# Patient Record
Sex: MALE | Race: WHITE | NOT HISPANIC OR LATINO | URBAN - METROPOLITAN AREA
[De-identification: names, ages, dates, MRNs, and addresses within clinical notes are randomized per-mention and may not be internally consistent; named-entity substitution may affect disease eponyms.]

---

## 2017-04-20 ENCOUNTER — OUTPATIENT (OUTPATIENT)
Dept: OUTPATIENT SERVICES | Facility: HOSPITAL | Age: 57
LOS: 1 days | End: 2017-04-20

## 2017-04-24 ENCOUNTER — OUTPATIENT (OUTPATIENT)
Dept: OUTPATIENT SERVICES | Facility: HOSPITAL | Age: 57
LOS: 1 days | End: 2017-04-24

## 2017-05-01 ENCOUNTER — OUTPATIENT (OUTPATIENT)
Dept: OUTPATIENT SERVICES | Facility: HOSPITAL | Age: 57
LOS: 1 days | End: 2017-05-01

## 2017-05-01 ENCOUNTER — INPATIENT (INPATIENT)
Facility: HOSPITAL | Age: 57
LOS: 1 days | Discharge: HOME | End: 2017-05-03
Payer: COMMERCIAL

## 2017-05-01 PROCEDURE — 72170 X-RAY EXAM OF PELVIS: CPT | Mod: 26

## 2017-05-02 ENCOUNTER — OUTPATIENT (OUTPATIENT)
Dept: OUTPATIENT SERVICES | Facility: HOSPITAL | Age: 57
LOS: 1 days | End: 2017-05-02

## 2017-05-03 ENCOUNTER — OUTPATIENT (OUTPATIENT)
Dept: OUTPATIENT SERVICES | Facility: HOSPITAL | Age: 57
LOS: 1 days | End: 2017-05-03

## 2018-05-14 ENCOUNTER — TRANSCRIPTION ENCOUNTER (OUTPATIENT)
Age: 58
End: 2018-05-14

## 2018-07-25 ENCOUNTER — TRANSCRIPTION ENCOUNTER (OUTPATIENT)
Age: 58
End: 2018-07-25

## 2022-06-05 ENCOUNTER — TRANSCRIPTION ENCOUNTER (OUTPATIENT)
Age: 62
End: 2022-06-05

## 2022-06-05 ENCOUNTER — INPATIENT (INPATIENT)
Facility: HOSPITAL | Age: 62
LOS: 9 days | Discharge: HOME | DRG: 29 | End: 2022-06-15
Attending: STUDENT IN AN ORGANIZED HEALTH CARE EDUCATION/TRAINING PROGRAM | Admitting: INTERNAL MEDICINE
Payer: MEDICARE

## 2022-06-05 VITALS
SYSTOLIC BLOOD PRESSURE: 127 MMHG | WEIGHT: 199.3 LBS | OXYGEN SATURATION: 94 % | HEIGHT: 71 IN | TEMPERATURE: 99 F | HEART RATE: 72 BPM | RESPIRATION RATE: 16 BRPM | DIASTOLIC BLOOD PRESSURE: 78 MMHG

## 2022-06-05 DIAGNOSIS — I10 ESSENTIAL (PRIMARY) HYPERTENSION: ICD-10-CM

## 2022-06-05 DIAGNOSIS — Z86.79 PERSONAL HISTORY OF OTHER DISEASES OF THE CIRCULATORY SYSTEM: ICD-10-CM

## 2022-06-05 DIAGNOSIS — G06.1 INTRASPINAL ABSCESS AND GRANULOMA: ICD-10-CM

## 2022-06-05 DIAGNOSIS — Z01.810 ENCOUNTER FOR PREPROCEDURAL CARDIOVASCULAR EXAMINATION: ICD-10-CM

## 2022-06-05 LAB
ALBUMIN SERPL ELPH-MCNC: 3.3 G/DL — SIGNIFICANT CHANGE UP (ref 3.3–5.2)
ALP SERPL-CCNC: 109 U/L — SIGNIFICANT CHANGE UP (ref 40–120)
ALT FLD-CCNC: 24 U/L — SIGNIFICANT CHANGE UP
ANION GAP SERPL CALC-SCNC: 12 MMOL/L — SIGNIFICANT CHANGE UP (ref 5–17)
AST SERPL-CCNC: 16 U/L — SIGNIFICANT CHANGE UP
BASOPHILS # BLD AUTO: 0 K/UL — SIGNIFICANT CHANGE UP (ref 0–0.2)
BASOPHILS NFR BLD AUTO: 0 % — SIGNIFICANT CHANGE UP (ref 0–2)
BILIRUB SERPL-MCNC: 0.4 MG/DL — SIGNIFICANT CHANGE UP (ref 0.4–2)
BUN SERPL-MCNC: 25.3 MG/DL — HIGH (ref 8–20)
CALCIUM SERPL-MCNC: 8.8 MG/DL — SIGNIFICANT CHANGE UP (ref 8.6–10.2)
CHLORIDE SERPL-SCNC: 93 MMOL/L — LOW (ref 98–107)
CO2 SERPL-SCNC: 23 MMOL/L — SIGNIFICANT CHANGE UP (ref 22–29)
CREAT SERPL-MCNC: 1.16 MG/DL — SIGNIFICANT CHANGE UP (ref 0.5–1.3)
CRP SERPL-MCNC: 23 MG/L — HIGH
EGFR: 71 ML/MIN/1.73M2 — SIGNIFICANT CHANGE UP
EOSINOPHIL # BLD AUTO: 0 K/UL — SIGNIFICANT CHANGE UP (ref 0–0.5)
EOSINOPHIL NFR BLD AUTO: 0 % — SIGNIFICANT CHANGE UP (ref 0–6)
ERYTHROCYTE [SEDIMENTATION RATE] IN BLOOD: 24 MM/HR — HIGH (ref 0–20)
GIANT PLATELETS BLD QL SMEAR: PRESENT — SIGNIFICANT CHANGE UP
GLUCOSE SERPL-MCNC: 270 MG/DL — HIGH (ref 70–99)
HCT VFR BLD CALC: 42.6 % — SIGNIFICANT CHANGE UP (ref 39–50)
HGB BLD-MCNC: 14.3 G/DL — SIGNIFICANT CHANGE UP (ref 13–17)
LYMPHOCYTES # BLD AUTO: 0.94 K/UL — LOW (ref 1–3.3)
LYMPHOCYTES # BLD AUTO: 5.2 % — LOW (ref 13–44)
MANUAL SMEAR VERIFICATION: SIGNIFICANT CHANGE UP
MCHC RBC-ENTMCNC: 30.4 PG — SIGNIFICANT CHANGE UP (ref 27–34)
MCHC RBC-ENTMCNC: 33.6 GM/DL — SIGNIFICANT CHANGE UP (ref 32–36)
MCV RBC AUTO: 90.4 FL — SIGNIFICANT CHANGE UP (ref 80–100)
METAMYELOCYTES # FLD: 0.9 % — HIGH (ref 0–0)
MONOCYTES # BLD AUTO: 0.47 K/UL — SIGNIFICANT CHANGE UP (ref 0–0.9)
MONOCYTES NFR BLD AUTO: 2.6 % — SIGNIFICANT CHANGE UP (ref 2–14)
MYELOCYTES NFR BLD: 3.5 % — HIGH (ref 0–0)
NEUTROPHILS # BLD AUTO: 15.83 K/UL — HIGH (ref 1.8–7.4)
NEUTROPHILS NFR BLD AUTO: 87.8 % — HIGH (ref 43–77)
PLAT MORPH BLD: NORMAL — SIGNIFICANT CHANGE UP
PLATELET # BLD AUTO: 496 K/UL — HIGH (ref 150–400)
POTASSIUM SERPL-MCNC: 4.7 MMOL/L — SIGNIFICANT CHANGE UP (ref 3.5–5.3)
POTASSIUM SERPL-SCNC: 4.7 MMOL/L — SIGNIFICANT CHANGE UP (ref 3.5–5.3)
PROCALCITONIN SERPL-MCNC: 0.12 NG/ML — HIGH (ref 0.02–0.1)
PROT SERPL-MCNC: 6.9 G/DL — SIGNIFICANT CHANGE UP (ref 6.6–8.7)
RBC # BLD: 4.71 M/UL — SIGNIFICANT CHANGE UP (ref 4.2–5.8)
RBC # FLD: 13.3 % — SIGNIFICANT CHANGE UP (ref 10.3–14.5)
RBC BLD AUTO: NORMAL — SIGNIFICANT CHANGE UP
SARS-COV-2 RNA SPEC QL NAA+PROBE: SIGNIFICANT CHANGE UP
SODIUM SERPL-SCNC: 128 MMOL/L — LOW (ref 135–145)
WBC # BLD: 18.03 K/UL — HIGH (ref 3.8–10.5)
WBC # FLD AUTO: 18.03 K/UL — HIGH (ref 3.8–10.5)

## 2022-06-05 PROCEDURE — 99223 1ST HOSP IP/OBS HIGH 75: CPT

## 2022-06-05 PROCEDURE — 72128 CT CHEST SPINE W/O DYE: CPT | Mod: 26

## 2022-06-05 PROCEDURE — 99222 1ST HOSP IP/OBS MODERATE 55: CPT

## 2022-06-05 RX ORDER — VANCOMYCIN HCL 1 G
VIAL (EA) INTRAVENOUS
Refills: 0 | Status: DISCONTINUED | OUTPATIENT
Start: 2022-06-05 | End: 2022-06-05

## 2022-06-05 RX ORDER — DILTIAZEM HCL 120 MG
1 CAPSULE, EXT RELEASE 24 HR ORAL
Qty: 0 | Refills: 0 | DISCHARGE

## 2022-06-05 RX ORDER — CEFAZOLIN SODIUM 1 G
2000 VIAL (EA) INJECTION EVERY 8 HOURS
Refills: 0 | Status: DISCONTINUED | OUTPATIENT
Start: 2022-06-05 | End: 2022-06-06

## 2022-06-05 RX ORDER — METOPROLOL TARTRATE 50 MG
75 TABLET ORAL
Refills: 0 | Status: DISCONTINUED | OUTPATIENT
Start: 2022-06-05 | End: 2022-06-06

## 2022-06-05 RX ORDER — VANCOMYCIN HCL 1 G
1000 VIAL (EA) INTRAVENOUS ONCE
Refills: 0 | Status: COMPLETED | OUTPATIENT
Start: 2022-06-05 | End: 2022-06-05

## 2022-06-05 RX ORDER — LANOLIN ALCOHOL/MO/W.PET/CERES
3 CREAM (GRAM) TOPICAL AT BEDTIME
Refills: 0 | Status: DISCONTINUED | OUTPATIENT
Start: 2022-06-05 | End: 2022-06-06

## 2022-06-05 RX ORDER — SODIUM CHLORIDE 9 MG/ML
1000 INJECTION, SOLUTION INTRAVENOUS
Refills: 0 | Status: DISCONTINUED | OUTPATIENT
Start: 2022-06-05 | End: 2022-06-05

## 2022-06-05 RX ORDER — ALPRAZOLAM 0.25 MG
0.5 TABLET ORAL
Qty: 0 | Refills: 0 | DISCHARGE

## 2022-06-05 RX ORDER — OXYCODONE HYDROCHLORIDE 5 MG/1
5 TABLET ORAL EVERY 4 HOURS
Refills: 0 | Status: DISCONTINUED | OUTPATIENT
Start: 2022-06-05 | End: 2022-06-06

## 2022-06-05 RX ORDER — PANTOPRAZOLE SODIUM 20 MG/1
40 TABLET, DELAYED RELEASE ORAL
Refills: 0 | Status: DISCONTINUED | OUTPATIENT
Start: 2022-06-05 | End: 2022-06-06

## 2022-06-05 RX ORDER — CEFAZOLIN SODIUM 1 G
2000 VIAL (EA) INJECTION ONCE
Refills: 0 | Status: COMPLETED | OUTPATIENT
Start: 2022-06-05 | End: 2022-06-05

## 2022-06-05 RX ORDER — ONDANSETRON 8 MG/1
4 TABLET, FILM COATED ORAL EVERY 8 HOURS
Refills: 0 | Status: DISCONTINUED | OUTPATIENT
Start: 2022-06-05 | End: 2022-06-06

## 2022-06-05 RX ORDER — OMEPRAZOLE 10 MG/1
1 CAPSULE, DELAYED RELEASE ORAL
Qty: 0 | Refills: 0 | DISCHARGE

## 2022-06-05 RX ORDER — ATORVASTATIN CALCIUM 80 MG/1
1 TABLET, FILM COATED ORAL
Qty: 0 | Refills: 0 | DISCHARGE

## 2022-06-05 RX ORDER — METOPROLOL TARTRATE 50 MG
1 TABLET ORAL
Qty: 0 | Refills: 0 | DISCHARGE

## 2022-06-05 RX ORDER — CEFEPIME 1 G/1
2000 INJECTION, POWDER, FOR SOLUTION INTRAMUSCULAR; INTRAVENOUS EVERY 12 HOURS
Refills: 0 | Status: DISCONTINUED | OUTPATIENT
Start: 2022-06-05 | End: 2022-06-05

## 2022-06-05 RX ORDER — ATORVASTATIN CALCIUM 80 MG/1
10 TABLET, FILM COATED ORAL AT BEDTIME
Refills: 0 | Status: DISCONTINUED | OUTPATIENT
Start: 2022-06-05 | End: 2022-06-06

## 2022-06-05 RX ORDER — SENNA PLUS 8.6 MG/1
2 TABLET ORAL AT BEDTIME
Refills: 0 | Status: DISCONTINUED | OUTPATIENT
Start: 2022-06-05 | End: 2022-06-06

## 2022-06-05 RX ORDER — SODIUM CHLORIDE 9 MG/ML
1000 INJECTION INTRAMUSCULAR; INTRAVENOUS; SUBCUTANEOUS
Refills: 0 | Status: DISCONTINUED | OUTPATIENT
Start: 2022-06-05 | End: 2022-06-06

## 2022-06-05 RX ORDER — DILTIAZEM HCL 120 MG
240 CAPSULE, EXT RELEASE 24 HR ORAL DAILY
Refills: 0 | Status: DISCONTINUED | OUTPATIENT
Start: 2022-06-05 | End: 2022-06-06

## 2022-06-05 RX ORDER — POLYETHYLENE GLYCOL 3350 17 G/17G
17 POWDER, FOR SOLUTION ORAL DAILY
Refills: 0 | Status: DISCONTINUED | OUTPATIENT
Start: 2022-06-05 | End: 2022-06-06

## 2022-06-05 RX ORDER — CEFAZOLIN SODIUM 1 G
VIAL (EA) INJECTION
Refills: 0 | Status: DISCONTINUED | OUTPATIENT
Start: 2022-06-05 | End: 2022-06-06

## 2022-06-05 RX ORDER — ENOXAPARIN SODIUM 100 MG/ML
40 INJECTION SUBCUTANEOUS EVERY 24 HOURS
Refills: 0 | Status: DISCONTINUED | OUTPATIENT
Start: 2022-06-05 | End: 2022-06-05

## 2022-06-05 RX ORDER — ACETAMINOPHEN 500 MG
650 TABLET ORAL EVERY 6 HOURS
Refills: 0 | Status: DISCONTINUED | OUTPATIENT
Start: 2022-06-05 | End: 2022-06-06

## 2022-06-05 RX ORDER — OXYCODONE HYDROCHLORIDE 5 MG/1
10 TABLET ORAL EVERY 4 HOURS
Refills: 0 | Status: DISCONTINUED | OUTPATIENT
Start: 2022-06-05 | End: 2022-06-06

## 2022-06-05 RX ORDER — VANCOMYCIN HCL 1 G
1000 VIAL (EA) INTRAVENOUS EVERY 12 HOURS
Refills: 0 | Status: DISCONTINUED | OUTPATIENT
Start: 2022-06-05 | End: 2022-06-05

## 2022-06-05 RX ORDER — LOSARTAN POTASSIUM 100 MG/1
25 TABLET, FILM COATED ORAL DAILY
Refills: 0 | Status: DISCONTINUED | OUTPATIENT
Start: 2022-06-05 | End: 2022-06-06

## 2022-06-05 RX ADMIN — CEFEPIME 100 MILLIGRAM(S): 1 INJECTION, POWDER, FOR SOLUTION INTRAMUSCULAR; INTRAVENOUS at 05:51

## 2022-06-05 RX ADMIN — Medication 250 MILLIGRAM(S): at 03:38

## 2022-06-05 RX ADMIN — Medication 240 MILLIGRAM(S): at 05:52

## 2022-06-05 RX ADMIN — SODIUM CHLORIDE 100 MILLILITER(S): 9 INJECTION INTRAMUSCULAR; INTRAVENOUS; SUBCUTANEOUS at 12:48

## 2022-06-05 RX ADMIN — Medication 100 MILLIGRAM(S): at 12:46

## 2022-06-05 RX ADMIN — LOSARTAN POTASSIUM 25 MILLIGRAM(S): 100 TABLET, FILM COATED ORAL at 05:52

## 2022-06-05 RX ADMIN — SODIUM CHLORIDE 100 MILLILITER(S): 9 INJECTION INTRAMUSCULAR; INTRAVENOUS; SUBCUTANEOUS at 21:06

## 2022-06-05 RX ADMIN — Medication 75 MILLIGRAM(S): at 18:26

## 2022-06-05 RX ADMIN — PANTOPRAZOLE SODIUM 40 MILLIGRAM(S): 20 TABLET, DELAYED RELEASE ORAL at 05:51

## 2022-06-05 RX ADMIN — SODIUM CHLORIDE 125 MILLILITER(S): 9 INJECTION, SOLUTION INTRAVENOUS at 03:54

## 2022-06-05 RX ADMIN — Medication 100 MILLIGRAM(S): at 21:05

## 2022-06-05 RX ADMIN — Medication 75 MILLIGRAM(S): at 05:52

## 2022-06-05 RX ADMIN — ATORVASTATIN CALCIUM 10 MILLIGRAM(S): 80 TABLET, FILM COATED ORAL at 21:06

## 2022-06-05 NOTE — H&P ADULT - ASSESSMENT
ASSESSMENT:  61M with PMHX HTN, DVT/PE, Testicular CA s/p chemo/radiation/LN Dissection c/o SOB/RICKETTS and cough admitted to [facility] for Acute Hypoxic Respiratory Failure 2/2 COVID PNA with hospital course c/b multiple electrolyte abnormalities and MSSA Bacteremia subsequently found to have Diskitis/Osteomyelitis and Epidural Abscess for which he was transferred to [facility] for IR and Neurosurgical evaluation/intervention.    PLAN:  Diskitis/Osteomyelitis/Epidural Abscess r/o Cord Compression/Cauda Equina  -Admit to Medicine/SDU  -Neurochecks q2  -Vanco 1g IV q12  -Cefepime 2g IV q12  -Repeat BCX x2  -Check Labs  -CRP/ESR/Procal  -MRI reviewed  -IVF LR 125cc/hr  -VTE PPX SCD for now in case of intervention  -NPO  -IR Consulted Case discussed with Dr Ta from IR who reviewed images - no role for IR intervention at this time recommends neurosurgical evaluation of diskitis/OM/epidural abscess   -ID Consulted  -Neurosurgery Consulted - Dr Vicente aware    Acute Hypoxic Resp Failure 2/2 COVID PNA  -Vacccinated x2  -CTA Chest negative for PE  -Completed Remdesivir at [facility]  -Discontinue further Dexamethasone 6mg IV q24 in setting of MSSA Bacteremia, Epidural Abscess, and Diskitis/Osteomyelitis unless neuro exam becomes compromised on repeat neurocheck  -Hypoxia improving satting 94% RA    MSSA Bacteremia  -Repeat Cultures at [facility] negative. Ancef changed to Cefepime. TTE negative for veg at [facility]. Cont Cefepime for now. Add Vanco. Repeat BCX. ID consult. See plan above.     Hypokalemia, Hyponatremia  -Resolved then c/b Hyperkalemia. IVF LR 125cc/hr.   -Hold HCTZ. Losartan 25mg PO q24 added at [facility]  -Repeat BMP/Check Electrolytes    HTN, HLD  -Cardizem 240mg PO q24  -Metoprolol Tartrate 75mg PO BID  -Hold HCTZ  -Losartan 25mg PO q24  -Check BP    Testicular CA s/p chemo/radiation/LN Dissection  -PETSCAN 6-7 mos ago negative for malignancy  -CT ABD findings likely known Lymphocele / postsurgical changes from prior LN dissection  -Outpatient F/u [facility]     Constipation  -Miralax 17g PO q24 + Senna 2 tab qHS while on Oxycodone PRN

## 2022-06-05 NOTE — CONSULT NOTE ADULT - PROBLEM SELECTOR RECOMMENDATION 2
.  -= remote history 20+ years ago after taking afrin and primatine mist   - DCCV  after second occurance 20+ years ago  - not on AC  - SR tele, no alarms

## 2022-06-05 NOTE — CONSULT NOTE ADULT - PROBLEM SELECTOR RECOMMENDATION 9
.  - Pre-operative cardiovascular risk evaluation and management.   -No cardiac symptoms.   -Non-ischemic ECG.   -TTE normal EF, no RWMA  -Ischemic w/u- as per pt nst last year-normal.   -METs > 4.   - RCRI (revised cardiac risk index score) < 1%. No further cardiac work up is needed.   Further cardiac work up will not change risk of the patient. Proceed for surgery as indicated.  - No active chest pain, evidence of ischemia, decompensated CHF, significant valvular abnormality, or unstable arrhythmia and therefore no absolute cardiac contraindication to the planned surgical procedure.

## 2022-06-05 NOTE — CONSULT NOTE ADULT - SUBJECTIVE AND OBJECTIVE BOX
HPI:  61M with PMHX HTN, DVT/PE, Testicular CA s/p chemo/radiation/LN Dissection c/o SOB/RICKETTS and cough admitted to [facility] for Acute Hypoxic Respiratory Failure 2/2 COVID PNA. Treated with steroids and remdesivir. Found to have MSSA bacteremia treated with Ancef. Subsequently c/o low back pain had MRI WO which noted diskitis/osteomyelitis and epidural abscess. Patient evaluated by Neurosurgery at [place] who recommended IR intervention and transfer to [facility]. Neurosurgery consulted. Pt seen/examined. Denies current complaints. Doing well. ROS negative. Neuro exam intact. Discussed with RN.     61M transfer from [place] where he presented with SOB and cough, found to have covid - treated w Remdesivir/steroids, blood cultures + MSSA, on Ancef, c/o back pain - imaging shows T6-10 phlegmon and epidural abcess T7-8.  Pt denies radiating pain, tingling numbness and weakness, bowel bladder changes.  Has been walking without difficulty.  Admits to peripheral neuropathy that has been present since finishing chemo therapy 5 yrs ago - tingling in both hands and loss of sensation both feet.      PMHX: HTN, DVT/PE, Testicular CA s/p chemo/radiation/LN Dissection  PSHX: Radical Orchiectomy, Hip Replacement, Hernia Repair  FamHx: +Mother CA +Mother DM2, +Father CAD  Social Hx: Former Smoker quit 20 years ago  NKDA (05 Jun 2022 02:52)      SOCIAL HISTORY:  Tobacco Use: quit 20yrs ago  EtOH use:   Substance:    REVIEW OF SYSTEMS  RESPIRATORY: HPI   CARDIOVASCULAR: No chest pain, palpitations  GASTROINTESTINAL: No abdominal or epigastric pain bowel changes  GENITOURINARY: No dysuria  NEUROLOGICAL: Baseline neuropathy hands and feet    HOME MEDICATIONS:  Home Medications:  ALPRAZolam 0.25 mg oral tablet: 0.5 tab(s) orally once a day, As Needed (05 Jun 2022 02:21)  atorvastatin 10 mg oral tablet: 1 tab(s) orally once a day (05 Jun 2022 02:20)  Cartia  mg/24 hours oral capsule, extended release: 1 cap(s) orally once a day (05 Jun 2022 02:20)  hydroCHLOROthiazide 12.5 mg oral capsule: 1 cap(s) orally once a day (05 Jun 2022 02:20)  Metoprolol Tartrate 75 mg oral tablet: 1 tab(s) orally 2 times a day (05 Jun 2022 02:20)  omeprazole 40 mg oral delayed release capsule: 1 cap(s) orally once a day (05 Jun 2022 02:20)  Percocet 5/325 oral tablet: 1 tab(s) orally every 6 hours, As Needed (05 Jun 2022 02:20)      MEDICATIONS:  Antibiotics:  cefepime   IVPB 2000 milliGRAM(s) IV Intermittent every 12 hours  vancomycin  IVPB 1000 milliGRAM(s) IV Intermittent once  vancomycin  IVPB 1000 milliGRAM(s) IV Intermittent every 12 hours  vancomycin  IVPB        Neuro:  acetaminophen     Tablet .. 650 milliGRAM(s) Oral every 6 hours PRN  melatonin 3 milliGRAM(s) Oral at bedtime PRN  ondansetron Injectable 4 milliGRAM(s) IV Push every 8 hours PRN  oxyCODONE    IR 5 milliGRAM(s) Oral every 4 hours PRN  oxyCODONE    IR 10 milliGRAM(s) Oral every 4 hours PRN    Anticoagulation:    OTHER:  aluminum hydroxide/magnesium hydroxide/simethicone Suspension 30 milliLiter(s) Oral every 4 hours PRN  atorvastatin 10 milliGRAM(s) Oral at bedtime  diltiazem    milliGRAM(s) Oral daily  losartan 25 milliGRAM(s) Oral daily  metoprolol tartrate 75 milliGRAM(s) Oral two times a day  pantoprazole    Tablet 40 milliGRAM(s) Oral before breakfast  polyethylene glycol 3350 17 Gram(s) Oral daily  senna 2 Tablet(s) Oral at bedtime    IVF:  lactated ringers. 1000 milliLiter(s) IV Continuous <Continuous>    LABS:  CRP 23  ESR 24  D-Dimers 490                          14.3   18.03 )-----------( 496      ( 05 Jun 2022 02:05 )             42.6     06-05    128<L>  |  93<L>  |  25.3<H>  ----------------------------<  270<H>  4.7   |  23.0  |  1.16    Ca    8.8      05 Jun 2022 02:05    TPro  6.9  /  Alb  3.3  /  TBili  0.4  /  DBili  x   /  AST  16  /  ALT  24  /  AlkPhos  109  06-05    CULTURES:  Blood Culture 5/29 + MSSA      RADIOLOGY & ADDITIONAL STUDIES:  MRI T/spine - phlegmon T6-10, abcess T7-8  MRI L/spine - cyst anterior to L34 vertebral bodies 3x4x5 cm    Vital Signs Last 24 Hrs  T(C): 37.1 (05 Jun 2022 00:35), Max: 37.1 (05 Jun 2022 00:35)  T(F): 98.8 (05 Jun 2022 00:35), Max: 98.8 (05 Jun 2022 00:35)  HR: 72 (05 Jun 2022 00:35) (72 - 72)  BP: 127/78 (05 Jun 2022 00:35) (127/78 - 127/78)  BP(mean): 100 (05 Jun 2022 00:35) (100 - 100)  RR: 16 (05 Jun 2022 00:35) (16 - 16)  SpO2: 94% (05 Jun 2022 00:35) (94% - 94%)      PHYSICAL EXAM:  GENERAL: NAD, well-groomed  SILT nipples to toes b/l  M 5/5 IP Quad TA EHL Gastroc & Peroneals b/l  SILT L3-S1  DTR 2+ knees absent AJ  Babinski non reactive b/l      
                                           [facility]                                                INFECTIOUS DISEASES  =======================================================                               Chance Boggs MD#  Luis Manuel Anand MD*                                     Brielle De La Rosa MD*    Dania Villarreal MD*            Diplomates American Board of Internal Medicine & Infectious Diseases                  # [place] Office - Appt - Tel  [phone] Fax [phone]                * [place] Office - Appt - Tel [phone] Fax [phone]                                  Hospital Consult line:  [phone]  =======================================================      MRN-[id number]  DIANA GUTIERREZ   HPI: This 61M with HTN, DVT/PE, Testicular CA s/p chemo/radiation/LN Dissection c/o SOB/RICKETTS and cough admitted to [facility] for Acute Hypoxic Respiratory Failure 2/2 COVID PNA. Treated with steroids and remdesivir. Found to have MSSA bacteremia treated with Ancef. Subsequently c/o low back pain had MRI WO which noted diskitis/osteomyelitis and epidural abscess. Patient evaluated by Neurosurgery at [place] who recommended IR intervention and transfer to [facility]. Neurosurgery consulted. Pt seen/examined. Denies current complaints. Doing well. ROS negative. Neuro exam intact. Discussed with RN.     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 he went to see a spine Dr. In [place] Who dx him with arthritis of the back and treated with pain meds.   about 2 weeks ago, he developed a boil in his RIGHT groin. He denies squeezing at it, but stated that it went away as quick as it came.   Denies IVDU.   no other skin conditions to report.     MRI and CT scan from [facility] reviewed.     I have personally reviewed the labs and data; pertinent labs and data are listed in this note; please see below.   =======================================================  Past Medical & Surgical Hx:  =====================  PAST MEDICAL & SURGICAL HISTORY:    Problem List:  ==========  HEALTH ISSUES - PROBLEM Dx:  Preop cardiovascular exam  History of atrial fibrillation  HTN (hypertension)      Social Hx:  =======  no toxic habits currently    FAMILY HISTORY:  no significant family history of immunosuppressive disorders in mother or father   =======================================================    REVIEW OF SYSTEMS:  CONSTITUTIONAL:  No Fever or chills  HEENT:  No diplopia or blurred vision.  No earache, sore throat or runny nose.  CARDIOVASCULAR:  No pressure, squeezing, strangling, tightness, heaviness or aching about the chest, neck, axilla or epigastrium.  RESPIRATORY:  No cough, shortness of breath  GASTROINTESTINAL:  No nausea, vomiting or diarrhea.  GENITOURINARY:  No dysuria, frequency or urgency. No Blood in urine  MUSCULOSKELETAL:  BACK PAIN  SKIN:  No change in skin, hair or nails.  NEUROLOGIC:  No Headaches, seizures or weakness.  PSYCHIATRIC:  No disorder of thought or mood.  ENDOCRINE:  No heat or cold intolerance  HEMATOLOGICAL:  No easy bruising or bleeding.    =======================================================  Allergies  No Known Allergies    Antibiotics:  ceFAZolin   IVPB 2000 milliGRAM(s) IV Intermittent every 8 hours    Other medications:  atorvastatin 10 milliGRAM(s) Oral at bedtime  diltiazem    milliGRAM(s) Oral daily  losartan 25 milliGRAM(s) Oral daily  metoprolol tartrate 75 milliGRAM(s) Oral two times a day  pantoprazole    Tablet 40 milliGRAM(s) Oral before breakfast  polyethylene glycol 3350 17 Gram(s) Oral daily  senna 2 Tablet(s) Oral at bedtime  sodium chloride 0.9%. 1000 milliLiter(s) IV Continuous <Continuous>     ceFAZolin   IVPB   100 mL/Hr IV Intermittent (22 @ 12:46)    cefepime   IVPB   100 mL/Hr IV Intermittent (22 @ 05:51)    vancomycin  IVPB   250 mL/Hr IV Intermittent (22 @ 03:38)      ======================================================  Physical Exam:  ============  T(F): 98 (2022 12:00), Max: 98.8 (2022 00:35)  HR: 71 (2022 12:00)  BP: 128/89 (2022 12:00)  RR: 16 (2022 12:00)  SpO2: 100% (2022 12:00) (94% - 100%)  temp max in last 48H T(F): , Max: 98.8 (22 @ 00:35)Height (cm): 180.3 (22 @ 00:35)  Weight (kg): 90.4 (22 @ 00:35)  BMI (kg/m2): 27.8 (22 @ 00:35)  BSA (m2): 2.11 (22 @ 00:35)    General:  No acute distress.  Eye: Pupils are equal, round and reactive to light, Extraocular movements are intact, Normal conjunctiva.  HENT: Normocephalic, Oral mucosa is moist, No pharyngeal erythema, No sinus tenderness.  Neck: Supple, No lymphadenopathy.  Respiratory: Lungs are clear to auscultation, Respirations are non-labored.  Cardiovascular: Normal rate, Regular rhythm,   Gastrointestinal: Soft, Non-tender, Non-distended, Normal bowel sounds.  Genitourinary: No costovertebral angle tenderness.  Lymphatics: No lymphadenopathy neck,   Musculoskeletal: MID thoracic spine with tenderness to palpation  Integumentary: No rash.  Neurologic: Alert, Oriented, No focal deficits, Cranial Nerves II-XII are grossly intact.  Psychiatric: Appropriate mood & affect.    =======================================================  Labs:                        14.3   18.03 )-----------( 496      ( 2022 02:05 )             42.6     -    128<L>  |  93<L>  |  25.3<H>  ----------------------------<  270<H>  4.7   |  23.0  |  1.16    Ca    8.8      2022 02:05    TPro  6.9  /  Alb  3.3  /  TBili  0.4  /  DBili  x   /  AST  16  /  ALT  24  /  AlkPhos  109  -      Creatinine, Serum: 1.16 mg/dL (22 @ 02:05)    C-Reactive Protein, Serum: 23 mg/L (22 @ 02:05)    Sedimentation Rate, Erythrocyte: 24 mm/hr (22 @ 02:05)    Procalcitonin, Serum: 0.12 ng/mL (22 @ 02:05)    ====================       AT [place]  COVID+ on 22  COVID negative on 22  ==========    ACC: [id number] EXAM:  CT THORACIC SPINE                          PROCEDURE DATE:  2022          INTERPRETATION:  Clinical indication: MSSA bacteremia with thoracic   epidural abscess, preop    Serial thin sections on a multi slice scanner were obtained through the   thoracic spine from the T1-2 to the L2-3 level in a stacked axial fashion   reformatted at 1.25 mm sections with sagittal and coronal computer   generated reconstructed views.      Comparison is made with the prior MRI of the thoracic and lumbar spine of   2022.    The thoracic vertebral bodies are normal in height and density. There is   irregularity at the T7-8 disc space which is narrow with mild erosion of   the endplates of T7 and T8 at the T7-8 level, at the level of the   enhancing vertebral bodies and the ventral epidural extension seen on   MRI. Mild erosion of the endplates of T8 and T9 are also identified. A   small amount of anterior and bilateral paraspinal soft tissue is   identified at T7-8 and T8-9 consistent with inflammatory changes.    Vacuum disc phenomenon is identified at T10-11. At L1-2 mild   retrolisthesis of L1 on L2 with disc space narrowing and vacuum   phenomenon. Endplate sclerosis is identified as well as several areas of   endplate erosion. On the MRI of 2022 there is also enhancement of the   disc space at the endplates with mild ventral epidural enhancement at L1.   A small amount of anterior paraspinal soft tissue prominence is   identified at this level as well as surgical clips.    The The circumferential enhancing epidural collection in the thoracic   spine seen on the prior MRI is not appreciated on this noncontrast study.      IMPRESSION: Endplate erosion T7-8, T8-9 and L1-2 consistent with   osteomyelitis and discitis as on the MRI of the thoracic and lumbar spine   of 2022. Mild anterior and paraspinal soft tissue prominence at these   levels. The ventral epidural extension and the circumferential epidural   collection seen on the MRI is not appreciated.    --- End of Report ---       GALA PEREZ MD; Attending Radiologist  This document has been electronically signed. 2022  2:23PM    ================      ***Final Report***      PROCEDURE:   MRI 1008 _ MRI T SPINE W/O,W/ [id number]  DATE:  2022  ORDER NO:  [id number]    MR LUMBAR SPINE WITHOUT AND WITH IV CONTRAST, MR THORACIC SPINE WITHOUT AND  WITH IV CONTRAST    HISTORY: pain, MSSA bacteremia. Mid back pain.    TECHNIQUE:  Multiplanar MRI of the thoracic and lumbar spine was performed  with and without contrast using 6 sequences.    COMPARISON:  CT abdomen and pelvis 2022, chest CT 2022.    FINDINGS:    OSSEOUS STRUCTURES    Thoracic:  No evidence of acute fracture.    The alignment of the thoracic spine is within normal limits.    There is edema and enhancement of T7 and T8 with adjacent left-sided  paraspinal soft tissue swelling and enhancement. Increased T2 signal is seen  within the T7-T8 disc space and findings are compatible with  discitis/osteomyelitis. Circumferential enhancement is seen within the  spinal canal extending from the T6 level through the T9-T10 interspace  compatible with epidural phlegmon. Interspersed nonenhancing collections are  seen throughout this area compatible with an associated multiloculated  epidural abscess seen most notably at the T7-T8 interspace on the left, the  T8 level on the right and the T9 level posteriorly. There is constriction of  the thecal sac and moderate spinal cord compression most prominent at the  T7-T9 levels.    Small disc bulges noted at the level of C7-T1 through T2-T3. No significant  neural foraminal narrowing. No significant facet arthrosis.    Lumbar:    There is retrolisthesis of L1 on L2, retrolisthesis of L3 on L4.    There is disc space narrowing at the level of L1-L2 with subadjacent  increased fluid signal and enhancement of the vertebral bodies. There is  notable focal enhancing lesion within the anterior disc space measuring  approximately 12 x 8 mm demonstrating intermediate T2 signal. While this may  be degenerative, findings are concerning for discitis with subadjacent  phlegmon. Soft tissue thickening and enhancement is seen behind the L1  vertebral body which may represent prominent venous plexus or early phlegmon.    Disc space narrowing at the level of L2-L3. Schmorl's nodes at the superior  and inferior aspect of L3.    The spinal cord terminates at the level of L1-L2.    L1-L2: Mild disc bulge. Focal lesion as described above. No central canal  stenosis. No neural foraminal narrowing. No facet arthropathy.    L2-L3: There is no disc bulge. No central canal stenosis. No neural  foraminal narrowing. No facet arthropathy.    L3-L4: Mild disc bulge. No central canal stenosis. No neural foraminal  narrowing. No facet arthropathy.    L4-L5: Mild disc bulge with mild thickening of the ligamentum flavum. Mild  central canal stenosis. Mild bilateral neural foraminal narrowing. Mild  bilateral facet arthropathy.    L5-S1: Mild disc bulge with mild thickening of the ligamentum flavum. No  central canal stenosis. No neural foraminal narrowing. No facet arthropathy.      SOFT TISSUES  There is a 4.1 x 3.3 x 5.3 cm cyst anterior to the lumbar spine at the level  of L3-L4 to the left of the aorta, unchanged from prior. Multiple bilateral  renal cysts.    IMPRESSION:  T7-8 discitis/osteomyelitis with phlegmon/abscess extending from the T6-T9 levels causing moderate central canal stenosis and moderate cord compression.    Findings concerning for L1-L2 discitis/osteomyelitis with associated  phlegmon as discussed above.    4.1 x 3.3 x 5.3 cm periaortic cystic mass. See discussion in prior abdomen  CT report.    These findings were discussed with, and acknowledged by, Dr. Yusra George at  2022 6:41 PM  by Dr. Jennifer Pena.    --- End of Report ---           Interpreting Physician:    Radiologist PARRIS HUYNH, Resident JENNIFER PENA  Signed Electronically by / Date: PARRIS HUYNH2022  6:45PM          ===============  [facility]                Patient:  DIANA GUTIERREZ    [address] [place] [address]                           MRN:[id number]    [place] [address]                        :1960   Sex: Male    Director:    Minor Farrell M.D.               FIN:[id number]  Ordering Physician:  Betty Sutherland             Location:  2SW; 0260; 01      Blood Cultures      PROCEDURE:                Blood Culture  [i1]      COLLECTED:                2022 06:30 EDT  SOURCE:                   Blood                    RECEIVED:                 2022 11:33 EDT  BODY SITE:                Arm R                    ACCESSION:                3--0016  FREE TEXT SOURCE:         [id number] ARM           ORDERING PHYSICIAN:       Betty Sutherland    ***FINAL REPORT***  Final Report  []                                              Reported Date/Time: 2022 09:14 EDT  Staphylococcus aureus ( From Aerobic    ***PRELIMINARY REPORT***  Preliminary Report  []                                        Reported Date/Time: 2022 12:27 EDT  Presumptive Staphylococcus aureus (From Anaerobic Bottle) , identification and susceptibility to  follow.    Preliminary Report  []                                        Reported Date/Time: 2022 03:43 EDT  Culture in progress    ***STAINS / PREPARATIONS***  GS  []                                                        Reported Date/Time: 2022 09:08 EDT  Gram positive cocci in clusters (From Anaerobic Bottle)    Results called to Suellen Londono RN of 2SW. 2022 03:45:18, by AN. read back  Gram positive cocci in clusters (From Aerobic Bottle)  Result Previously Reported.  2022 09:08:44    ***SUSCEPTIBILITY RESULTS***  Staphylococcus aureus  Antibiotic                    JOVANI Dilutn              JOVANI Interp  Ciprofloxacin                 <=0.5                   Susceptible  Clindamycin                   0.25                    Resistant  Daptomycin                    0.5                     Susceptible  Erythromycin                  1                       Resistant  Gentamicin                    <=0.5                   Susceptible  Inducible Clindamycin Test    Positive                Positive  Levofloxacin                  <=0.12                  Susceptible  Linezolid                     2                       Susceptible  Oxacillin                     0.5                     Susceptible  Tetracycline                  <=1                     Susceptible  Trimethoprim/Sulfa            <=10                    Susceptible  Vancomycin                    1                       Susceptible    Interpretive Data  i1:   Blood Culture  Test performed at: [facility]  [address], [place] [address]         [phone]  
                                             [facility]                                              CARDIOLOGY AT [facility]                                                   [address], [place] [address]                                             Telephone: [phone]. Fax:[phone]                                                       CARDIOLOGY CONSULTATION NOTE                                                                                             History obtained by: Patient and medical record  Community Cardiologist: Can not recall- cory erwin  Reason for Consultation: Pre op  Available out pt records reviewed: Yes [  ] No [x  ]    Chief complaint:    Patient is a 61y old  Male who presents with a chief complaint of Epidural Abscess (05 Jun 2022 03:36)      HPI:  61M with PMHX HTN, DVT/PE, Afib 20-26y/o DCCV, Testicular CA s/p chemo/radiation/LN Dissection c/o SOB/RICKETTS and cough admitted to [facility] for Acute Hypoxic Respiratory Failure 2/2 COVID PNA. Treated with steroids and remdesivir. Found to have MSSA bacteremia treated with Ancef. Subsequently c/o low back pain had MRI WO which noted diskitis/osteomyelitis and epidural abscess. Patient evaluated by Neurosurgery at [place] who recommended IR intervention and transfer to [facility]. Neurosurgery consulted. Cardiology consulted for evaluation pre op. Denies chest pain shortness of breat dyspnea with exertion. PT states stress test last year, "clean". ECHO from [place] in HIE, maggie EF. EKG without ischemia.       CARDIAC TESTING   ECHO: 5/31/22- EF 50-55%, mild AS, no RWMA    STRESS: States last year- cardiologist office, normal       PAST MEDICAL HISTORY  HTN, DVT/PE, Testicular CA s/p chemo/radiation/LN Dissection      PAST SURGICAL HISTORY   Radical Orchiectomy, Hip Replacement, Hernia Repair        SOCIAL HISTORY:  Denies smokingdrugs  ALCOHOL: 3-4 bottles wine/week          FAMILY HISTORY:  +Mother CA +Mother DM2, +Father CAD      HOME MEDICATIONS:  ALPRAZolam 0.25 mg oral tablet: 0.5 tab(s) orally once a day, As Needed (05 Jun 2022 02:21)  atorvastatin 10 mg oral tablet: 1 tab(s) orally once a day (05 Jun 2022 02:20)  Cartia  mg/24 hours oral capsule, extended release: 1 cap(s) orally once a day (05 Jun 2022 02:20)  hydroCHLOROthiazide 12.5 mg oral capsule: 1 cap(s) orally once a day (05 Jun 2022 02:20)  Metoprolol Tartrate 75 mg oral tablet: 1 tab(s) orally 2 times a day (05 Jun 2022 02:20)  omeprazole 40 mg oral delayed release capsule: 1 cap(s) orally once a day (05 Jun 2022 02:20)  Percocet 5/325 oral tablet: 1 tab(s) orally every 6 hours, As Needed (05 Jun 2022 02:20)      CURRENT CARDIAC MEDICATIONS:  diltiazem  milliGRAM(s) Oral daily  losartan 25 milliGRAM(s) Oral daily  metoprolol tartrate 75 milliGRAM(s) Oral two times a day      CURRENT OTHER MEDICATIONS:  acetaminophen     Tablet .. 650 milliGRAM(s) Oral every 6 hours PRN Temp greater or equal to 38C (100.4F), Mild Pain (1 - 3)  melatonin 3 milliGRAM(s) Oral at bedtime PRN Insomnia  ondansetron Injectable 4 milliGRAM(s) IV Push every 8 hours PRN Nausea and/or Vomiting  oxyCODONE    IR 5 milliGRAM(s) Oral every 4 hours PRN Moderate Pain (4 - 6)  oxyCODONE    IR 10 milliGRAM(s) Oral every 4 hours PRN Severe Pain (7 - 10)  aluminum hydroxide/magnesium hydroxide/simethicone Suspension 30 milliLiter(s) Oral every 4 hours PRN Dyspepsia  pantoprazole    Tablet 40 milliGRAM(s) Oral before breakfast  polyethylene glycol 3350 17 Gram(s) Oral daily  senna 2 Tablet(s) Oral at bedtime  atorvastatin 10 milliGRAM(s) Oral at bedtime  ceFAZolin   IVPB      ceFAZolin   IVPB 2000 milliGRAM(s) IV Intermittent every 8 hours  lactated ringers. 1000 milliLiter(s) (125 mL/Hr) IV Continuous <Continuous>  sodium chloride 0.9%. 1000 milliLiter(s) (100 mL/Hr) IV Continuous <Continuous>      ALLERGIES:   No Known Allergies      REVIEW OF SYMPTOMS:   CONSTITUTIONAL: No fever, no chills, no weight loss, no weight gain, no fatigue   ENMT:  No vertigo; No sinus or throat pain  NECK: No pain or stiffness  CARDIOVASCULAR: No chest pain, no dyspnea, no syncope/presyncope, no palpitations, no dizziness, no Orthopnea, no Paroxsymal nocturnal dyspnea  RESPIRATORY: no Shortness of breath, no cough, no wheezing  : No dysuria, no hematuria   GI: No dark color stool, no nausea, no diarrhea, no constipation, no abdominal pain   NEURO: No headache, no slurred speech   MUSCULOSKELETAL: No joint pain or swelling; No muscle, back, or extremity pain  PSYCH: No agitation, no anxiety.    ALL OTHER REVIEW OF SYSTEMS ARE NEGATIVE.    VITAL SIGNS:  T(C): 36.7 (06-05-22 @ 12:00), Max: 37.1 (06-05-22 @ 00:35)  T(F): 98 (06-05-22 @ 12:00), Max: 98.8 (06-05-22 @ 00:35)  HR: 71 (06-05-22 @ 12:00) (58 - 72)  BP: 128/89 (06-05-22 @ 12:00) (114/82 - 128/89)  RR: 16 (06-05-22 @ 12:00) (16 - 17)  SpO2: 100% (06-05-22 @ 12:00) (94% - 100%)    INTAKE AND OUTPUT:     06-04 @ 07:01  -  06-05 @ 07:00  --------------------------------------------------------  IN: 675 mL / OUT: 1500 mL / NET: -825 mL        PHYSICAL EXAM:  Constitutional: Comfortable . No acute distress.   HEENT: Atraumatic and normocephalic , neck is supple . no JVD. No carotid bruit.  CNS: A&Ox3. No focal deficits.   Respiratory: CTAB, unlabored   Cardiovascular: RRR normal s1 s2. No murmur. No rubs or gallop.  Gastrointestinal: Soft, non-tender. +Bowel sounds.   Extremities: 2+ Peripheral Pulses, No clubbing, cyanosis, or edema  Psychiatric: Calm . no agitation.   Skin: Warm and dry, no ulcers on extremities     LABS:                            14.3   18.03 )-----------( 496      ( 05 Jun 2022 02:05 )             42.6     06-05    128<L>  |  93<L>  |  25.3<H>  ----------------------------<  270<H>  4.7   |  23.0  |  1.16    Ca    8.8      05 Jun 2022 02:05    TPro  6.9  /  Alb  3.3  /  TBili  0.4  /  DBili  x   /  AST  16  /  ALT  24  /  AlkPhos  109  06-05                INTERPRETATION OF TELEMETRY:     ECG:   Prior ECG: Yes [  ] No [  ]    RADIOLOGY & ADDITIONAL STUDIES:    X-ray:    CT scan:   MRI:   US:

## 2022-06-05 NOTE — CONSULT NOTE ADULT - NS ATTEND AMEND GEN_ALL_CORE FT
NSGY Attg:    see above    patient seen and examined    LE 5/5    imaging reviewed    I explained the risks, benefits, and alternatives of thoracic laminectomy to the patient as below:    benefit: hopeful decompression, hopeful improvement in symptoms, hopeful prevention of progression of infection/deficit   alternative: no surgical intervention; continued conservative therapy (antibiotics, brace, surveillance imaging, pain management)  risks: bleeding, infection, CSF leak, failure of procedure, de-stabilization of the spine, recurrence of infection, need for re-operation/additional surgery, worsening pain, seizure, stroke, coma, death, DVT, PE, MI, PNA, UTI, difficulty/failure to intubate or extubate, new or worsening numbness, tingling, weakness, paralysis, sensory changes, difficulty/inability to ambulate, sexual dysfunction, incontinence    The patient verbalizes his understanding of the above.  I have answered all his questions.  He wishes to proceed with surgical intervention.
Epidural abcess. unclear source. recent UTi. recent Furuncle.  No dental work up.   check blood cultures. patietn optimized for epidural surgeryu  echo as per note in chuy was unremarkable  proceed for procedure as indicazgted. no further cardaic work up si needed.

## 2022-06-05 NOTE — CONSULT NOTE ADULT - ASSESSMENT
This 61M with HTN, DVT/PE, Testicular CA s/p chemo/radiation/LN Dissection c/o SOB/RICKETTS and cough admitted to [facility] for Acute Hypoxic Respiratory Failure 2/2 COVID PNA. Treated with steroids and remdesivir. Found to have MSSA bacteremia treated with Ancef. Subsequently c/o low back pain had MRI WO which noted diskitis/osteomyelitis and epidural abscess. Patient evaluated by Neurosurgery at [place] who recommended IR intervention and transfer to [facility]. Neurosurgery consulted. Pt seen/examined. Denies current complaints. Doing well. ROS negative. Neuro exam intact. Discussed with RN.     PMHX: HTN, DVT/PE, Testicular CA s/p chemo/radiation/LN Dissection  PSHX: Radical Orchiectomy, Hip Replacement, Hernia Repair  FamHx: +Mother CA +Mother DM2, +Father CAD  Social Hx: Former Smoker quit 20 years ago  NKDA (05 Jun 2022 02:52)    patient has been having back pain; recalls episode of severe back pain in a 4D movies experience of [place] in 10/2021. he went to see a spine Dr. In [place] Who dx him with arthritis of the back and treated with pain meds.   about 2 weeks ago, he developed a boil in his RIGHT groin. He denies squeezing at it, but stated that it went away as quick as it came.     Denies IVDU.   no other skin conditions to report.     MRI and CT scan from [facility] reviewed.   MSSA identified in blood cultures on 5/29/22 x 2 sets  but cleared on 5/31/22 x 2 sets, and 6/1/22 x 2 sets.       Impression:  WBC elevation  MSSA bacteremia  Spine Osteomyelitis  thoracic spine abscess  back pan      Plan:  - neurosurgical team consulted  - patient would benefit from abscess drainage and /or debridement.   Please send surgical cultures    - continue Cefazolin 2 grams Q 8H    WBC elevation is reactive  - will follow and trend    - follow up all outstanding cultures  - trend temperature and WBC curve  - repeat cultures from blood and all sources if febrile.    control of back pain per primary team      No contraindications from ID perspective for neurosurgical intervention.   
Imp Epidural abcess T7-8    Plan pre-op for laminectomy, medical risk assessment & clearance for surgery, ekg, echo, cxr, type & screen, covid tests, 2 units on hold for OR, bed rest, SCD's for dvt ppx    pain control  CT T/spine  MRI T2 sequence, axial and sagittal with continuous cuts from T4 pedicle to Sacrum  likely advance diet today & NPO past midnight tonight  continue abx
61M with PMHX HTN, DVT/PE, Afib 20-24y/o DCCV, Testicular CA s/p chemo/radiation/LN Dissection c/o SOB/RICKETTS and cough admitted to [facility] for Acute Hypoxic Respiratory Failure 2/2 COVID PNA. Treated with steroids and remdesivir. Found to have MSSA bacteremia treated with Ancef. Subsequently c/o low back pain had MRI WO which noted diskitis/osteomyelitis and epidural abscess. Patient evaluated by Neurosurgery at [place] who recommended IR intervention and transfer to [facility]. Neurosurgery consulted. Cardiology consulted for evaluation pre op. Denies chest pain shortness of breat dyspnea with exertion. PT states stress test last year, "clean". ECHO from [facility] in [place], maggie EF. EKG without ischemia.

## 2022-06-05 NOTE — H&P ADULT - HISTORY OF PRESENT ILLNESS
61M with PMHX HTN, DVT/PE, Testicular CA s/p chemo/radiation/LN Dissection c/o SOB/RICKETTS and cough admitted to [facility] for Acute Hypoxic Respiratory Failure 2/2 COVID PNA. Treated with steroids and remdesivir. Found to have MSSA bacteremia treated with Ancef. Subsequently c/o low back pain had MRI WO which noted diskitis/osteomyelitis and epidural abscess. Patient evaluated by Neurosurgery at [place] who recommended IR intervention and transfer to [facility]. Neurosurgery consulted. Pt seen/examined. Denies current complaints. Doing well. ROS negative. Neuro exam intact. Discussed with RN.     PMHX: HTN, DVT/PE, Testicular CA s/p chemo/radiation/LN Dissection  PSHX: Radical Orchiectomy, Hip Replacement, Hernia Repair  FamHx: +Mother CA +Mother DM2, +Father CAD  Social Hx: Former Smoker quit 20 years ago  NKDA

## 2022-06-05 NOTE — PATIENT PROFILE ADULT - FALL HARM RISK - HARM RISK INTERVENTIONS
Communicate Risk of Fall with Harm to all staff/Monitor for mental status changes/Reinforce activity limits and safety measures with patient and family/Tailored Fall Risk Interventions/Visual Cue: Yellow wristband and red socks/Bed in lowest position, wheels locked, appropriate side rails in place/Call bell, personal items and telephone in reach/Instruct patient to call for assistance before getting out of bed or chair/Non-slip footwear when patient is out of bed/[place] to call system/Physically safe environment - no spills, clutter or unnecessary equipment/Purposeful Proactive Rounding/Room/bathroom lighting operational, light cord in reach

## 2022-06-05 NOTE — H&P ADULT - NSHPPHYSICALEXAM_GEN_ALL_CORE
Vital Signs Last 24 Hrs  T(C): 37.1 (05 Jun 2022 00:35), Max: 37.1 (05 Jun 2022 00:35)  T(F): 98.8 (05 Jun 2022 00:35), Max: 98.8 (05 Jun 2022 00:35)  HR: 72 (05 Jun 2022 00:35) (72 - 72)  BP: 127/78 (05 Jun 2022 00:35) (127/78 - 127/78)  BP(mean): 100 (05 Jun 2022 00:35) (100 - 100)  RR: 16 (05 Jun 2022 00:35) (16 - 16)  SpO2: 94% (05 Jun 2022 00:35) (94% - 94%)    Constitutional: Well-appearing, NAD, VSS  Head: NC/AT  Eyes: PERRL, EOMI, anicteric sclera, conjunctiva WNL  ENT: Normal Pharynx, MMM, No tonsillar exudate/erythema  Neck: Supple, Non-tender  Chest: Non-tender, no rashes  Cardio: RRR, s1/s2, no appreciable murmurs/rubs/gallops  Resp: BS CTA bilaterally, no wheezing/rhonchi/rales  Abd: Soft, Non-tender, Non-distended, no rebound/guarding/rigidity  : not examined  Rectal: not examined  MSK: moving all extremities, no motor weakness, full ROM x4  Ext: palpable distal pulses, good capillary refill   Psych: appropriate, cooperative  Neuro: CN II-XII grossly intact, no focal deficits  Skin: Warm/Dry. No rashes.

## 2022-06-06 ENCOUNTER — TRANSCRIPTION ENCOUNTER (OUTPATIENT)
Age: 62
End: 2022-06-06

## 2022-06-06 ENCOUNTER — APPOINTMENT (OUTPATIENT)
Dept: NEUROSURGERY | Facility: HOSPITAL | Age: 62
End: 2022-06-06

## 2022-06-06 LAB
ALBUMIN SERPL ELPH-MCNC: 2.9 G/DL — LOW (ref 3.3–5.2)
ALP SERPL-CCNC: 95 U/L — SIGNIFICANT CHANGE UP (ref 40–120)
ALT FLD-CCNC: 21 U/L — SIGNIFICANT CHANGE UP
ANION GAP SERPL CALC-SCNC: 10 MMOL/L — SIGNIFICANT CHANGE UP (ref 5–17)
APTT BLD: 27.5 SEC — SIGNIFICANT CHANGE UP (ref 27.5–35.5)
AST SERPL-CCNC: 15 U/L — SIGNIFICANT CHANGE UP
BILIRUB SERPL-MCNC: 0.4 MG/DL — SIGNIFICANT CHANGE UP (ref 0.4–2)
BLD GP AB SCN SERPL QL: SIGNIFICANT CHANGE UP
BUN SERPL-MCNC: 23.9 MG/DL — HIGH (ref 8–20)
CALCIUM SERPL-MCNC: 8.3 MG/DL — LOW (ref 8.6–10.2)
CHLORIDE SERPL-SCNC: 102 MMOL/L — SIGNIFICANT CHANGE UP (ref 98–107)
CO2 SERPL-SCNC: 22 MMOL/L — SIGNIFICANT CHANGE UP (ref 22–29)
CREAT SERPL-MCNC: 1 MG/DL — SIGNIFICANT CHANGE UP (ref 0.5–1.3)
EGFR: 85 ML/MIN/1.73M2 — SIGNIFICANT CHANGE UP
GLUCOSE BLDC GLUCOMTR-MCNC: 152 MG/DL — HIGH (ref 70–99)
GLUCOSE SERPL-MCNC: 189 MG/DL — HIGH (ref 70–99)
HCT VFR BLD CALC: 41.9 % — SIGNIFICANT CHANGE UP (ref 39–50)
HCV AB S/CO SERPL IA: 0.15 S/CO — SIGNIFICANT CHANGE UP (ref 0–0.99)
HCV AB SERPL-IMP: SIGNIFICANT CHANGE UP
HGB BLD-MCNC: 13.8 G/DL — SIGNIFICANT CHANGE UP (ref 13–17)
INR BLD: 1.15 RATIO — SIGNIFICANT CHANGE UP (ref 0.88–1.16)
MAGNESIUM SERPL-MCNC: 1.9 MG/DL — SIGNIFICANT CHANGE UP (ref 1.6–2.6)
MCHC RBC-ENTMCNC: 29.8 PG — SIGNIFICANT CHANGE UP (ref 27–34)
MCHC RBC-ENTMCNC: 32.9 GM/DL — SIGNIFICANT CHANGE UP (ref 32–36)
MCV RBC AUTO: 90.5 FL — SIGNIFICANT CHANGE UP (ref 80–100)
MRSA PCR RESULT.: SIGNIFICANT CHANGE UP
PHOSPHATE SERPL-MCNC: 2.5 MG/DL — SIGNIFICANT CHANGE UP (ref 2.4–4.7)
PLATELET # BLD AUTO: 427 K/UL — HIGH (ref 150–400)
POTASSIUM SERPL-MCNC: 4.2 MMOL/L — SIGNIFICANT CHANGE UP (ref 3.5–5.3)
POTASSIUM SERPL-SCNC: 4.2 MMOL/L — SIGNIFICANT CHANGE UP (ref 3.5–5.3)
PROT SERPL-MCNC: 6.1 G/DL — LOW (ref 6.6–8.7)
PROTHROM AB SERPL-ACNC: 13.4 SEC — SIGNIFICANT CHANGE UP (ref 10.5–13.4)
RBC # BLD: 4.63 M/UL — SIGNIFICANT CHANGE UP (ref 4.2–5.8)
RBC # FLD: 13.4 % — SIGNIFICANT CHANGE UP (ref 10.3–14.5)
S AUREUS DNA NOSE QL NAA+PROBE: DETECTED
SODIUM SERPL-SCNC: 134 MMOL/L — LOW (ref 135–145)
WBC # BLD: 17.52 K/UL — HIGH (ref 3.8–10.5)
WBC # FLD AUTO: 17.52 K/UL — HIGH (ref 3.8–10.5)

## 2022-06-06 PROCEDURE — 63266 EXCISE INTRSPINL LESION THRC: CPT

## 2022-06-06 PROCEDURE — 99232 SBSQ HOSP IP/OBS MODERATE 35: CPT

## 2022-06-06 PROCEDURE — 72146 MRI CHEST SPINE W/O DYE: CPT | Mod: 26

## 2022-06-06 PROCEDURE — 72148 MRI LUMBAR SPINE W/O DYE: CPT | Mod: 26

## 2022-06-06 PROCEDURE — 99232 SBSQ HOSP IP/OBS MODERATE 35: CPT | Mod: 25

## 2022-06-06 DEVICE — SEALANT FLOSEAL FAST PREP HEMOSTATIC MATRIX 10ML: Type: IMPLANTABLE DEVICE | Status: FUNCTIONAL

## 2022-06-06 DEVICE — SPONGE GELFOAM SZ 100 UNCOMPRESSED: Type: IMPLANTABLE DEVICE | Status: FUNCTIONAL

## 2022-06-06 RX ORDER — METOPROLOL TARTRATE 50 MG
75 TABLET ORAL
Refills: 0 | Status: DISCONTINUED | OUTPATIENT
Start: 2022-06-06 | End: 2022-06-15

## 2022-06-06 RX ORDER — POLYETHYLENE GLYCOL 3350 17 G/17G
17 POWDER, FOR SOLUTION ORAL DAILY
Refills: 0 | Status: DISCONTINUED | OUTPATIENT
Start: 2022-06-06 | End: 2022-06-15

## 2022-06-06 RX ORDER — OXYCODONE HYDROCHLORIDE 5 MG/1
5 TABLET ORAL EVERY 4 HOURS
Refills: 0 | Status: DISCONTINUED | OUTPATIENT
Start: 2022-06-06 | End: 2022-06-13

## 2022-06-06 RX ORDER — LOSARTAN POTASSIUM 100 MG/1
25 TABLET, FILM COATED ORAL DAILY
Refills: 0 | Status: DISCONTINUED | OUTPATIENT
Start: 2022-06-06 | End: 2022-06-15

## 2022-06-06 RX ORDER — HYDROMORPHONE HYDROCHLORIDE 2 MG/ML
0.5 INJECTION INTRAMUSCULAR; INTRAVENOUS; SUBCUTANEOUS
Refills: 0 | Status: DISCONTINUED | OUTPATIENT
Start: 2022-06-06 | End: 2022-06-06

## 2022-06-06 RX ORDER — DILTIAZEM HCL 120 MG
240 CAPSULE, EXT RELEASE 24 HR ORAL DAILY
Refills: 0 | Status: DISCONTINUED | OUTPATIENT
Start: 2022-06-06 | End: 2022-06-15

## 2022-06-06 RX ORDER — SENNA PLUS 8.6 MG/1
2 TABLET ORAL AT BEDTIME
Refills: 0 | Status: DISCONTINUED | OUTPATIENT
Start: 2022-06-06 | End: 2022-06-15

## 2022-06-06 RX ORDER — PHENYLEPHRINE HYDROCHLORIDE 10 MG/ML
0.1 INJECTION INTRAVENOUS
Qty: 40 | Refills: 0 | Status: DISCONTINUED | OUTPATIENT
Start: 2022-06-06 | End: 2022-06-06

## 2022-06-06 RX ORDER — ONDANSETRON 8 MG/1
4 TABLET, FILM COATED ORAL EVERY 8 HOURS
Refills: 0 | Status: DISCONTINUED | OUTPATIENT
Start: 2022-06-06 | End: 2022-06-15

## 2022-06-06 RX ORDER — LANOLIN ALCOHOL/MO/W.PET/CERES
3 CREAM (GRAM) TOPICAL AT BEDTIME
Refills: 0 | Status: DISCONTINUED | OUTPATIENT
Start: 2022-06-06 | End: 2022-06-15

## 2022-06-06 RX ORDER — SODIUM CHLORIDE 9 MG/ML
1000 INJECTION INTRAMUSCULAR; INTRAVENOUS; SUBCUTANEOUS ONCE
Refills: 0 | Status: COMPLETED | OUTPATIENT
Start: 2022-06-06 | End: 2022-06-06

## 2022-06-06 RX ORDER — ACETAMINOPHEN 500 MG
650 TABLET ORAL EVERY 6 HOURS
Refills: 0 | Status: DISCONTINUED | OUTPATIENT
Start: 2022-06-06 | End: 2022-06-15

## 2022-06-06 RX ORDER — DIAZEPAM 5 MG
1 TABLET ORAL ONCE
Refills: 0 | Status: DISCONTINUED | OUTPATIENT
Start: 2022-06-06 | End: 2022-06-06

## 2022-06-06 RX ORDER — NOREPINEPHRINE BITARTRATE/D5W 8 MG/250ML
0.1 PLASTIC BAG, INJECTION (ML) INTRAVENOUS
Qty: 8 | Refills: 0 | Status: DISCONTINUED | OUTPATIENT
Start: 2022-06-06 | End: 2022-06-06

## 2022-06-06 RX ORDER — SODIUM CHLORIDE 9 MG/ML
1000 INJECTION, SOLUTION INTRAVENOUS
Refills: 0 | Status: DISCONTINUED | OUTPATIENT
Start: 2022-06-06 | End: 2022-06-06

## 2022-06-06 RX ORDER — ATORVASTATIN CALCIUM 80 MG/1
10 TABLET, FILM COATED ORAL AT BEDTIME
Refills: 0 | Status: DISCONTINUED | OUTPATIENT
Start: 2022-06-06 | End: 2022-06-15

## 2022-06-06 RX ORDER — CEFAZOLIN SODIUM 1 G
2000 VIAL (EA) INJECTION EVERY 8 HOURS
Refills: 0 | Status: DISCONTINUED | OUTPATIENT
Start: 2022-06-06 | End: 2022-06-15

## 2022-06-06 RX ORDER — OXYCODONE HYDROCHLORIDE 5 MG/1
10 TABLET ORAL EVERY 4 HOURS
Refills: 0 | Status: DISCONTINUED | OUTPATIENT
Start: 2022-06-06 | End: 2022-06-13

## 2022-06-06 RX ORDER — ONDANSETRON 8 MG/1
4 TABLET, FILM COATED ORAL ONCE
Refills: 0 | Status: DISCONTINUED | OUTPATIENT
Start: 2022-06-06 | End: 2022-06-06

## 2022-06-06 RX ORDER — SODIUM CHLORIDE 9 MG/ML
1000 INJECTION, SOLUTION INTRAVENOUS
Refills: 0 | Status: DISCONTINUED | OUTPATIENT
Start: 2022-06-06 | End: 2022-06-07

## 2022-06-06 RX ORDER — PANTOPRAZOLE SODIUM 20 MG/1
40 TABLET, DELAYED RELEASE ORAL
Refills: 0 | Status: DISCONTINUED | OUTPATIENT
Start: 2022-06-06 | End: 2022-06-15

## 2022-06-06 RX ORDER — MORPHINE SULFATE 50 MG/1
4 CAPSULE, EXTENDED RELEASE ORAL ONCE
Refills: 0 | Status: DISCONTINUED | OUTPATIENT
Start: 2022-06-06 | End: 2022-06-06

## 2022-06-06 RX ADMIN — HYDROMORPHONE HYDROCHLORIDE 0.5 MILLIGRAM(S): 2 INJECTION INTRAMUSCULAR; INTRAVENOUS; SUBCUTANEOUS at 18:32

## 2022-06-06 RX ADMIN — OXYCODONE HYDROCHLORIDE 10 MILLIGRAM(S): 5 TABLET ORAL at 23:18

## 2022-06-06 RX ADMIN — HYDROMORPHONE HYDROCHLORIDE 0.5 MILLIGRAM(S): 2 INJECTION INTRAMUSCULAR; INTRAVENOUS; SUBCUTANEOUS at 18:22

## 2022-06-06 RX ADMIN — LOSARTAN POTASSIUM 25 MILLIGRAM(S): 100 TABLET, FILM COATED ORAL at 06:13

## 2022-06-06 RX ADMIN — Medication 75 MILLIGRAM(S): at 06:14

## 2022-06-06 RX ADMIN — MORPHINE SULFATE 4 MILLIGRAM(S): 50 CAPSULE, EXTENDED RELEASE ORAL at 08:19

## 2022-06-06 RX ADMIN — OXYCODONE HYDROCHLORIDE 10 MILLIGRAM(S): 5 TABLET ORAL at 02:35

## 2022-06-06 RX ADMIN — Medication 100 MILLIGRAM(S): at 22:49

## 2022-06-06 RX ADMIN — Medication 240 MILLIGRAM(S): at 06:13

## 2022-06-06 RX ADMIN — MORPHINE SULFATE 4 MILLIGRAM(S): 50 CAPSULE, EXTENDED RELEASE ORAL at 07:49

## 2022-06-06 RX ADMIN — PHENYLEPHRINE HYDROCHLORIDE 3.39 MICROGRAM(S)/KG/MIN: 10 INJECTION INTRAVENOUS at 19:49

## 2022-06-06 RX ADMIN — SODIUM CHLORIDE 100 MILLILITER(S): 9 INJECTION INTRAMUSCULAR; INTRAVENOUS; SUBCUTANEOUS at 12:59

## 2022-06-06 RX ADMIN — HYDROMORPHONE HYDROCHLORIDE 0.5 MILLIGRAM(S): 2 INJECTION INTRAMUSCULAR; INTRAVENOUS; SUBCUTANEOUS at 17:49

## 2022-06-06 RX ADMIN — SODIUM CHLORIDE 75 MILLILITER(S): 9 INJECTION, SOLUTION INTRAVENOUS at 22:46

## 2022-06-06 RX ADMIN — HYDROMORPHONE HYDROCHLORIDE 0.5 MILLIGRAM(S): 2 INJECTION INTRAMUSCULAR; INTRAVENOUS; SUBCUTANEOUS at 18:00

## 2022-06-06 RX ADMIN — SODIUM CHLORIDE 1000 MILLILITER(S): 9 INJECTION INTRAMUSCULAR; INTRAVENOUS; SUBCUTANEOUS at 19:22

## 2022-06-06 RX ADMIN — HYDROMORPHONE HYDROCHLORIDE 0.5 MILLIGRAM(S): 2 INJECTION INTRAMUSCULAR; INTRAVENOUS; SUBCUTANEOUS at 20:25

## 2022-06-06 RX ADMIN — Medication 100 MILLIGRAM(S): at 04:31

## 2022-06-06 RX ADMIN — SENNA PLUS 2 TABLET(S): 8.6 TABLET ORAL at 22:48

## 2022-06-06 RX ADMIN — OXYCODONE HYDROCHLORIDE 10 MILLIGRAM(S): 5 TABLET ORAL at 22:48

## 2022-06-06 RX ADMIN — ATORVASTATIN CALCIUM 10 MILLIGRAM(S): 80 TABLET, FILM COATED ORAL at 22:48

## 2022-06-06 RX ADMIN — OXYCODONE HYDROCHLORIDE 10 MILLIGRAM(S): 5 TABLET ORAL at 03:00

## 2022-06-06 RX ADMIN — Medication 100 MILLIGRAM(S): at 12:59

## 2022-06-06 RX ADMIN — PANTOPRAZOLE SODIUM 40 MILLIGRAM(S): 20 TABLET, DELAYED RELEASE ORAL at 06:13

## 2022-06-06 RX ADMIN — Medication 3 MILLIGRAM(S): at 22:48

## 2022-06-06 RX ADMIN — HYDROMORPHONE HYDROCHLORIDE 0.5 MILLIGRAM(S): 2 INJECTION INTRAMUSCULAR; INTRAVENOUS; SUBCUTANEOUS at 20:55

## 2022-06-06 NOTE — PROGRESS NOTE ADULT - ASSESSMENT
This 61M with HTN, DVT/PE, Testicular CA s/p chemo/radiation/LN Dissection c/o SOB/RICKETTS and cough admitted to [facility] for Acute Hypoxic Respiratory Failure 2/2 COVID PNA. Treated with steroids and remdesivir. Found to have MSSA bacteremia treated with Ancef. Subsequently c/o low back pain had MRI WO which noted diskitis/osteomyelitis and epidural abscess. Patient evaluated by Neurosurgery at [place] who recommended IR intervention and transfer to [facility]. Neurosurgery consulted. Pt seen/examined. Denies current complaints. Doing well. ROS negative. Neuro exam intact. Discussed with RN.     PMHX: HTN, DVT/PE, Testicular CA s/p chemo/radiation/LN Dissection  PSHX: Radical Orchiectomy, Hip Replacement, Hernia Repair  FamHx: +Mother CA +Mother DM2, +Father CAD  Social Hx: Former Smoker quit 20 years ago  NKDA (05 Jun 2022 02:52)    patient has been having back pain; recalls episode of severe back pain in a 4D movies experience of [place] in 10/2021. he went to see a spine Dr. In [place] Who dx him with arthritis of the back and treated with pain meds.   about 2 weeks ago, he developed a boil in his RIGHT groin. He denies squeezing at it, but stated that it went away as quick as it came.     Denies IVDU.   no other skin conditions to report.     MRI and CT scan from [facility] reviewed.   MSSA identified in blood cultures on 5/29/22 x 2 sets  but cleared on 5/31/22 x 2 sets, and 6/1/22 x 2 sets.       Impression:  WBC elevation  MSSA bacteremia  Spine Osteomyelitis  thoracic spine abscess  back pan      Plan:  - neurosurgical team consulted  - patient would benefit from abscess drainage and /or debridement.   Please send surgical cultures    - continue Cefazolin 2 grams Q 8H,     WBC elevation is reactive  - will follow and trend    - follow up all outstanding cultures  - trend temperature and WBC curve  - repeat cultures from blood and all sources if febrile.    control of back pain per primary team      No contraindications from ID perspective for neurosurgical intervention.

## 2022-06-06 NOTE — BRIEF OPERATIVE NOTE - NSICDXBRIEFPROCEDURE_GEN_ALL_CORE_FT
PROCEDURES:  Laminectomy, thoracic, posterior 06-Jun-2022 17:10:53  Saulo Fernandez  
PROCEDURES:  Laminectomy, thoracic, posterior 06-Jun-2022 17:10:53  Saulo Fernandez

## 2022-06-06 NOTE — PROGRESS NOTE ADULT - SUBJECTIVE AND OBJECTIVE BOX
[facility]                                                INFECTIOUS DISEASES  =======================================================                               Chance Boggs MD#  Luis Manuel Anand MD*                                     Brielle De La Rosa MD*    Dania Villarreal MD*            Diplomates American Board of Internal Medicine & Infectious Diseases                  # [place] Office - Appt - Tel  [phone] Fax [phone]                * [place] Office - Appt - Tel [phone] Fax [phone]                                  Hospital Consult line:  [phone]  =======================================================    [id number]  DIANA JACKSONNTI  follow up:  MSSA infection, epidural abscess    s/p MRI yesterday  for OR today.     I have personally reviewed the labs and data; pertinent labs and data are listed in this note; please see below.   =======================================================  Past Medical & Surgical Hx:  =====================  PAST MEDICAL & SURGICAL HISTORY:    Problem List:  ==========  HEALTH ISSUES - PROBLEM Dx:  Preop cardiovascular exam  History of atrial fibrillation  HTN (hypertension)      Social Hx:  =======  no toxic habits currently    FAMILY HISTORY:  no significant family history of immunosuppressive disorders in mother or father   =======================================================    REVIEW OF SYSTEMS:  CONSTITUTIONAL:  No Fever or chills  HEENT:  No diplopia or blurred vision.  No earache, sore throat or runny nose.  CARDIOVASCULAR:  No pressure, squeezing, strangling, tightness, heaviness or aching about the chest, neck, axilla or epigastrium.  RESPIRATORY:  No cough, shortness of breath  GASTROINTESTINAL:  No nausea, vomiting or diarrhea.  GENITOURINARY:  No dysuria, frequency or urgency. No Blood in urine  MUSCULOSKELETAL:  BACK PAIN  SKIN:  No change in skin, hair or nails.  NEUROLOGIC:  No Headaches, seizures or weakness.  PSYCHIATRIC:  No disorder of thought or mood.  ENDOCRINE:  No heat or cold intolerance  HEMATOLOGICAL:  No easy bruising or bleeding.    =======================================================  Allergies  No Known Allergies     ======================================================  Physical Exam:  ============     General:  No acute distress.  Eye: Pupils are equal, round and reactive to light, Extraocular movements are intact, Normal conjunctiva.  HENT: Normocephalic, Oral mucosa is moist, No pharyngeal erythema, No sinus tenderness.  Neck: Supple, No lymphadenopathy.  Respiratory: Lungs are clear to auscultation, Respirations are non-labored.  Cardiovascular: Normal rate, Regular rhythm,   Gastrointestinal: Soft, Non-tender, Non-distended, Normal bowel sounds.  Genitourinary: No costovertebral angle tenderness.  Lymphatics: No lymphadenopathy neck,   Musculoskeletal: MID thoracic spine with tenderness to palpation  Integumentary: No rash.  Neurologic: Alert, Oriented, No focal deficits, Cranial Nerves II-XII are grossly intact.  Psychiatric: Appropriate mood & affect.    =======================================================  Vitals:  ============  T(F): 97.4 (2022 07:40), Max: 98.8 (2022 20:00)  HR: 67 (2022 07:40)  BP: 146/101 (2022 07:40)  RR: 17 (2022 07:40)  SpO2: 90% (2022 07:40) (88% - 95%)  temp max in last 48H T(F): , Max: 98.8 (22 @ 00:35)    =======================================================  Current Antibiotics:  ceFAZolin   IVPB      ceFAZolin   IVPB 2000 milliGRAM(s) IV Intermittent every 8 hours    Other medications:  atorvastatin 10 milliGRAM(s) Oral at bedtime  diltiazem    milliGRAM(s) Oral daily  losartan 25 milliGRAM(s) Oral daily  metoprolol tartrate 75 milliGRAM(s) Oral two times a day  pantoprazole    Tablet 40 milliGRAM(s) Oral before breakfast  polyethylene glycol 3350 17 Gram(s) Oral daily  senna 2 Tablet(s) Oral at bedtime  sodium chloride 0.9%. 1000 milliLiter(s) IV Continuous <Continuous>      =======================================================  Labs:                        13.8   17.52 )-----------( 427      ( 2022 04:50 )             41.9     06-06    134<L>  |  102  |  23.9<H>  ----------------------------<  189<H>  4.2   |  22.0  |  1.00    Ca    8.3<L>      2022 04:50  Phos  2.5     06-06  Mg     1.9     06-06    TPro  6.1<L>  /  Alb  2.9<L>  /  TBili  0.4  /  DBili  x   /  AST  15  /  ALT  21  /  AlkPhos  95  06-        C-Reactive Protein, Serum: 23 mg/L (22 @ 02:05)    Sedimentation Rate, Erythrocyte: 24 mm/hr (22 @ 02:05)    Procalcitonin, Serum: 0.12 ng/mL (22 @ 02:05)    COVID-19 PCR: NotDetec (22 @ 17:07)      =======================================================  < from: MR Lumbar Spine No Cont (22 @ 10:45) >    ACC: [id number] EXAM:  MR SPINE LUMBAR                        ACC: [id number] EXAM:  MR SPINE THORACIC                          PROCEDURE DATE:  2022          INTERPRETATION:  CLINICAL INDICATIONS: epidural abscess, surgery planning    COMPARISON: MR thoracic spine dated 2022    TECHNIQUE: Thoracic lumbar spine MRI: T2 axial, T2 sagittal, T1 sagittal   noncontrast images of the thoracic and lumbar spine    FINDINGS:    Redemonstrated is abnormal signal at T7/T8 compatible to patient's known   discitis osteomyelitis. Left arm midline ventral flattening at the T7/T8   level causing mild rightward cord compression on image 24 of series 9 is   redemonstrated and measures 8.4 x 7.7 x 30 mm. Posterior epidural   collection at the T7, T8, B3bzlviz best seen on image 8 of series 8   measuring 8.7 cm in its greatest CC dimension.    Abnormal signal changes at the L1/L2 level also concerning for   discitis/osteomyelitis.    Stable left para-aortic cyst measuring 3.8 x 3.2 x 5.5 cm. Bilateral   renal cyst.    Diffuse heterogeneous T1 marrow signal.    IMPRESSION: Grossly stable discitis/osteomyelitis at the T7/T8 level with ventral and dorsal epidural collections, as above. Stable discitis/osteomyelitis at L1/L2.    --- End of Report ---         DUSTY TANG MD; Attending Radiologist  This document has been electronically signed. 2022 11:47AM    < end of copied text >       ====================       AT chuy  COVID+ on 22  COVID negative on 22     ===============  [facility]                Patient:  DIANA GUTIERREZ    [address]                           MRN:[id number]    [place] [address]                        :1960   Sex: Male    Director:    Minor Farrell M.D.               FIN:[id number]  Ordering Physician:  Betty Sutherland             Location:  2SW; 0260; 01      Blood Cultures      PROCEDURE:                Blood Culture  [i1]      COLLECTED:                2022 06:30 EDT  SOURCE:                   Blood                    RECEIVED:                 2022 11:33 EDT  BODY SITE:                Arm R                    ACCESSION:                6--0016  FREE TEXT SOURCE:         [id number] ARM           ORDERING PHYSICIAN:       Betty Sutherland    ***FINAL REPORT***  Final Report  []                                              Reported Date/Time: 2022 09:14 EDT  Staphylococcus aureus ( From Aerobic    ***PRELIMINARY REPORT***  Preliminary Report  []                                        Reported Date/Time: 2022 12:27 EDT  Presumptive Staphylococcus aureus (From Anaerobic Bottle) , identification and susceptibility to  follow.    Preliminary Report  []                                        Reported Date/Time: 2022 03:43 EDT  Culture in progress    ***STAINS / PREPARATIONS***  GS  []                                                        Reported Date/Time: 2022 09:08 EDT  Gram positive cocci in clusters (From Anaerobic Bottle)    Results called to Suellen Londono RN of 2SW. 2022 03:45:18, by AN. read back  Gram positive cocci in clusters (From Aerobic Bottle)  Result Previously Reported.  2022 09:08:44    ***SUSCEPTIBILITY RESULTS***  Staphylococcus aureus  Antibiotic                    JOVANI Dilutn              JOVANI Interp  Ciprofloxacin                 <=0.5                   Susceptible  Clindamycin                   0.25                    Resistant  Daptomycin                    0.5                     Susceptible  Erythromycin                  1                       Resistant  Gentamicin                    <=0.5                   Susceptible  Inducible Clindamycin Test    Positive                Positive  Levofloxacin                  <=0.12                  Susceptible  Linezolid                     2                       Susceptible  Oxacillin                     0.5                     Susceptible  Tetracycline                  <=1                     Susceptible  Trimethoprim/Sulfa            <=10                    Susceptible  Vancomycin                    1                       Susceptible    Interpretive Data  i1:   Blood Culture  Test performed at: [facility]  [address], [place] [address] [phone]

## 2022-06-06 NOTE — BRIEF OPERATIVE NOTE - NSICDXBRIEFPREOP_GEN_ALL_CORE_FT
PRE-OP DIAGNOSIS:  Phlegmon 06-Jun-2022 17:11:40  Saulo Fernandez  
PRE-OP DIAGNOSIS:  Phlegmon 06-Jun-2022 17:11:40  Saulo Fernandez

## 2022-06-06 NOTE — BRIEF OPERATIVE NOTE - COMMENTS
neuromonitoring at baseline throughout the procedure; no CSF leak
2g vancomycin powder placed in the wound  2g ancef after cultures taken

## 2022-06-06 NOTE — PROGRESS NOTE ADULT - SUBJECTIVE AND OBJECTIVE BOX
NSGY Attg:    Patient seen and examined.  No acute overnight events.  + episode of increased pain this am, now better controlled.    Exam:  alert  conversant  SERNA to command  LE 5/5    I also discussed the surgical plan with the patient's significant other ([phone]).  She is in agreement with the plan for surgical intervention.    A/P:  - to OR for T6-9, possible T10, possible multilevel thoracolumbar laminectomy for evacuation of epidural abscess

## 2022-06-06 NOTE — BRIEF OPERATIVE NOTE - NSICDXBRIEFPOSTOP_GEN_ALL_CORE_FT
POST-OP DIAGNOSIS:  Phlegmon 06-Jun-2022 17:12:27  Saulo Fernandez  
POST-OP DIAGNOSIS:  Phlegmon 06-Jun-2022 17:12:27  Saulo Fernandez

## 2022-06-07 PROBLEM — Z00.00 ENCOUNTER FOR PREVENTIVE HEALTH EXAMINATION: Status: ACTIVE | Noted: 2022-06-07

## 2022-06-07 LAB
ALBUMIN SERPL ELPH-MCNC: 2.6 G/DL — LOW (ref 3.3–5.2)
ALP SERPL-CCNC: 89 U/L — SIGNIFICANT CHANGE UP (ref 40–120)
ALT FLD-CCNC: 15 U/L — SIGNIFICANT CHANGE UP
ANION GAP SERPL CALC-SCNC: 11 MMOL/L — SIGNIFICANT CHANGE UP (ref 5–17)
AST SERPL-CCNC: 17 U/L — SIGNIFICANT CHANGE UP
BASOPHILS # BLD AUTO: 0.05 K/UL — SIGNIFICANT CHANGE UP (ref 0–0.2)
BASOPHILS NFR BLD AUTO: 0.3 % — SIGNIFICANT CHANGE UP (ref 0–2)
BILIRUB SERPL-MCNC: 0.6 MG/DL — SIGNIFICANT CHANGE UP (ref 0.4–2)
BUN SERPL-MCNC: 18 MG/DL — SIGNIFICANT CHANGE UP (ref 8–20)
CALCIUM SERPL-MCNC: 8.3 MG/DL — LOW (ref 8.6–10.2)
CHLORIDE SERPL-SCNC: 97 MMOL/L — LOW (ref 98–107)
CO2 SERPL-SCNC: 23 MMOL/L — SIGNIFICANT CHANGE UP (ref 22–29)
CREAT SERPL-MCNC: 1.12 MG/DL — SIGNIFICANT CHANGE UP (ref 0.5–1.3)
EGFR: 74 ML/MIN/1.73M2 — SIGNIFICANT CHANGE UP
EOSINOPHIL # BLD AUTO: 0.03 K/UL — SIGNIFICANT CHANGE UP (ref 0–0.5)
EOSINOPHIL NFR BLD AUTO: 0.2 % — SIGNIFICANT CHANGE UP (ref 0–6)
GLUCOSE SERPL-MCNC: 178 MG/DL — HIGH (ref 70–99)
GRAM STN FLD: SIGNIFICANT CHANGE UP
GRAM STN FLD: SIGNIFICANT CHANGE UP
HCT VFR BLD CALC: 39.9 % — SIGNIFICANT CHANGE UP (ref 39–50)
HGB BLD-MCNC: 12.9 G/DL — LOW (ref 13–17)
IMM GRANULOCYTES NFR BLD AUTO: 7.8 % — HIGH (ref 0–1.5)
LYMPHOCYTES # BLD AUTO: 1.12 K/UL — SIGNIFICANT CHANGE UP (ref 1–3.3)
LYMPHOCYTES # BLD AUTO: 6.3 % — LOW (ref 13–44)
MAGNESIUM SERPL-MCNC: 2 MG/DL — SIGNIFICANT CHANGE UP (ref 1.6–2.6)
MCHC RBC-ENTMCNC: 30.2 PG — SIGNIFICANT CHANGE UP (ref 27–34)
MCHC RBC-ENTMCNC: 32.3 GM/DL — SIGNIFICANT CHANGE UP (ref 32–36)
MCV RBC AUTO: 93.4 FL — SIGNIFICANT CHANGE UP (ref 80–100)
METHOD TYPE: SIGNIFICANT CHANGE UP
MONOCYTES # BLD AUTO: 0.97 K/UL — HIGH (ref 0–0.9)
MONOCYTES NFR BLD AUTO: 5.4 % — SIGNIFICANT CHANGE UP (ref 2–14)
MSSA DNA SPEC QL NAA+PROBE: SIGNIFICANT CHANGE UP
NEUTROPHILS # BLD AUTO: 14.28 K/UL — HIGH (ref 1.8–7.4)
NEUTROPHILS NFR BLD AUTO: 80 % — HIGH (ref 43–77)
NIGHT BLUE STAIN TISS: SIGNIFICANT CHANGE UP
PLATELET # BLD AUTO: 448 K/UL — HIGH (ref 150–400)
POTASSIUM SERPL-MCNC: 3.7 MMOL/L — SIGNIFICANT CHANGE UP (ref 3.5–5.3)
POTASSIUM SERPL-SCNC: 3.7 MMOL/L — SIGNIFICANT CHANGE UP (ref 3.5–5.3)
PROT SERPL-MCNC: 6 G/DL — LOW (ref 6.6–8.7)
RBC # BLD: 4.27 M/UL — SIGNIFICANT CHANGE UP (ref 4.2–5.8)
RBC # FLD: 13.8 % — SIGNIFICANT CHANGE UP (ref 10.3–14.5)
SODIUM SERPL-SCNC: 131 MMOL/L — LOW (ref 135–145)
SPECIMEN SOURCE: SIGNIFICANT CHANGE UP
SPECIMEN SOURCE: SIGNIFICANT CHANGE UP
WBC # BLD: 17.84 K/UL — HIGH (ref 3.8–10.5)
WBC # FLD AUTO: 17.84 K/UL — HIGH (ref 3.8–10.5)

## 2022-06-07 PROCEDURE — 99232 SBSQ HOSP IP/OBS MODERATE 35: CPT

## 2022-06-07 PROCEDURE — 99233 SBSQ HOSP IP/OBS HIGH 50: CPT

## 2022-06-07 RX ADMIN — Medication 100 MILLIGRAM(S): at 14:18

## 2022-06-07 RX ADMIN — OXYCODONE HYDROCHLORIDE 10 MILLIGRAM(S): 5 TABLET ORAL at 18:31

## 2022-06-07 RX ADMIN — OXYCODONE HYDROCHLORIDE 10 MILLIGRAM(S): 5 TABLET ORAL at 04:00

## 2022-06-07 RX ADMIN — Medication 100 MILLIGRAM(S): at 21:31

## 2022-06-07 RX ADMIN — OXYCODONE HYDROCHLORIDE 10 MILLIGRAM(S): 5 TABLET ORAL at 12:08

## 2022-06-07 RX ADMIN — Medication 100 MILLIGRAM(S): at 05:18

## 2022-06-07 RX ADMIN — ATORVASTATIN CALCIUM 10 MILLIGRAM(S): 80 TABLET, FILM COATED ORAL at 21:32

## 2022-06-07 RX ADMIN — Medication 75 MILLIGRAM(S): at 07:58

## 2022-06-07 RX ADMIN — OXYCODONE HYDROCHLORIDE 10 MILLIGRAM(S): 5 TABLET ORAL at 18:59

## 2022-06-07 RX ADMIN — LOSARTAN POTASSIUM 25 MILLIGRAM(S): 100 TABLET, FILM COATED ORAL at 05:22

## 2022-06-07 RX ADMIN — OXYCODONE HYDROCHLORIDE 10 MILLIGRAM(S): 5 TABLET ORAL at 03:37

## 2022-06-07 RX ADMIN — PANTOPRAZOLE SODIUM 40 MILLIGRAM(S): 20 TABLET, DELAYED RELEASE ORAL at 05:21

## 2022-06-07 RX ADMIN — Medication 75 MILLIGRAM(S): at 18:31

## 2022-06-07 RX ADMIN — Medication 240 MILLIGRAM(S): at 05:24

## 2022-06-07 RX ADMIN — OXYCODONE HYDROCHLORIDE 10 MILLIGRAM(S): 5 TABLET ORAL at 07:58

## 2022-06-07 NOTE — PROGRESS NOTE ADULT - ASSESSMENT
This 61M with HTN, DVT/PE, Testicular CA s/p chemo/radiation/LN Dissection c/o SOB/RICKETTS and cough admitted to [facility] for Acute Hypoxic Respiratory Failure 2/2 COVID PNA. Treated with steroids and remdesivir. Found to have MSSA bacteremia treated with Ancef. Subsequently c/o low back pain had MRI WO which noted diskitis/osteomyelitis and epidural abscess. Patient evaluated by Neurosurgery at [place] who recommended IR intervention and transfer to [facility]. Neurosurgery consulted. Pt seen/examined. Denies current complaints. Doing well. ROS negative. Neuro exam intact. Discussed with RN.     PMHX: HTN, DVT/PE, Testicular CA s/p chemo/radiation/LN Dissection  PSHX: Radical Orchiectomy, Hip Replacement, Hernia Repair  FamHx: +Mother CA +Mother DM2, +Father CAD  Social Hx: Former Smoker quit 20 years ago  NKDA (05 Jun 2022 02:52)    patient has been having back pain; recalls episode of severe back pain in a 4D movies experience of [place] in 10/2021. he went to see a spine Dr. In [place] Who dx him with arthritis of the back and treated with pain meds.   about 2 weeks ago, he developed a boil in his RIGHT groin. He denies squeezing at it, but stated that it went away as quick as it came.     Denies IVDU.   no other skin conditions to report.     MRI and CT scan from [facility] reviewed.   MSSA identified in blood cultures on 5/29/22 x 2 sets  but cleared on 5/31/22 x 2 sets, and 6/1/22 x 2 sets.       Impression:  WBC elevation  MSSA bacteremia  Spine Osteomyelitis  thoracic spine abscess  back pan      Plan:  - neurosurgical team  following  s/p T8-T9 laminectomy 6/6/22; 2 grams of vanco powder placed in the wound per note.   OR cultures in process    - continue Cefazolin 2 grams Q 8H,     blood cultures from 6/5/22 are positive here  repeat blood cx ordered for 6/7/22 x  2 sets.   PICC Line only after negative blood cx.   PICC Line because of at least 6 weeks of IV antibiotics.   midline is NOT an alternative.     WBC elevation is reactive  - will follow and trend    control of back pain per primary team      - follow up all outstanding cultures  - trend temperature and WBC curve  - repeat cultures from blood and all sources if febrile.      reagarding COVID-19 testing  positive in [place], then negative prior to transfer, and negative here.   no evidence of Clinical COVID-19  will not treat  Isolation guidance as per infection control

## 2022-06-07 NOTE — PROGRESS NOTE ADULT - SUBJECTIVE AND OBJECTIVE BOX
PA Student Note:  HPI:  HPI: 61M with PMHx testicular CA (5 years s/o chemo/radiation/LN dissection), HTN, DVT/PE admitted to [place] for symptomatic COVID, started on abx, then developed severe back pain x 4 days with MRI showing epidural collection and transferred to [facility].  Pt seen/examined. Denies current complaints.  Neuro exam intact. Discussed with RN.    PMHX: HTN, DVT/PE, Testicular CA s/p chemo/radiation/LN Dissection  PSHX: Orchiectomy, FRANKI hernia  FamHx: +Mother CA +Mother DM2, +Father CAD  Social Hx: Former Smoker quit 20 years ago  NKDA (05 Jun 2022 02:52)      INTERVAL HPI/OVERNIGHT EVENTS:  61y Male s/p T6-9 laminectomy for epidural abscess evacuation POD1 seen lying comfortably in bed. Tolerating diet. Passing gas/BM. Voiding. Alford removed. Denies headache, weakness, numbness, n/v/d, fevers, chills, chest pain, SOB. CRISTIAN drain output 110 mL serosanguinous in past 24 hrs.    Vital Signs Last 24 Hrs  T(C): 35.8 (07 Jun 2022 08:00), Max: 36.7 (06 Jun 2022 21:13)  T(F): 96.5 (07 Jun 2022 08:00), Max: 98 (06 Jun 2022 21:13)  HR: 71 (07 Jun 2022 04:00) (71 - 96)  BP: 109/80 (07 Jun 2022 04:00) (96/77 - 134/85)  BP(mean): 91 (07 Jun 2022 04:00) (82 - 105)  RR: 16 (07 Jun 2022 04:00) (10 - 19)  SpO2: 96% (07 Jun 2022 04:00) (94% - 100%)    PHYSICAL EXAM:  GENERAL: NAD, well-groomed  HEAD:  Atraumatic, normocephalic  DRAINS: Intact, serosanguinous fluid draining  WOUND: Dressing clean dry intact  DOMINICK COMA SCORE: E- V- M- = 15       E: 4= opens eyes spontaneously 3= to voice 2= to noxious 1= no opening       V: 5= oriented 4= confused 3= inappropriate words 2= incomprehensible sounds 1= nonverbal 1T= intubated       M: 6= follows commands 5= localizes 4= withdraws 3= flexor posturing 2= extensor posturing 1= no movement  MENTAL STATUS: AAO x3; Awake; Opens eyes spontaneously; Appropriately conversant without aphasia; following simple commands  CRANIAL NERVES: Visual acuity normal for age, visual fields full to confrontation, PERRL. EOMI without nystagmus. Facial sensation intact V1-3 distribution b/l. Face symmetric w/ normal eye closure and smile, tongue midline. Hearing grossly intact. Speech clear. Head turning and shoulder shrug intact.   REFLEXES: PERRL. Corneals intact b/l.  MOTOR: strength 5/5 b/l upper and lower extremities  SENSATION: Grossly intact to light touch all extremities  COORDINATION: Gait intact; no upper extremity dysmetria  CHEST/LUNG: Clear to auscultation bilaterally; no rales, rhonchi, wheezing, or rubs  HEART: +S1/+S2; Regular rate and rhythm; no murmurs, rubs, or gallops  ABDOMEN: Soft, nontender, nondistended; bowel sounds present all four quadrants  EXTREMITIES:  2+ peripheral pulses, no clubbing, cyanosis, or edema  SKIN: Warm, dry; no rashes or lesions    LABS:                        12.9   17.84 )-----------( 448      ( 07 Jun 2022 06:15 )             39.9     06-07    131<L>  |  97<L>  |  18.0  ----------------------------<  178<H>  3.7   |  23.0  |  1.12    Ca    8.3<L>      07 Jun 2022 06:15  Phos  2.5     06-06  Mg     2.0     06-07    TPro  6.0<L>  /  Alb  2.6<L>  /  TBili  0.6  /  DBili  x   /  AST  17  /  ALT  15  /  AlkPhos  89  06-07    PT/INR - ( 06 Jun 2022 04:50 )   PT: 13.4 sec;   INR: 1.15 ratio         PTT - ( 06 Jun 2022 04:50 )  PTT:27.5 sec      06-06 @ 07:01  -  06-07 @ 07:00  --------------------------------------------------------  IN: 975 mL / OUT: 3560 mL / NET: -2585 mL        RADIOLOGY & ADDITIONAL TESTS:

## 2022-06-07 NOTE — PROGRESS NOTE ADULT - ASSESSMENT
61M with PMHx testicular CA s/p chemotherapy/radiation/LN dissection admitted for T6-10 phlegmon and T7/8 abscess s/p T6-9 laminectomy for epidural abscess evacuation. Patient condition improving, vitals stable.  -Continue Ancef 2g every 8 hrs. Abscess cultures pending.  -Change wound dressing daily, monitor CRISTIAN output daily  -Increase patient activity: out of bed to chair + PT intervention  -F/u ID for antibiotic selection pending surgical cultures.  -Consider PICC placement for ongoing antibiotic regimen

## 2022-06-07 NOTE — PROGRESS NOTE ADULT - SUBJECTIVE AND OBJECTIVE BOX
[facility]                                                         CARDIOLOGY AT [facility]                                                                  [address], [place] [address]                                                         Telephone: [phone]. Fax:[phone]                                                                             PROGRESS NOTE    Reason for follow up: s/p multilevel thoracolumbar laminectomy, now with CRISTIAN drain, washout epidural phlegmon    Update: patient in NSR resting comfortably      Review of symptoms:   Cardiac:  No chest pain. No dyspnea. No palpitations.  Respiratory: no cough. No dyspnea  Gastrointestinal: No diarrhea. No abdominal pain. No bleeding.   Neuro: No focal neuro complaints.    Vitals:  T(C): 35.8 (06-07-22 @ 08:00), Max: 36.7 (06-06-22 @ 21:13)  HR: 71 (06-07-22 @ 04:00) (71 - 96)  BP: 109/80 (06-07-22 @ 04:00) (96/77 - 134/85)  RR: 16 (06-07-22 @ 04:00) (10 - 19)  SpO2: 96% (06-07-22 @ 04:00) (94% - 100%)  Wt(kg): --  I&O's Summary    06 Jun 2022 07:01  -  07 Jun 2022 07:00  --------------------------------------------------------  IN: 975 mL / OUT: 3560 mL / NET: -2585 mL      Weight (kg): 90.4 (06-05 @ 00:35)    PHYSICAL EXAM:  Appearance: Comfortable. No acute distress  HEENT:  Atraumatic. Normocephalic.  Normal oral mucosa  Neurologic: A & O x 3, no gross focal deficits. CRISTIAN drain to lumbar spine  Cardiovascular: RRR S1 S2, No murmur, no rubs/gallops. No JVD  Respiratory: Lungs clear to auscultation, unlabored   Gastrointestinal:  Soft, Non-tender, + BS  Lower Extremities: 2+ Peripheral Pulses, No clubbing, cyanosis, or edema  Psychiatry: Patient is calm. No agitation.   Skin: warm and dry.    CURRENT CARDIAC MEDICATIONS:  diltiazem    milliGRAM(s) Oral daily  losartan 25 milliGRAM(s) Oral daily  metoprolol tartrate 75 milliGRAM(s) Oral two times a day      CURRENT OTHER MEDICATIONS:  ceFAZolin   IVPB 2000 milliGRAM(s) IV Intermittent every 8 hours  acetaminophen     Tablet .. 650 milliGRAM(s) Oral every 6 hours PRN Temp greater or equal to 38C (100.4F), Mild Pain (1 - 3)  melatonin 3 milliGRAM(s) Oral at bedtime PRN Insomnia  ondansetron Injectable 4 milliGRAM(s) IV Push every 8 hours PRN Nausea and/or Vomiting  oxyCODONE    IR 5 milliGRAM(s) Oral every 4 hours PRN Moderate Pain (4 - 6)  oxyCODONE    IR 10 milliGRAM(s) Oral every 4 hours PRN Severe Pain (7 - 10)  aluminum hydroxide/magnesium hydroxide/simethicone Suspension 30 milliLiter(s) Oral every 4 hours PRN Dyspepsia  pantoprazole    Tablet 40 milliGRAM(s) Oral before breakfast  polyethylene glycol 3350 17 Gram(s) Oral daily  senna 2 Tablet(s) Oral at bedtime  atorvastatin 10 milliGRAM(s) Oral at bedtime      LABS:	 	                            12.9   17.84 )-----------( 448      ( 07 Jun 2022 06:15 )             39.9     06-07    131<L>  |  97<L>  |  18.0  ----------------------------<  178<H>  3.7   |  23.0  |  1.12    Ca    8.3<L>      07 Jun 2022 06:15  Phos  2.5     06-06  Mg     2.0     06-07    TPro  6.0<L>  /  Alb  2.6<L>  /  TBili  0.6  /  DBili  x   /  AST  17  /  ALT  15  /  AlkPhos  89  06-07    PT/INR/PTT ( 06 Jun 2022 04:50 )                       :                       :      13.4         :       27.5                  .        .                   .              .           .       1.15        .                                       Lipid Profile:   HgA1c:   TSH:     TELEMETRY: NSR 60s  ECG:    DIAGNOSTIC TESTING:  [ ] Echocardiogram:   [ ]  Catheterization:  [ ] Stress Test:    OTHER:

## 2022-06-07 NOTE — PROGRESS NOTE ADULT - SUBJECTIVE AND OBJECTIVE BOX
DIANA GUTIERREZ    [id number]    61y      Male    INTERVAL HPI/OVERNIGHT EVENTS: patient being seen for discitis/ epidural abscess. Patient is s.p Laminectomy post op day #1.     Patient seen at bedside and states feeling better.      REVIEW OF SYSTEMS:    CONSTITUTIONAL: No fever, weight loss, or fatigue  RESPIRATORY: No cough, wheezing, hemoptysis; No shortness of breath  CARDIOVASCULAR: No chest pain, palpitations  GASTROINTESTINAL: No abdominal or epigastric pain. No nausea, vomiting  NEUROLOGICAL: No headaches, memory loss, loss of strength.  MISCELLANEOUS:      Vital Signs Last 24 Hrs  T(C): 35.8 (07 Jun 2022 08:00), Max: 36.7 (06 Jun 2022 21:13)  T(F): 96.5 (07 Jun 2022 08:00), Max: 98 (06 Jun 2022 21:13)  HR: 71 (07 Jun 2022 04:00) (71 - 96)  BP: 109/80 (07 Jun 2022 04:00) (96/77 - 134/85)  BP(mean): 91 (07 Jun 2022 04:00) (82 - 105)  RR: 16 (07 Jun 2022 04:00) (10 - 19)  SpO2: 96% (07 Jun 2022 04:00) (94% - 100%)    PHYSICAL EXAM:    Constitutional: Well-appearing, NAD, VSS  Head: NC/AT  Eyes: PERRL, EOMI, anicteric sclera, conjunctiva WNL  Neck: Supple, Non-tender  Cardio: RRR, s1/s2, no appreciable murmurs  Resp: BS CTA bilaterally, no wheezing  Abd: Soft, Non-tender, Non-distended, no rebound/guarding/rigidity  MSK: moving all extremities, no motor weakness, full ROM x4  Ext: palpable distal pulses, good capillary refill   Psych: appropriate, cooperative  Neuro: CN II-XII grossly intact, no focal deficits  Skin: Warm/Dry. No rashes.      LABS:                        12.9   17.84 )-----------( 448      ( 07 Jun 2022 06:15 )             39.9     06-07    131<L>  |  97<L>  |  18.0  ----------------------------<  178<H>  3.7   |  23.0  |  1.12    Ca    8.3<L>      07 Jun 2022 06:15  Phos  2.5     06-06  Mg     2.0     06-07    TPro  6.0<L>  /  Alb  2.6<L>  /  TBili  0.6  /  DBili  x   /  AST  17  /  ALT  15  /  AlkPhos  89  06-07    PT/INR - ( 06 Jun 2022 04:50 )   PT: 13.4 sec;   INR: 1.15 ratio         PTT - ( 06 Jun 2022 04:50 )  PTT:27.5 sec        MEDICATIONS  (STANDING):  atorvastatin 10 milliGRAM(s) Oral at bedtime  ceFAZolin   IVPB 2000 milliGRAM(s) IV Intermittent every 8 hours  diltiazem    milliGRAM(s) Oral daily  losartan 25 milliGRAM(s) Oral daily  metoprolol tartrate 75 milliGRAM(s) Oral two times a day  pantoprazole    Tablet 40 milliGRAM(s) Oral before breakfast  polyethylene glycol 3350 17 Gram(s) Oral daily  senna 2 Tablet(s) Oral at bedtime    MEDICATIONS  (PRN):  acetaminophen     Tablet .. 650 milliGRAM(s) Oral every 6 hours PRN Temp greater or equal to 38C (100.4F), Mild Pain (1 - 3)  aluminum hydroxide/magnesium hydroxide/simethicone Suspension 30 milliLiter(s) Oral every 4 hours PRN Dyspepsia  melatonin 3 milliGRAM(s) Oral at bedtime PRN Insomnia  ondansetron Injectable 4 milliGRAM(s) IV Push every 8 hours PRN Nausea and/or Vomiting  oxyCODONE    IR 5 milliGRAM(s) Oral every 4 hours PRN Moderate Pain (4 - 6)  oxyCODONE    IR 10 milliGRAM(s) Oral every 4 hours PRN Severe Pain (7 - 10)      RADIOLOGY & ADDITIONAL TESTS:

## 2022-06-07 NOTE — PROGRESS NOTE ADULT - ASSESSMENT
61M with PMHX HTN, DVT/PE, Afib 20-24y/o DCCV, Testicular CA s/p chemo/radiation/LN Dissection c/o SOB/RICKETTS and cough admitted to [facility] for Acute Hypoxic Respiratory Failure 2/2 COVID PNA. Treated with steroids and remdesivir. Found to have MSSA bacteremia treated with Ancef. Subsequently c/o low back pain had MRI WO which noted diskitis/osteomyelitis and epidural abscess. Patient evaluated by Neurosurgery at [place] who recommended IR intervention and transfer to [facility]. Neurosurgery consulted. Cardiology consulted for evaluation pre op. Denies chest pain shortness of breat dyspnea with exertion. PT states stress test last year, "clean". ECHO from [place] in HIE, maggie EF. EKG without ischemia.

## 2022-06-07 NOTE — PROGRESS NOTE ADULT - PROBLEM SELECTOR PLAN 2
.  - no Afib noted on telemetry .  - no Afib noted on telemetry    No further inpatient cardiac work up at this time.  Will sign off.  Please reconsult if needed.

## 2022-06-07 NOTE — PROGRESS NOTE ADULT - SUBJECTIVE AND OBJECTIVE BOX
[facility]                                                INFECTIOUS DISEASES  =======================================================                               Chance Boggs MD#  Luis Manuel Anand MD*                                     Brielle De La Rosa MD*    Dania Villarreal MD*            Diplomates American Board of Internal Medicine & Infectious Diseases                  # [place] Office - Appt - Tel  [phone] Fax [phone]                * [place] Office - Appt - Tel [phone] Fax [phone]                                  Hospital Consult line:  [phone]  =======================================================    [id number]  DIANA JACKSONNTI  follow up:  MSSA infection, epidural abscess    no new issues  s/p OR debridement of the thoracic spine on 22  cultures sent.         I have personally reviewed the labs and data; pertinent labs and data are listed in this note; please see below.   =======================================================  Past Medical & Surgical Hx:  =====================  PAST MEDICAL & SURGICAL HISTORY:    Problem List:  ==========  HEALTH ISSUES - PROBLEM Dx:  Preop cardiovascular exam  History of atrial fibrillation  HTN (hypertension)      Social Hx:  =======  no toxic habits currently    FAMILY HISTORY:  no significant family history of immunosuppressive disorders in mother or father   =======================================================    REVIEW OF SYSTEMS:  CONSTITUTIONAL:  No Fever or chills  HEENT:  No diplopia or blurred vision.  No earache, sore throat or runny nose.  CARDIOVASCULAR:  No pressure, squeezing, strangling, tightness, heaviness or aching about the chest, neck, axilla or epigastrium.  RESPIRATORY:  No cough, shortness of breath  GASTROINTESTINAL:  No nausea, vomiting or diarrhea.  GENITOURINARY:  No dysuria, frequency or urgency. No Blood in urine  MUSCULOSKELETAL:  BACK PAIN  SKIN:  No change in skin, hair or nails.  NEUROLOGIC:  No Headaches, seizures or weakness.  PSYCHIATRIC:  No disorder of thought or mood.  ENDOCRINE:  No heat or cold intolerance  HEMATOLOGICAL:  No easy bruising or bleeding.    =======================================================  Allergies  No Known Allergies     ======================================================  Physical Exam:  ============     General:  No acute distress.  Eye: Pupils are equal, round and reactive to light, Extraocular movements are intact, Normal conjunctiva.  HENT: Normocephalic, Oral mucosa is moist, No pharyngeal erythema, No sinus tenderness.  Neck: Supple, No lymphadenopathy.  Respiratory: Lungs are clear to auscultation, Respirations are non-labored.  Cardiovascular: Normal rate, Regular rhythm,   Gastrointestinal: Soft, Non-tender, Non-distended, Normal bowel sounds.  Genitourinary: No costovertebral angle tenderness.  Lymphatics: No lymphadenopathy neck,   Musculoskeletal:  mid thoracic spine with tenderness to palpation; + drain  Integumentary: No rash.  Neurologic: Alert, Oriented, No focal deficits, Cranial Nerves II-XII are grossly intact.  Psychiatric: Appropriate mood & affect.    =======================================================  Vitals:  ============  T(F): 96.5 (2022 08:00), Max: 98 (2022 21:13)  HR: 71 (2022 04:00)  BP: 109/80 (2022 04:00)  RR: 16 (2022 04:00)  SpO2: 96% (2022 04:00) (94% - 100%)  temp max in last 48H T(F): , Max: 98.8 (22 @ 20:00)    =======================================================  Current Antibiotics:  ceFAZolin   IVPB 2000 milliGRAM(s) IV Intermittent every 8 hours    Other medications:  atorvastatin 10 milliGRAM(s) Oral at bedtime  diltiazem    milliGRAM(s) Oral daily  losartan 25 milliGRAM(s) Oral daily  metoprolol tartrate 75 milliGRAM(s) Oral two times a day  pantoprazole    Tablet 40 milliGRAM(s) Oral before breakfast  polyethylene glycol 3350 17 Gram(s) Oral daily  senna 2 Tablet(s) Oral at bedtime      =======================================================  Labs:                        12.9   17.84 )-----------( 448      ( 2022 06:15 )             39.9     WBC Count: 17.84 K/uL (22 @ 06:15)  WBC Count: 17.52 K/uL (22 @ 04:50)  WBC Count: 18.03 K/uL (22 @ 02:05)        131<L>  |  97<L>  |  18.0  ----------------------------<  178<H>  3.7   |  23.0  |  1.12    Ca    8.3<L>      2022 06:15  Phos  2.5       Mg     2.0         TPro  6.0<L>  /  Alb  2.6<L>  /  TBili  0.6  /  DBili  x   /  AST  17  /  ALT  15  /  AlkPhos  89        Culture - Tissue with Gram Stain (collected 22 @ 02:26)  Source: .Tissue epidural phlegmon  Gram Stain (22 @ 03:24):    No polymorphonuclear leukocytes seen per low power field    No organisms seen per oil power field    Culture - Blood (collected 22 @ 02:06)  Source: .Blood Blood-Peripheral  Gram Stain (22 @ 09:08):    Growth in anaerobic bottle: Gram Positive Cocci in Clusters    ***Blood Panel PCR results on this specimen are available    approximately 3 hours after the Gram stain result.***    Gram stain, PCR, and/or culture results may not always    correspond due todifference in methodologies.    ************************************************************    This PCR assay was performed using [employer].    The following targets are tested for: Enterococcus,    vancomycin resistant enterococci, Listeria monocytogenes,    coagulase negative staphylococci, S. aureus,    methicillin resistant S. aureus, Streptococcus agalactiae    (Group B), S. pneumoniae, S. pyogenes (Group A),    Acinetobacter baumannii, Enterobacter cloacae, E. coli,    Klebsiella oxytoca, K. pneumoniae, Proteus sp.,    Serratia marcescens, Haemophilus influenzae,    Neisseria meningitidis, Pseudomonas aeruginosa, Candida    albicans, C. glabrata, C krusei, C parapsilosis,    C. tropicalis and the KPC resistance gene.    Gram Stain and BCID performed by:    [facility]    [address] [place] [id number]    .    TYPE: (C=Critical, N=Notification, A=Abnormal) C    TESTS:  _ GS    DATE/TIME CALLED: _ 2022 09:07:29    CALLED TO: _ Roz Up RN    READ BACK (2 Patient Identifiers)(Y/N): _ Y    READ BACK VALUES (Y/N): _ Y    CALLED BY: Johnie Gardner  Organism: Blood Culture PCR (22 @ 09:16)  Organism: Blood Culture PCR (22 @ 09:16)    Sensitivities:      -  Methicillin SENSITIVE Staphylococcus aureus (MSSA): Detec Any isolate of Staphylococcus aureus from a blood culture is NOT considered a contaminant.      Method Type: PCR    Culture - Blood (collected 22 @ 02:05)  Source: .Blood Blood-Peripheral        C-Reactive Protein, Serum: 23 mg/L (22 @ 02:05)    Sedimentation Rate, Erythrocyte: 24 mm/hr (22 @ 02:05)    Procalcitonin, Serum: 0.12 ng/mL (22 @ 02:05)    COVID-19 PCR: NotDetec (22 @ 17:07)      =======================================================     < from: MR Lumbar Spine No Cont (22 @ 10:45) >    ACC: [id number] EXAM:  MR SPINE LUMBAR                        ACC: [id number] EXAM:  MR SPINE THORACIC                          PROCEDURE DATE:  2022          INTERPRETATION:  CLINICAL INDICATIONS: epidural abscess, surgery planning    COMPARISON: MR thoracic spine dated 2022    TECHNIQUE: Thoracic lumbar spine MRI: T2 axial, T2 sagittal, T1 sagittal   noncontrast images of the thoracic and lumbar spine    FINDINGS:    Redemonstrated is abnormal signal at T7/T8 compatible to patient's known   discitis osteomyelitis. Left arm midline ventral flattening at the T7/T8   level causing mild rightward cord compression on image 24 of series 9 is   redemonstrated and measures 8.4 x 7.7 x 30 mm. Posterior epidural   collection at the T7, T8, [id number] best seen on image 8 of series 8   measuring 8.7 cm in its greatest CC dimension.    Abnormal signal changes at the L1/L2 level also concerning for   discitis/osteomyelitis.    Stable left para-aortic cyst measuring 3.8 x 3.2 x 5.5 cm. Bilateral   renal cyst.    Diffuse heterogeneous T1 marrow signal.    IMPRESSION: Grossly stable discitis/osteomyelitis at the T7/T8 level with ventral and dorsal epidural collections, as above. Stable discitis/osteomyelitis at L1/L2.    --- End of Report ---         DUSTY TANG MD; Attending Radiologist  This document has been electronically signed. 2022 11:47AM    < end of copied text >       ====================       AT [place]  COVID+ on 22  COVID negative on 22     ===============  [facility]                Patient:  DIANA GUTIERREZ    [address] [place] [address]                           MRN:[id number]    [place] [address]                        :1960   Sex: Male    Director:    Minor Farrell M.D.               FIN:[id number]  Ordering Physician:  Betty Sutherland             Location:  2[facility]; 0260; 01      Blood Cultures      PROCEDURE:                Blood Culture  [i1]      COLLECTED:                2022 06:30 EDT  SOURCE:                   Blood                    RECEIVED:                 2022 11:33 EDT  BODY SITE:                Arm R                    ACCESSION:                9--[id number]  FREE TEXT SOURCE:         [id number]           ORDERING PHYSICIAN:       Betty Sutherland    ***FINAL REPORT***  Final Report  []                                              Reported Date/Time: 2022 09:14 EDT  Staphylococcus aureus ( From Aerobic    ***PRELIMINARY REPORT***  Preliminary Report  []                                        Reported Date/Time: 2022 12:27 EDT  Presumptive Staphylococcus aureus (From Anaerobic Bottle) , identification and susceptibility to  follow.    Preliminary Report  []                                        Reported Date/Time: 2022 03:43 EDT  Culture in progress    ***STAINS / PREPARATIONS***  GS  []                                                        Reported Date/Time: 2022 09:08 EDT  Gram positive cocci in clusters (From Anaerobic Bottle)    Results called to Suellen Londono RN of 2SW. 2022 03:45:18, by AN. read back  Gram positive cocci in clusters (From Aerobic Bottle)  Result Previously Reported.  2022 09:08:44    ***SUSCEPTIBILITY RESULTS***  Staphylococcus aureus  Antibiotic                    JOVANI Dilutn              JOVANI Interp  Ciprofloxacin                 <=0.5                   Susceptible  Clindamycin                   0.25                    Resistant  Daptomycin                    0.5                     Susceptible  Erythromycin                  1                       Resistant  Gentamicin                    <=0.5                   Susceptible  Inducible Clindamycin Test    Positive                Positive  Levofloxacin                  <=0.12                  Susceptible  Linezolid                     2                       Susceptible  Oxacillin                     0.5                     Susceptible  Tetracycline                  <=1                     Susceptible  Trimethoprim/Sulfa            <=10                    Susceptible  Vancomycin                    1                       Susceptible    Interpretive Data  i1:   Blood Culture  Test performed at: [facility]  [address] [place], [place] [address] [phone]

## 2022-06-07 NOTE — PROGRESS NOTE ADULT - PROBLEM SELECTOR PLAN 1
.  - post-op day 1 multilevel thoracolumbar laminectomy, now with CRISTIAN drain, washout epidural phlegmon    - CRISTIAN drain in place  - NSR 60s, no complaints of chest pain  - on room air  - continue current medication HCTZ, metoprolol  - stable from cardiac standpoint  - recommend patient follow up with primary cardiologist 2 weeks post discharge None

## 2022-06-07 NOTE — PROGRESS NOTE ADULT - ASSESSMENT
61M with PMHX HTN, DVT/PE, Testicular CA s/p chemo/radiation/LN Dissection c/o SOB/RICKETTS and cough admitted to [facility] for Acute Hypoxic Respiratory Failure 2/2 COVID PNA with hospital course c/b multiple electrolyte abnormalities and MSSA Bacteremia subsequently found to have Diskitis/Osteomyelitis and Epidural Abscess for which he was transferred to [facility] for IR and Neurosurgical evaluation/intervention.    PLAN:  Diskitis/Osteomyelitis/Epidural Abscess r/o Cord Compression/Cauda Equina  s/p Laminectomy post op day #1.   - ID and neurosurgery following   - iv abx as per ID    Acute Hypoxic Resp Failure 2/2 COVID PNA  -Vacccinated x2  -CTA Chest negative for PE  -Completed Remdesivir at [place]    MSSA Bacteremia  -plan as per ID    HTN, HLD  -Cardizem 240mg PO q24  -Metoprolol Tartrate 75mg PO BID  -Losartan 25mg PO q24    Testicular CA s/p chemo/radiation/LN Dissection  -PETSCAN 6-7 mos ago negative for malignancy  -CT ABD findings likely known Lymphocele / postsurgical changes from prior LN dissection  -Outpatient F/u [facility]     dvt prop  - scd

## 2022-06-08 LAB
-  AMPICILLIN/SULBACTAM: SIGNIFICANT CHANGE UP
-  CEFAZOLIN: SIGNIFICANT CHANGE UP
-  CLINDAMYCIN: SIGNIFICANT CHANGE UP
-  ERYTHROMYCIN: SIGNIFICANT CHANGE UP
-  GENTAMICIN: SIGNIFICANT CHANGE UP
-  OXACILLIN: SIGNIFICANT CHANGE UP
-  PENICILLIN: SIGNIFICANT CHANGE UP
-  RIFAMPIN: SIGNIFICANT CHANGE UP
-  TETRACYCLINE: SIGNIFICANT CHANGE UP
-  TRIMETHOPRIM/SULFAMETHOXAZOLE: SIGNIFICANT CHANGE UP
-  VANCOMYCIN: SIGNIFICANT CHANGE UP
ALBUMIN SERPL ELPH-MCNC: 2.8 G/DL — LOW (ref 3.3–5.2)
ALP SERPL-CCNC: 92 U/L — SIGNIFICANT CHANGE UP (ref 40–120)
ALT FLD-CCNC: 13 U/L — SIGNIFICANT CHANGE UP
ANION GAP SERPL CALC-SCNC: 8 MMOL/L — SIGNIFICANT CHANGE UP (ref 5–17)
AST SERPL-CCNC: 15 U/L — SIGNIFICANT CHANGE UP
BASOPHILS # BLD AUTO: 0 K/UL — SIGNIFICANT CHANGE UP (ref 0–0.2)
BASOPHILS NFR BLD AUTO: 0 % — SIGNIFICANT CHANGE UP (ref 0–2)
BILIRUB SERPL-MCNC: 0.7 MG/DL — SIGNIFICANT CHANGE UP (ref 0.4–2)
BUN SERPL-MCNC: 18.6 MG/DL — SIGNIFICANT CHANGE UP (ref 8–20)
CALCIUM SERPL-MCNC: 8.8 MG/DL — SIGNIFICANT CHANGE UP (ref 8.6–10.2)
CHLORIDE SERPL-SCNC: 96 MMOL/L — LOW (ref 98–107)
CO2 SERPL-SCNC: 27 MMOL/L — SIGNIFICANT CHANGE UP (ref 22–29)
CREAT SERPL-MCNC: 1.16 MG/DL — SIGNIFICANT CHANGE UP (ref 0.5–1.3)
EGFR: 71 ML/MIN/1.73M2 — SIGNIFICANT CHANGE UP
EOSINOPHIL # BLD AUTO: 0.15 K/UL — SIGNIFICANT CHANGE UP (ref 0–0.5)
EOSINOPHIL NFR BLD AUTO: 0.9 % — SIGNIFICANT CHANGE UP (ref 0–6)
GLUCOSE SERPL-MCNC: 147 MG/DL — HIGH (ref 70–99)
GRAM STN FLD: SIGNIFICANT CHANGE UP
HCT VFR BLD CALC: 40.1 % — SIGNIFICANT CHANGE UP (ref 39–50)
HGB BLD-MCNC: 13 G/DL — SIGNIFICANT CHANGE UP (ref 13–17)
LYMPHOCYTES # BLD AUTO: 19.5 % — SIGNIFICANT CHANGE UP (ref 13–44)
LYMPHOCYTES # BLD AUTO: 3.14 K/UL — SIGNIFICANT CHANGE UP (ref 1–3.3)
MAGNESIUM SERPL-MCNC: 2 MG/DL — SIGNIFICANT CHANGE UP (ref 1.6–2.6)
MANUAL SMEAR VERIFICATION: SIGNIFICANT CHANGE UP
MCHC RBC-ENTMCNC: 30.2 PG — SIGNIFICANT CHANGE UP (ref 27–34)
MCHC RBC-ENTMCNC: 32.4 GM/DL — SIGNIFICANT CHANGE UP (ref 32–36)
MCV RBC AUTO: 93.3 FL — SIGNIFICANT CHANGE UP (ref 80–100)
METAMYELOCYTES # FLD: 0.9 % — HIGH (ref 0–0)
METHOD TYPE: SIGNIFICANT CHANGE UP
MONOCYTES # BLD AUTO: 0.85 K/UL — SIGNIFICANT CHANGE UP (ref 0–0.9)
MONOCYTES NFR BLD AUTO: 5.3 % — SIGNIFICANT CHANGE UP (ref 2–14)
MYELOCYTES NFR BLD: 3.5 % — HIGH (ref 0–0)
NEUTROPHILS # BLD AUTO: 10.98 K/UL — HIGH (ref 1.8–7.4)
NEUTROPHILS NFR BLD AUTO: 67.2 % — SIGNIFICANT CHANGE UP (ref 43–77)
NEUTS BAND # BLD: 0.9 % — SIGNIFICANT CHANGE UP (ref 0–8)
PLAT MORPH BLD: NORMAL — SIGNIFICANT CHANGE UP
PLATELET # BLD AUTO: 408 K/UL — HIGH (ref 150–400)
POTASSIUM SERPL-MCNC: 4.8 MMOL/L — SIGNIFICANT CHANGE UP (ref 3.5–5.3)
POTASSIUM SERPL-SCNC: 4.8 MMOL/L — SIGNIFICANT CHANGE UP (ref 3.5–5.3)
PROMYELOCYTES # FLD: 0.9 % — HIGH (ref 0–0)
PROT SERPL-MCNC: 6.4 G/DL — LOW (ref 6.6–8.7)
RBC # BLD: 4.3 M/UL — SIGNIFICANT CHANGE UP (ref 4.2–5.8)
RBC # FLD: 13.4 % — SIGNIFICANT CHANGE UP (ref 10.3–14.5)
RBC BLD AUTO: NORMAL — SIGNIFICANT CHANGE UP
SODIUM SERPL-SCNC: 131 MMOL/L — LOW (ref 135–145)
VARIANT LYMPHS # BLD: 0.9 % — SIGNIFICANT CHANGE UP (ref 0–6)
WBC # BLD: 16.12 K/UL — HIGH (ref 3.8–10.5)
WBC # FLD AUTO: 16.12 K/UL — HIGH (ref 3.8–10.5)

## 2022-06-08 PROCEDURE — 99232 SBSQ HOSP IP/OBS MODERATE 35: CPT

## 2022-06-08 PROCEDURE — 99233 SBSQ HOSP IP/OBS HIGH 50: CPT

## 2022-06-08 RX ADMIN — OXYCODONE HYDROCHLORIDE 5 MILLIGRAM(S): 5 TABLET ORAL at 21:02

## 2022-06-08 RX ADMIN — Medication 75 MILLIGRAM(S): at 17:26

## 2022-06-08 RX ADMIN — OXYCODONE HYDROCHLORIDE 10 MILLIGRAM(S): 5 TABLET ORAL at 02:26

## 2022-06-08 RX ADMIN — PANTOPRAZOLE SODIUM 40 MILLIGRAM(S): 20 TABLET, DELAYED RELEASE ORAL at 05:58

## 2022-06-08 RX ADMIN — Medication 100 MILLIGRAM(S): at 21:02

## 2022-06-08 RX ADMIN — Medication 100 MILLIGRAM(S): at 05:59

## 2022-06-08 RX ADMIN — ATORVASTATIN CALCIUM 10 MILLIGRAM(S): 80 TABLET, FILM COATED ORAL at 21:02

## 2022-06-08 RX ADMIN — OXYCODONE HYDROCHLORIDE 5 MILLIGRAM(S): 5 TABLET ORAL at 15:00

## 2022-06-08 RX ADMIN — OXYCODONE HYDROCHLORIDE 5 MILLIGRAM(S): 5 TABLET ORAL at 23:02

## 2022-06-08 RX ADMIN — Medication 75 MILLIGRAM(S): at 05:57

## 2022-06-08 RX ADMIN — Medication 100 MILLIGRAM(S): at 13:54

## 2022-06-08 RX ADMIN — POLYETHYLENE GLYCOL 3350 17 GRAM(S): 17 POWDER, FOR SOLUTION ORAL at 13:59

## 2022-06-08 RX ADMIN — OXYCODONE HYDROCHLORIDE 5 MILLIGRAM(S): 5 TABLET ORAL at 13:59

## 2022-06-08 RX ADMIN — Medication 240 MILLIGRAM(S): at 05:58

## 2022-06-08 NOTE — PROGRESS NOTE ADULT - SUBJECTIVE AND OBJECTIVE BOX
Keith was awake and alert and able to sit upright on the side of his bed as I fit him with an [place] TLSO with shoulder straps. He was instructed on donning and adjustment of the brace and given printed instructions and contact info. Brace was labeled and placed on chair at foot of bed for later use OOB.   [facility]

## 2022-06-08 NOTE — PROGRESS NOTE ADULT - SUBJECTIVE AND OBJECTIVE BOX
[facility]                                                INFECTIOUS DISEASES  =======================================================                               Chance Boggs MD#  Luis Manuel Anand MD*                                     Brielle De La Rosa MD*    Dania Villarreal MD*            Diplomates American Board of Internal Medicine & Infectious Diseases                  # [place] Office - Appt - Tel  [phone] Fax [phone]                * [place] Office - Appt - Tel [phone] Fax [phone]                                  Hospital Consult line:  [phone]  =======================================================    [id number]  DIANA JACKSONNTI  follow up:  MSSA infection, epidural abscess    no new issues  s/p OR debridement of the thoracic spine on 22; cultures with staph aureus       I have personally reviewed the labs and data; pertinent labs and data are listed in this note; please see below.   =======================================================  Past Medical & Surgical Hx:  =====================  PAST MEDICAL & SURGICAL HISTORY:    Problem List:  ==========  HEALTH ISSUES - PROBLEM Dx:  Preop cardiovascular exam  History of atrial fibrillation  HTN (hypertension)      Social Hx:  =======  no toxic habits currently    FAMILY HISTORY:  no significant family history of immunosuppressive disorders in mother or father   =======================================================    REVIEW OF SYSTEMS:  CONSTITUTIONAL:  No Fever or chills  HEENT:  No diplopia or blurred vision.  No earache, sore throat or runny nose.  CARDIOVASCULAR:  No pressure, squeezing, strangling, tightness, heaviness or aching about the chest, neck, axilla or epigastrium.  RESPIRATORY:  No cough, shortness of breath  GASTROINTESTINAL:  No nausea, vomiting or diarrhea.  GENITOURINARY:  No dysuria, frequency or urgency. No Blood in urine  MUSCULOSKELETAL:  BACK PAIN  SKIN:  No change in skin, hair or nails.  NEUROLOGIC:  No Headaches, seizures or weakness.  PSYCHIATRIC:  No disorder of thought or mood.  ENDOCRINE:  No heat or cold intolerance  HEMATOLOGICAL:  No easy bruising or bleeding.    =======================================================  Allergies  No Known Allergies     ======================================================  Physical Exam:  ============     General:  No acute distress.  Eye: Pupils are equal, round and reactive to light, Extraocular movements are intact, Normal conjunctiva.  HENT: Normocephalic, Oral mucosa is moist, No pharyngeal erythema, No sinus tenderness.  Neck: Supple, No lymphadenopathy.  Respiratory: Lungs are clear to auscultation, Respirations are non-labored.  Cardiovascular: Normal rate, Regular rhythm,   Gastrointestinal: Soft, Non-tender, Non-distended, Normal bowel sounds.  Genitourinary: No costovertebral angle tenderness.  Lymphatics: No lymphadenopathy neck,   Musculoskeletal:  mid thoracic spine with tenderness to palpation; + drain  Integumentary: No rash.  Neurologic: Alert, Oriented, No focal deficits, Cranial Nerves II-XII are grossly intact.  Psychiatric: Appropriate mood & affect.    =======================================================  Vitals:  ============  T(F): 98.5 (2022 05:08), Max: 99.5 (2022 17:27)  HR: 74 (2022 05:08)  BP: 102/64 (2022 05:08)  RR: 16 (2022 05:08)  SpO2: 96% (2022 05:08) (94% - 97%)  temp max in last 48H T(F): , Max: 99.5 (22 @ 17:27)    =======================================================  Current Antibiotics:  ceFAZolin   IVPB 2000 milliGRAM(s) IV Intermittent every 8 hours    Other medications:  atorvastatin 10 milliGRAM(s) Oral at bedtime  diltiazem    milliGRAM(s) Oral daily  losartan 25 milliGRAM(s) Oral daily  metoprolol tartrate 75 milliGRAM(s) Oral two times a day  pantoprazole    Tablet 40 milliGRAM(s) Oral before breakfast  polyethylene glycol 3350 17 Gram(s) Oral daily  senna 2 Tablet(s) Oral at bedtime      =======================================================  Labs:                        13.0   16.12 )-----------( 408      ( 2022 06:11 )             40.1     06-08    131<L>  |  96<L>  |  18.6  ----------------------------<  147<H>  4.8   |  27.0  |  1.16    Ca    8.8      2022 06:11  Mg     2.0     06-08    TPro  6.4<L>  /  Alb  2.8<L>  /  TBili  0.7  /  DBili  x   /  AST  15  /  ALT  13  /  AlkPhos  92  06-08      Culture - Acid Fast - Tissue w/Smear (collected 22 @ 03:39)  Source: .Tissue epidural phlegmon    Culture - Tissue with Gram Stain (collected 22 @ 02:26)  Source: .Tissue epidural phlegmon  Gram Stain (22 @ 03:24):    No polymorphonuclear leukocytes seen per low power field    No organisms seen per oil power field    Culture - Surgical Swab (collected 22 @ 02:15)  Source: .Surgical Swab thoracic epidural (swab)    Culture - Blood (collected 22 @ 02:06)  Source: .Blood Blood-Peripheral  Gram Stain (22 @ 09:08):    Growth in anaerobic bottle: Gram Positive Cocci in Clusters    ***Blood Panel PCR results on this specimen are available    approximately 3 hours after the Gram stain result.***    Gram stain, PCR, and/or culture results may not always    correspond due todifference in methodologies.    ************************************************************    This PCR assay was performed using [employer]    The following targets are tested for: Enterococcus,    vancomycin resistant enterococci, Listeria monocytogenes,    coagulase negative staphylococci, S. aureus,    methicillin resistant S. aureus, Streptococcus agalactiae    (Group B), S. pneumoniae, S. pyogenes (Group A),    Acinetobacter baumannii, Enterobacter cloacae, E. coli,    Klebsiella oxytoca, K. pneumoniae, Proteus sp.,    Serratia marcescens, Haemophilus influenzae,    Neisseria meningitidis, Pseudomonas aeruginosa, Candida    albicans, C. glabrata, C krusei, C parapsilosis,    C. tropicalis and the KPC resistance gene.    Gram Stain and BCID performed by:    [facility]    [address] [place] [address]    .    TYPE: (C=Critical, N=Notification, A=Abnormal) C    TESTS:  _ GS    DATE/TIME CALLED: _ 2022 09:07:29    CALLED TO: Johnie Up RN    READ BACK (2 Patient Identifiers)(Y/N): _ Y    READ BACK VALUES (Y/N): _ Y    CALLED BY: Johnie Gardner  Organism: Blood Culture PCR (22 @ 09:16)  Organism: Blood Culture PCR (22 @ 09:16)    Sensitivities:      -  Methicillin SENSITIVE Staphylococcus aureus (MSSA): Detec Any isolate of Staphylococcus aureus from a blood culture is NOT considered a contaminant.      Method Type: PCR    Culture - Blood (collected 22 @ 02:05)  Source: .Blood Blood-Peripheral        C-Reactive Protein, Serum: 23 mg/L (22 @ 02:05)    Sedimentation Rate, Erythrocyte: 24 mm/hr (22 @ 02:05)    Procalcitonin, Serum: 0.12 ng/mL (22 @ 02:05)    COVID-19 PCR: NotDetec (22 @ 17:07)      =======================================================       < from: MR Lumbar Spine No Cont (22 @ 10:45) >    ACC: [id number] EXAM:  MR SPINE LUMBAR                        ACC: [id number] EXAM:  MR SPINE THORACIC                          PROCEDURE DATE:  2022          INTERPRETATION:  CLINICAL INDICATIONS: epidural abscess, surgery planning    COMPARISON: MR thoracic spine dated 2022    TECHNIQUE: Thoracic lumbar spine MRI: T2 axial, T2 sagittal, T1 sagittal   noncontrast images of the thoracic and lumbar spine    FINDINGS:    Redemonstrated is abnormal signal at T7/T8 compatible to patient's known   discitis osteomyelitis. Left arm midline ventral flattening at the T7/T8   level causing mild rightward cord compression on image 24 of series 9 is   redemonstrated and measures 8.4 x 7.7 x 30 mm. Posterior epidural   collection at the T7, T8, [id number] best seen on image 8 of series 8   measuring 8.7 cm in its greatest CC dimension.    Abnormal signal changes at the L1/L2 level also concerning for   discitis/osteomyelitis.    Stable left para-aortic cyst measuring 3.8 x 3.2 x 5.5 cm. Bilateral   renal cyst.    Diffuse heterogeneous T1 marrow signal.    IMPRESSION: Grossly stable discitis/osteomyelitis at the T7/T8 level with ventral and dorsal epidural collections, as above. Stable discitis/osteomyelitis at L1/L2.    --- End of Report ---         DUSTY TANG MD; Attending Radiologist  This document has been electronically signed. 2022 11:47AM    < end of copied text >       ====================       AT chuy  COVID+ on 22  COVID negative on 22     ===============  [facility]                Patient:  DIANA GUTIERREZ    [address] [place] [address]                           MRN:[id number]    [place] [address]                        :1960   Sex: Male    Director:    Minor Farrell M.D.               FIN:[id number]  Ordering Physician:  Betty Sutherland             Location:  2SW; 0260; 01      Blood Cultures      PROCEDURE:                Blood Culture  [i1]      COLLECTED:                2022 06:30 EDT  SOURCE:                   Blood                    RECEIVED:                 2022 11:33 EDT  BODY SITE:                Arm R                    ACCESSION:                3--0016  FREE TEXT SOURCE:         [id number] R ARM           ORDERING PHYSICIAN:       Betty Sutherland    ***FINAL REPORT***  Final Report  []                                              Reported Date/Time: 2022 09:14 EDT  Staphylococcus aureus ( From Aerobic    ***PRELIMINARY REPORT***  Preliminary Report  []                                        Reported Date/Time: 2022 12:27 EDT  Presumptive Staphylococcus aureus (From Anaerobic Bottle) , identification and susceptibility to  follow.    Preliminary Report  []                                        Reported Date/Time: 2022 03:43 EDT  Culture in progress    ***STAINS / PREPARATIONS***  GS  []                                                        Reported Date/Time: 2022 09:08 EDT  Gram positive cocci in clusters (From Anaerobic Bottle)    Results called to Suellen Londono RN of 2SW. 2022 03:45:18, by AN. read back  Gram positive cocci in clusters (From Aerobic Bottle)  Result Previously Reported.  2022 09:08:44    ***SUSCEPTIBILITY RESULTS***  Staphylococcus aureus  Antibiotic                    JOVANI Dilutn              JOVANI Interp  Ciprofloxacin                 <=0.5                   Susceptible  Clindamycin                   0.25                    Resistant  Daptomycin                    0.5                     Susceptible  Erythromycin                  1                       Resistant  Gentamicin                    <=0.5                   Susceptible  Inducible Clindamycin Test    Positive                Positive  Levofloxacin                  <=0.12                  Susceptible  Linezolid                     2                       Susceptible  Oxacillin                     0.5                     Susceptible  Tetracycline                  <=1                     Susceptible  Trimethoprim/Sulfa            <=10                    Susceptible  Vancomycin                    1                       Susceptible    Interpretive Data  i1:   Blood Culture  Test performed at: [facility]   [place], [place] [address] [phone]

## 2022-06-08 NOTE — PROGRESS NOTE ADULT - SUBJECTIVE AND OBJECTIVE BOX
PA Student Note:  HPI:  HPI:  61M with PMHX HTN, DVT/PE, Testicular CA s/p chemo/radiation/LN Dissection admitted to [facility] for symptomatic COVID PNA (treated with steroids/remdesivir) and MSSA bacteremia (treated with Ancef). Subsequently c/o low back pain had MRI WO which noted diskitis/osteomyelitis and epidural abscess, and was transferred to [facility] for T6-9 laminectomy for epidural abscess drainage. Pt seen/examined today and discussed with RN, pt was cooperative with exam. Pt is voiding, stooling/passing gas. Denies HA, unsteadiness, weakness, dizziness, cough, SOB, fever/chills.    PMHX: HTN, DVT/PE, Testicular CA s/p chemo/radiation/LN Dissection  PSHX: Radical Orchiectomy, Hip Replacement, Hernia Repair  FamHx: +Mother CA +Mother DM2, +Father CAD  Social Hx: Former Smoker quit 20 years ago  NKDA (05 Jun 2022 02:52)    INTERVAL HPI/OVERNIGHT EVENTS:  61y Male s/p T6-9 laminectomy for epidural abscess evacuation POD2 seen lying comfortably in bed. Tolerating diet. Passing gas/BM. Voiding. Thoracic CRISTIAN output 115 in last 24 hours serosanguinous fluid. Denies headache, weakness, numbness, n/v/d, fevers, chills, chest pain, SOB.     Vital Signs Last 24 Hrs  T(C): 36.9 (08 Jun 2022 05:08), Max: 37.5 (07 Jun 2022 17:27)  T(F): 98.5 (08 Jun 2022 05:08), Max: 99.5 (07 Jun 2022 17:27)  HR: 74 (08 Jun 2022 05:08) (71 - 93)  BP: 102/64 (08 Jun 2022 05:08) (101/64 - 122/84)  BP(mean): 76 (07 Jun 2022 12:00) (76 - 76)  RR: 16 (08 Jun 2022 05:08) (16 - 18)  SpO2: 96% (08 Jun 2022 05:08) (94% - 97%)    PHYSICAL EXAM:  GENERAL: NAD, well-groomed  HEAD:  Atraumatic, normocephalic  DRAINS: Thoracic CRISTIAN drain dry, intact  WOUND: Primary dressing clean dry intact  DOMINICK COMA SCORE: 15  MENTAL STATUS: AAO x3; Awake; Opens eyes spontaneously; Appropriately conversant without aphasia; following simple commands  CRANIAL NERVES: Visual acuity/fields grossly intact, PERRL. EOMI without nystagmus. Hearing grossly intact. Speech clear.  REFLEXES: PERRL. Corneals intact b/l.  MOTOR: Strength 5/5 b/l upper and lower extremities  SENSATION: Grossly intact to light touch all extremities  COORDINATION: Gait intact  CHEST/LUNG: Clear to auscultation bilaterally; no rales, rhonchi, wheezing, or rubs  HEART: +S1/+S2; Regular rate and rhythm; no murmurs, rubs, or gallops  ABDOMEN: Soft, nontender, nondistended; bowel sounds present all four quadrants  EXTREMITIES:  No clubbing, cyanosis, or edema  SKIN: Warm, dry; no rashes or lesions    LABS:                        13.0   16.12 )-----------( 408      ( 08 Jun 2022 06:11 )             40.1     06-08    131<L>  |  96<L>  |  18.6  ----------------------------<  147<H>  4.8   |  27.0  |  1.16    Ca    8.8      08 Jun 2022 06:11  Mg     2.0     06-08    TPro  6.4<L>  /  Alb  2.8<L>  /  TBili  0.7  /  DBili  x   /  AST  15  /  ALT  13  /  AlkPhos  92  06-08          06-07 @ 07:01 - 06-08 @ 07:00  --------------------------------------------------------  IN: 0 mL / OUT: 1230 mL / NET: -1230 mL    06-08 @ 07:01  - 06-08 @ 10:55  --------------------------------------------------------  IN: 0 mL / OUT: 35 mL / NET: -35 mL        RADIOLOGY & ADDITIONAL TESTS:   PA Student Note:  HPI:  HPI:  61M with PMHX HTN, DVT/PE, Testicular CA s/p chemo/radiation/LN Dissection admitted to [facility] for symptomatic COVID PNA (treated with steroids/remdesivir) and MSSA bacteremia (treated with Ancef). Subsequently c/o low back pain had MRI WO which noted diskitis/osteomyelitis and epidural abscess, and was transferred to [facility] for T6-9 laminectomy for epidural abscess drainage. Pt seen/examined today and discussed with RN, pt was cooperative with exam. Pt is voiding, stooling/passing gas. Denies HA, unsteadiness, weakness, dizziness, cough, SOB, fever/chills.    PMHX: HTN, DVT/PE, Testicular CA s/p chemo/radiation/LN Dissection  PSHX: Radical Orchiectomy, Hip Replacement, Hernia Repair  FamHx: +Mother CA +Mother DM2, +Father CAD  Social Hx: Former Smoker quit 20 years ago  NKDA (05 Jun 2022 02:52)    INTERVAL HPI/OVERNIGHT EVENTS:  61y Male s/p T6-9 laminectomy for epidural abscess evacuation POD2 seen lying comfortably in bed. Tolerating diet. Passing gas/BM. Voiding. Thoracic CRISTIAN output 115 in last 24 hours serosanguinous fluid. Denies headache, weakness, numbness, n/v/d, fevers, chills, chest pain, SOB.     Vital Signs Last 24 Hrs  T(C): 36.9 (08 Jun 2022 05:08), Max: 37.5 (07 Jun 2022 17:27)  T(F): 98.5 (08 Jun 2022 05:08), Max: 99.5 (07 Jun 2022 17:27)  HR: 74 (08 Jun 2022 05:08) (71 - 93)  BP: 102/64 (08 Jun 2022 05:08) (101/64 - 122/84)  BP(mean): 76 (07 Jun 2022 12:00) (76 - 76)  RR: 16 (08 Jun 2022 05:08) (16 - 18)  SpO2: 96% (08 Jun 2022 05:08) (94% - 97%)    PHYSICAL EXAM:  GENERAL: NAD, well-groomed  HEAD:  Atraumatic, normocephalic  DRAINS: Thoracic CRISTIAN drain dry, intact  WOUND: Primary dressing clean dry intact  DOMINICK COMA SCORE: 15  MENTAL STATUS: AAO x3; Awake; Opens eyes spontaneously; Appropriately conversant without aphasia; following simple commands  CRANIAL NERVES: Visual acuity/fields grossly intact, PERRL. Hearing grossly intact. Speech clear.  REFLEXES: PERRL. Corneals intact b/l.  MOTOR: Strength 5/5 b/l upper and lower extremities  SENSATION: Grossly intact to light touch all extremities  COORDINATION: Gait intact  EXTREMITIES:  No clubbing, cyanosis, or edema  SKIN: Warm, dry; no rashes or lesions    LABS:                        13.0   16.12 )-----------( 408      ( 08 Jun 2022 06:11 )             40.1     06-08    131<L>  |  96<L>  |  18.6  ----------------------------<  147<H>  4.8   |  27.0  |  1.16    Ca    8.8      08 Jun 2022 06:11  Mg     2.0     06-08    TPro  6.4<L>  /  Alb  2.8<L>  /  TBili  0.7  /  DBili  x   /  AST  15  /  ALT  13  /  AlkPhos  92  06-08 06-07 @ 07:01 - 06-08 @ 07:00  --------------------------------------------------------  IN: 0 mL / OUT: 1230 mL / NET: -1230 mL    06-08 @ 07:01 - 06-08 @ 10:55  --------------------------------------------------------  IN: 0 mL / OUT: 35 mL / NET: -35 mL        RADIOLOGY & ADDITIONAL TESTS:

## 2022-06-08 NOTE — PROGRESS NOTE ADULT - ASSESSMENT
61M with PMHX HTN, DVT/PE, Testicular CA s/p chemo/radiation/LN Dissection c/o SOB/RICKETTS and cough admitted to [facility] for Acute Hypoxic Respiratory Failure 2/2 COVID PNA with hospital course c/b multiple electrolyte abnormalities and MSSA Bacteremia subsequently found to have Diskitis/Osteomyelitis and Epidural Abscess for which he was transferred to [facility] for IR and Neurosurgical evaluation/intervention. Pt underwent laminectomy with abscess drainage okn 06/06. He is slowly improving, receiving Abx, with ID and Neurosurgery following.       Diskitis/Osteomyelitis/Epidural Abscess r/o Cord Compression/Cauda Equina c/b MSSA bacteremia, POA  s/p Laminectomy post op day #2.   - ID and neurosurgery following   - iv abx as per ID  - pain regiment     #Acute Hypoxic Resp Failure 2/2 COVID PNA  -Vacccinated x2  -CTA Chest negative for PE  -Completed Remdesivir at [place]  - currently on ambietn air    HTN, HLD  -Cardizem 240mg PO q24  -Metoprolol Tartrate 75mg PO BID  -Losartan 25mg PO q24    Testicular CA s/p chemo/radiation/LN Dissection  -PETSCAN 6-7 mos ago negative for malignancy  -CT ABD findings likely known Lymphocele / postsurgical changes from prior LN dissection  -Outpatient F/u [facility]     dvt prop  - scd for now  code/social - full code  Dispo - will need 2-3 more days, f/u blood cx, will need prolong course of IV  steroids 61M with PMHX HTN, DVT/PE, Testicular CA s/p chemo/radiation/LN Dissection c/o SOB/RICKETTS and cough admitted to [facility] for Acute Hypoxic Respiratory Failure 2/2 COVID PNA with hospital course c/b multiple electrolyte abnormalities and MSSA Bacteremia subsequently found to have Diskitis/Osteomyelitis and Epidural Abscess for which he was transferred to [facility] for IR and Neurosurgical evaluation/intervention. Pt underwent laminectomy with abscess drainage okn 06/06. He is slowly improving, receiving Abx, with ID and Neurosurgery following.       Diskitis/Osteomyelitis/Epidural Abscess r/o Cord Compression/Cauda Equina c/b MSSA bacteremia, POA  s/p Laminectomy post op day #2.   - ID and neurosurgery following   - iv abx as per ID  - pain regiment     #Acute Hypoxic Resp Failure 2/2 COVID PNA  -Vacccinated x2  -CTA Chest negative for PE  -Completed Remdesivir at [place]  - currently on ambietn air    HTN, HLD  -Cardizem 240mg PO q24  -Metoprolol Tartrate 75mg PO BID  -Losartan 25mg PO q24    Testicular CA s/p chemo/radiation/LN Dissection  -PETSCAN 6-7 mos ago negative for malignancy  -CT ABD findings likely known Lymphocele / postsurgical changes from prior LN dissection  -Outpatient F/u [facility]     dvt prop  - scd for now  code/social - full code  Dispo - will need 2-3 more days, f/u blood cx, will need prolong course of IV  steroids

## 2022-06-08 NOTE — PROGRESS NOTE ADULT - ASSESSMENT
61M with PMHX HTN, DVT/PE, Testicular CA s/p chemo/radiation/LN Dissection admitted for T7-8 epidural abscess s/p T6-9 laminectomy for epidural abscess evacuation POD2. Patient condition improving, vitals signs stable.    Plan:    Neuro:  Q4 neuro checks, notify any radical changes in exam  Pain control as needed, avoid over sedation  Melatonin PRN for insomnia    CV:  Maintain normotension, goal -150  Cardizem 240mg daily, Losartan 25mg daily, Metoprolol 25mg BID  Lipitor 100 mg nightly    Resp:  goal O2 SAT > or = to 94%  RA  Encourage IS use    GI:  DASH diet  Zofran PRN for nausea  Protonix 40mg daily  Bowel regimen w/ senna and miralax    :   Continue daily I/Os  BMP, replace lytes PRN    Heme:  SCDs for DVT prophylaxis     Endo:  Maintain euglycemia, goal serum glucose 120 to 180     ID:   Afebrile, continue to monitor temperature  Continue Ancef 2g IV TID    Skin:  OOB as tolerated to maintain skin integrity, PT   Keep primary dressing in place until CRISTIAN removal 61M with PMHX HTN, DVT/PE, Testicular CA s/p chemo/radiation/LN Dissection admitted for T7-8 epidural abscess s/p T6-9 laminectomy for epidural abscess evacuation POD2. Patient condition improving, vitals signs stable. Continue observation.    Plan:    Neuro:  Q4 neuro checks, notify any radical changes in exam  Pain control as needed, avoid over sedation  Melatonin PRN for insomnia    CV:  Maintain normotension, goal -150  Cardizem 240mg daily, Losartan 25mg daily, Metoprolol 25mg BID  Lipitor 100 mg nightly    Resp:  goal O2 SAT > or = to 94%  RA  Encourage IS use    GI:  DASH diet  Zofran PRN for nausea  Protonix 40mg daily  Bowel regimen w/ senna and miralax    :   Continue daily I/Os  BMP, replace lytes PRN    Heme:  SCDs for DVT prophylaxis if not ambulating     Endo:  Maintain euglycemia, goal serum glucose 120 to 180     ID:   Afebrile, continue to monitor temperature. Consider removal of COVID-19 isolation status.  Continue Ancef 2g IV TID    Skin:  OOB as tolerated to maintain skin integrity, PT   Keep primary dressing in place until CRISTIAN removal

## 2022-06-08 NOTE — PROGRESS NOTE ADULT - ASSESSMENT
This 61M with HTN, DVT/PE, Testicular CA s/p chemo/radiation/LN Dissection c/o SOB/RICKETTS and cough admitted to [facility] for Acute Hypoxic Respiratory Failure 2/2 COVID PNA. Treated with steroids and remdesivir. Found to have MSSA bacteremia treated with Ancef. Subsequently c/o low back pain had MRI WO which noted diskitis/osteomyelitis and epidural abscess. Patient evaluated by Neurosurgery at [place] who recommended IR intervention and transfer to [facility]. Neurosurgery consulted. Pt seen/examined. Denies current complaints. Doing well. ROS negative. Neuro exam intact. Discussed with RN.     PMHX: HTN, DVT/PE, Testicular CA s/p chemo/radiation/LN Dissection  PSHX: Radical Orchiectomy, Hip Replacement, Hernia Repair  FamHx: +Mother CA +Mother DM2, +Father CAD  Social Hx: Former Smoker quit 20 years ago  NKDA (05 Jun 2022 02:52)    patient has been having back pain; recalls episode of severe back pain in a 4D movies experience of [place] in 10/2021. he went to see a spine Dr. In [place] Who dx him with arthritis of the back and treated with pain meds.   about 2 weeks ago, he developed a boil in his RIGHT groin. He denies squeezing at it, but stated that it went away as quick as it came.     Denies IVDU.   no other skin conditions to report.     MRI and CT scan from [facility] reviewed.   MSSA identified in blood cultures on 5/29/22 x 2 sets  but cleared on 5/31/22 x 2 sets, and 6/1/22 x 2 sets.       Impression:  WBC elevation  MSSA bacteremia  Spine Osteomyelitis  thoracic spine abscess  back pan      Plan:  - neurosurgical team  following  s/p T8-T9 laminectomy 6/6/22; 2 grams of vanco powder placed in the wound per note.   OR cultures in process    - continue Cefazolin 2 grams Q8H    blood cultures from 6/5/22 are positive here  repeat blood cx ordered for 6/7/22 x  2 sets.     PICC Line only after negative blood cx.   PICC Line because of at least 6 weeks of IV antibiotics.   midline is NOT an alternative.     WBC elevation is reactive  - will follow and trend    control of back pain per primary team      - follow up all outstanding cultures  - trend temperature and WBC curve  - repeat cultures from blood and all sources if febrile.      regarding COVID-19 testing  positive in [place], then negative prior to transfer, and negative here.   no evidence of Clinical COVID-19  will not treat  Isolation guidance as per infection control

## 2022-06-08 NOTE — PROGRESS NOTE ADULT - SUBJECTIVE AND OBJECTIVE BOX
[facility] Division of Hospital Medicine    Chief Complaint:  discitis with bacteremia    SUBJECTIVE: reports feeling better, improved back pain, denied SOB, CP, abdominal pain, N/V    OVERNIGHT EVENTS: none reported     Patient denies chest pain, SOB, abd pain, N/V, fever, chills, dysuria or any other complaints. All remainder ROS negative.     MEDICATIONS  (STANDING):  atorvastatin 10 milliGRAM(s) Oral at bedtime  ceFAZolin   IVPB 2000 milliGRAM(s) IV Intermittent every 8 hours  diltiazem    milliGRAM(s) Oral daily  losartan 25 milliGRAM(s) Oral daily  metoprolol tartrate 75 milliGRAM(s) Oral two times a day  pantoprazole    Tablet 40 milliGRAM(s) Oral before breakfast  polyethylene glycol 3350 17 Gram(s) Oral daily  senna 2 Tablet(s) Oral at bedtime    MEDICATIONS  (PRN):  acetaminophen     Tablet .. 650 milliGRAM(s) Oral every 6 hours PRN Temp greater or equal to 38C (100.4F), Mild Pain (1 - 3)  aluminum hydroxide/magnesium hydroxide/simethicone Suspension 30 milliLiter(s) Oral every 4 hours PRN Dyspepsia  melatonin 3 milliGRAM(s) Oral at bedtime PRN Insomnia  ondansetron Injectable 4 milliGRAM(s) IV Push every 8 hours PRN Nausea and/or Vomiting  oxyCODONE    IR 5 milliGRAM(s) Oral every 4 hours PRN Moderate Pain (4 - 6)  oxyCODONE    IR 10 milliGRAM(s) Oral every 4 hours PRN Severe Pain (7 - 10)        I&O's Summary    07 Jun 2022 07:01  -  08 Jun 2022 07:00  --------------------------------------------------------  IN: 0 mL / OUT: 1230 mL / NET: -1230 mL    08 Jun 2022 07:01  -  08 Jun 2022 13:23  --------------------------------------------------------  IN: 0 mL / OUT: 35 mL / NET: -35 mL        PHYSICAL EXAM:  Vital Signs Last 24 Hrs  T(C): 37 (08 Jun 2022 11:12), Max: 37.5 (07 Jun 2022 17:27)  T(F): 98.6 (08 Jun 2022 11:12), Max: 99.5 (07 Jun 2022 17:27)  HR: 79 (08 Jun 2022 11:12) (74 - 93)  BP: 106/60 (08 Jun 2022 11:12) (102/64 - 122/84)  BP(mean): --  RR: 18 (08 Jun 2022 11:12) (16 - 18)  SpO2: 97% (08 Jun 2022 11:12) (94% - 97%)        CONSTITUTIONAL: NAD, well-developed, well-groomed  ENMT: Moist oral mucosa, no pharyngeal injection or exudates; normal dentition; No JVD  RESPIRATORY: Normal respiratory effort; lungs are clear to auscultation bilaterally  CARDIOVASCULAR: Regular rate and rhythm, normal S1 and S2, no murmur/rub/gallop; No lower extremity edema; Peripheral pulses are 2+ bilaterally  ABDOMEN: Nontender to palpation, normoactive bowel sounds, no rebound/guarding; No hepatosplenomegaly  MUSCLOSKELETAL:  no clubbing or cyanosis of digits; no joint swelling or tenderness to palpation, p/o area wound vac in place, draining 55 cc of serosanguineous fluid.   PSYCH: A+O to person, place, and time; affect appropriate  NEUROLOGY: CN 2-12 are intact and symmetric; no gross sensory deficits; was observed moving all 4 ext against gravity cooperating with exam.   SKIN: No rashes; no palpable lesions    LABS:                        13.0   16.12 )-----------( 408      ( 08 Jun 2022 06:11 )             40.1     06-08    131<L>  |  96<L>  |  18.6  ----------------------------<  147<H>  4.8   |  27.0  |  1.16    Ca    8.8      08 Jun 2022 06:11  Mg     2.0     06-08    TPro  6.4<L>  /  Alb  2.8<L>  /  TBili  0.7  /  DBili  x   /  AST  15  /  ALT  13  /  AlkPhos  92  06-08              Culture - Acid Fast - Tissue w/Smear (collected 07 Jun 2022 03:39)  Source: .Tissue epidural phlegmon    Culture - Tissue with Gram Stain (collected 07 Jun 2022 02:26)  Source: .Tissue epidural phlegmon  Gram Stain (07 Jun 2022 03:24):    No polymorphonuclear leukocytes seen per low power field    No organisms seen per oil power field  Preliminary Report (07 Jun 2022 21:10):    No growth    Culture - Surgical Swab (collected 07 Jun 2022 02:15)  Source: .Surgical Swab thoracic epidural (swab)  Preliminary Report (08 Jun 2022 00:31):    Rare Staphylococcus species      CAPILLARY BLOOD GLUCOSE            RADIOLOGY & ADDITIONAL TESTS:  Results Reviewed:   Imaging Personally Reviewed:  Electrocardiogram Personally Reviewed:

## 2022-06-09 LAB
-  AMPICILLIN/SULBACTAM: SIGNIFICANT CHANGE UP
-  AMPICILLIN/SULBACTAM: SIGNIFICANT CHANGE UP
-  CEFAZOLIN: SIGNIFICANT CHANGE UP
-  CEFAZOLIN: SIGNIFICANT CHANGE UP
-  CLINDAMYCIN: SIGNIFICANT CHANGE UP
-  CLINDAMYCIN: SIGNIFICANT CHANGE UP
-  ERYTHROMYCIN: SIGNIFICANT CHANGE UP
-  ERYTHROMYCIN: SIGNIFICANT CHANGE UP
-  GENTAMICIN: SIGNIFICANT CHANGE UP
-  GENTAMICIN: SIGNIFICANT CHANGE UP
-  OXACILLIN: SIGNIFICANT CHANGE UP
-  OXACILLIN: SIGNIFICANT CHANGE UP
-  PENICILLIN: SIGNIFICANT CHANGE UP
-  PENICILLIN: SIGNIFICANT CHANGE UP
-  RIFAMPIN: SIGNIFICANT CHANGE UP
-  RIFAMPIN: SIGNIFICANT CHANGE UP
-  TETRACYCLINE: SIGNIFICANT CHANGE UP
-  TETRACYCLINE: SIGNIFICANT CHANGE UP
-  TRIMETHOPRIM/SULFAMETHOXAZOLE: SIGNIFICANT CHANGE UP
-  TRIMETHOPRIM/SULFAMETHOXAZOLE: SIGNIFICANT CHANGE UP
-  VANCOMYCIN: SIGNIFICANT CHANGE UP
-  VANCOMYCIN: SIGNIFICANT CHANGE UP
ANION GAP SERPL CALC-SCNC: 13 MMOL/L — SIGNIFICANT CHANGE UP (ref 5–17)
BUN SERPL-MCNC: 14.5 MG/DL — SIGNIFICANT CHANGE UP (ref 8–20)
CALCIUM SERPL-MCNC: 8.8 MG/DL — SIGNIFICANT CHANGE UP (ref 8.6–10.2)
CHLORIDE SERPL-SCNC: 93 MMOL/L — LOW (ref 98–107)
CO2 SERPL-SCNC: 26 MMOL/L — SIGNIFICANT CHANGE UP (ref 22–29)
CREAT SERPL-MCNC: 1.07 MG/DL — SIGNIFICANT CHANGE UP (ref 0.5–1.3)
CULTURE RESULTS: SIGNIFICANT CHANGE UP
CULTURE RESULTS: SIGNIFICANT CHANGE UP
EGFR: 78 ML/MIN/1.73M2 — SIGNIFICANT CHANGE UP
GLUCOSE SERPL-MCNC: 128 MG/DL — HIGH (ref 70–99)
HCT VFR BLD CALC: 40 % — SIGNIFICANT CHANGE UP (ref 39–50)
HGB BLD-MCNC: 12.9 G/DL — LOW (ref 13–17)
MCHC RBC-ENTMCNC: 29.7 PG — SIGNIFICANT CHANGE UP (ref 27–34)
MCHC RBC-ENTMCNC: 32.3 GM/DL — SIGNIFICANT CHANGE UP (ref 32–36)
MCV RBC AUTO: 92 FL — SIGNIFICANT CHANGE UP (ref 80–100)
METHOD TYPE: SIGNIFICANT CHANGE UP
METHOD TYPE: SIGNIFICANT CHANGE UP
ORGANISM # SPEC MICROSCOPIC CNT: SIGNIFICANT CHANGE UP
PLATELET # BLD AUTO: 429 K/UL — HIGH (ref 150–400)
POTASSIUM SERPL-MCNC: 4.2 MMOL/L — SIGNIFICANT CHANGE UP (ref 3.5–5.3)
POTASSIUM SERPL-SCNC: 4.2 MMOL/L — SIGNIFICANT CHANGE UP (ref 3.5–5.3)
RBC # BLD: 4.35 M/UL — SIGNIFICANT CHANGE UP (ref 4.2–5.8)
RBC # FLD: 13.1 % — SIGNIFICANT CHANGE UP (ref 10.3–14.5)
SODIUM SERPL-SCNC: 132 MMOL/L — LOW (ref 135–145)
SPECIMEN SOURCE: SIGNIFICANT CHANGE UP
SPECIMEN SOURCE: SIGNIFICANT CHANGE UP
WBC # BLD: 15.76 K/UL — HIGH (ref 3.8–10.5)
WBC # FLD AUTO: 15.76 K/UL — HIGH (ref 3.8–10.5)

## 2022-06-09 PROCEDURE — 99232 SBSQ HOSP IP/OBS MODERATE 35: CPT

## 2022-06-09 PROCEDURE — 99233 SBSQ HOSP IP/OBS HIGH 50: CPT

## 2022-06-09 RX ADMIN — OXYCODONE HYDROCHLORIDE 5 MILLIGRAM(S): 5 TABLET ORAL at 20:43

## 2022-06-09 RX ADMIN — OXYCODONE HYDROCHLORIDE 5 MILLIGRAM(S): 5 TABLET ORAL at 23:55

## 2022-06-09 RX ADMIN — OXYCODONE HYDROCHLORIDE 5 MILLIGRAM(S): 5 TABLET ORAL at 09:00

## 2022-06-09 RX ADMIN — Medication 240 MILLIGRAM(S): at 05:42

## 2022-06-09 RX ADMIN — OXYCODONE HYDROCHLORIDE 5 MILLIGRAM(S): 5 TABLET ORAL at 08:18

## 2022-06-09 RX ADMIN — OXYCODONE HYDROCHLORIDE 5 MILLIGRAM(S): 5 TABLET ORAL at 02:44

## 2022-06-09 RX ADMIN — LOSARTAN POTASSIUM 25 MILLIGRAM(S): 100 TABLET, FILM COATED ORAL at 05:42

## 2022-06-09 RX ADMIN — Medication 100 MILLIGRAM(S): at 21:57

## 2022-06-09 RX ADMIN — OXYCODONE HYDROCHLORIDE 5 MILLIGRAM(S): 5 TABLET ORAL at 14:45

## 2022-06-09 RX ADMIN — Medication 100 MILLIGRAM(S): at 05:42

## 2022-06-09 RX ADMIN — OXYCODONE HYDROCHLORIDE 5 MILLIGRAM(S): 5 TABLET ORAL at 13:53

## 2022-06-09 RX ADMIN — Medication 75 MILLIGRAM(S): at 05:42

## 2022-06-09 RX ADMIN — Medication 100 MILLIGRAM(S): at 13:31

## 2022-06-09 RX ADMIN — ATORVASTATIN CALCIUM 10 MILLIGRAM(S): 80 TABLET, FILM COATED ORAL at 21:57

## 2022-06-09 RX ADMIN — PANTOPRAZOLE SODIUM 40 MILLIGRAM(S): 20 TABLET, DELAYED RELEASE ORAL at 05:41

## 2022-06-09 NOTE — PHYSICAL THERAPY INITIAL EVALUATION ADULT - PERTINENT HX OF CURRENT PROBLEM, REHAB EVAL
61M with PMHX HTN, DVT/PE, Testicular CA s/p chemo/radiation/LN Dissection admitted for T7-8 epidural abscess s/p T6-9 laminectomy for epidural abscess evacuation

## 2022-06-09 NOTE — PROGRESS NOTE ADULT - ASSESSMENT
This 61M with HTN, DVT/PE, Testicular CA s/p chemo/radiation/LN Dissection c/o SOB/RICKETTS and cough admitted to [facility] for Acute Hypoxic Respiratory Failure 2/2 COVID PNA. Treated with steroids and remdesivir. Found to have MSSA bacteremia treated with Ancef. Subsequently c/o low back pain had MRI WO which noted diskitis/osteomyelitis and epidural abscess. Patient evaluated by Neurosurgery at [place] who recommended IR intervention and transfer to [facility]. Neurosurgery consulted. Pt seen/examined. Denies current complaints. Doing well. ROS negative. Neuro exam intact. Discussed with RN.     PMHX: HTN, DVT/PE, Testicular CA s/p chemo/radiation/LN Dissection  PSHX: Radical Orchiectomy, Hip Replacement, Hernia Repair  FamHx: +Mother CA +Mother DM2, +Father CAD  Social Hx: Former Smoker quit 20 years ago  NKDA (05 Jun 2022 02:52)    patient has been having back pain; recalls episode of severe back pain in a 4D movies experience of [place] in 10/2021. he went to see a spine Dr. In [place] Who dx him with arthritis of the back and treated with pain meds.   about 2 weeks ago, he developed a boil in his RIGHT groin. He denies squeezing at it, but stated that it went away as quick as it came.     Denies IVDU.   no other skin conditions to report.     MRI and CT scan from [facility] reviewed.   MSSA identified in blood cultures on 5/29/22 x 2 sets  but cleared on 5/31/22 x 2 sets, and 6/1/22 x 2 sets.       Impression:  WBC elevation  MSSA bacteremia  Spine Osteomyelitis  thoracic spine abscess  back pan      Plan:  - neurosurgical team  following  s/p T8-T9 laminectomy 6/6/22; 2 grams of vanco powder placed in the wound per note.   OR cultures in process    - continue Cefazolin 2 grams Q8H    blood cultures from 6/5/22 are positive here; repeat blood cx ordered for 6/7/22  is positive  repeat blood cultures sent on 6/9/22    ADDED Rifampin 300mg PO BID on 6/9/22      PICC Line only after negative blood cx.   PICC Line because of at least 6 weeks of IV antibiotics.   midline is NOT an alternative.     WBC elevation is reactive  - will follow and trend    control of back pain per primary team      - follow up all outstanding cultures  - trend temperature and WBC curve  - repeat cultures from blood and all sources if febrile.      regarding COVID-19 testing  positive in [place], then negative prior to transfer, and negative here.   no evidence of Clinical COVID-19  will not treat  Isolation guidance as per infection control

## 2022-06-09 NOTE — PHYSICAL THERAPY INITIAL EVALUATION ADULT - GENERAL OBSERVATIONS, REHAB EVAL
Patient received lying in bed, NAD, breathing RA, +CRISTIAN drain, +TLSO when OOB. Pt agreeable to Physical Therapy evaluation.

## 2022-06-09 NOTE — PROGRESS NOTE ADULT - SUBJECTIVE AND OBJECTIVE BOX
[facility]                                                INFECTIOUS DISEASES  =======================================================                               Chance Boggs MD#  Luis Manuel Anand MD*                                     Brielle De La Rosa MD*    Dania Villarreal MD*            Diplomates American Board of Internal Medicine & Infectious Diseases                  # [place] Office - Appt - Tel  [phone] Fax [phone]                * [place] Office - Appt - Tel [phone] Fax [phone]                                  Hospital Consult line:  [phone]  =======================================================    [id number]  DIANA JACKSONNTI  follow up:  MSSA infection, epidural abscess    blood cultures reviewed 22  still positive.   no new events.       I have personally reviewed the labs and data; pertinent labs and data are listed in this note; please see below.   =======================================================  Past Medical & Surgical Hx:  =====================  PAST MEDICAL & SURGICAL HISTORY:    Problem List:  ==========  HEALTH ISSUES - PROBLEM Dx:  Preop cardiovascular exam  History of atrial fibrillation  HTN (hypertension)      Social Hx:  =======  no toxic habits currently    FAMILY HISTORY:  no significant family history of immunosuppressive disorders in mother or father   =======================================================    REVIEW OF SYSTEMS:  CONSTITUTIONAL:  No Fever or chills  HEENT:  No diplopia or blurred vision.  No earache, sore throat or runny nose.  CARDIOVASCULAR:  No pressure, squeezing, strangling, tightness, heaviness or aching about the chest, neck, axilla or epigastrium.  RESPIRATORY:  No cough, shortness of breath  GASTROINTESTINAL:  No nausea, vomiting or diarrhea.  GENITOURINARY:  No dysuria, frequency or urgency. No Blood in urine  MUSCULOSKELETAL:  BACK PAIN  SKIN:  No change in skin, hair or nails.  NEUROLOGIC:  No Headaches, seizures or weakness.  PSYCHIATRIC:  No disorder of thought or mood.  ENDOCRINE:  No heat or cold intolerance  HEMATOLOGICAL:  No easy bruising or bleeding.    =======================================================  Allergies  No Known Allergies     ======================================================  Physical Exam:  ============     General:  No acute distress.  Eye: Pupils are equal, round and reactive to light, Extraocular movements are intact, Normal conjunctiva.  HENT: Normocephalic, Oral mucosa is moist, No pharyngeal erythema, No sinus tenderness.  Neck: Supple, No lymphadenopathy.  Respiratory: Lungs are clear to auscultation, Respirations are non-labored.  Cardiovascular: Normal rate, Regular rhythm,   Gastrointestinal: Soft, Non-tender, Non-distended, Normal bowel sounds.  Genitourinary: No costovertebral angle tenderness.  Lymphatics: No lymphadenopathy neck,   Musculoskeletal:  mid thoracic spine with tenderness to palpation; + drain  Integumentary: No rash.  Neurologic: Alert, Oriented, No focal deficits, Cranial Nerves II-XII are grossly intact.  Psychiatric: Appropriate mood & affect.    =======================================================     Vitals:  ============  T(F): 98.3 (2022 05:08), Max: 99.1 (2022 21:00)  HR: 82 (2022 05:08)  BP: 114/80 (2022 05:08)  RR: 16 (2022 05:08)  SpO2: 92% (2022 05:08) (92% - 97%)  temp max in last 48H T(F): , Max: 99.5 (22 @ 17:27)    =======================================================  Current Antibiotics:  ceFAZolin   IVPB 2000 milliGRAM(s) IV Intermittent every 8 hours  rifAMPin 300 milliGRAM(s) Oral two times a day    Other medications:  atorvastatin 10 milliGRAM(s) Oral at bedtime  diltiazem    milliGRAM(s) Oral daily  losartan 25 milliGRAM(s) Oral daily  metoprolol tartrate 75 milliGRAM(s) Oral two times a day  pantoprazole    Tablet 40 milliGRAM(s) Oral before breakfast  polyethylene glycol 3350 17 Gram(s) Oral daily  senna 2 Tablet(s) Oral at bedtime      =======================================================  Labs:                        12.9   15.76 )-----------( 429      ( 2022 07:10 )             40.0     06    132<L>  |  93<L>  |  14.5  ----------------------------<  128<H>  4.2   |  26.0  |  1.07    Ca    8.8      2022 07:10  Mg     2.0     06-08    TPro  6.4<L>  /  Alb  2.8<L>  /  TBili  0.7  /  DBili  x   /  AST  15  /  ALT  13  /  AlkPhos  92  -08      Culture - Blood (collected 22 @ 16:58)  Source: .Blood Blood-Peripheral    Culture - Blood (collected 22 @ 16:58)  Source: .Blood Blood-Peripheral  Gram Stain (22 @ 21:50):    Growth in aerobic bottle: Gram Positive Cocci in Clusters    Culture - Acid Fast - Tissue w/Smear (collected 22 @ 03:39)  Source: .Tissue epidural phlegmon    Culture - Tissue with Gram Stain (collected 22 @ 02:26)  Source: .Tissue epidural phlegmon  Gram Stain (22 @ 03:24):    No polymorphonuclear leukocytes seen per low power field    No organisms seen per oil power field    Culture - Surgical Swab (collected 22 @ 02:15)  Source: .Surgical Swab thoracic epidural (swab)  Organism: Staphylococcus aureus (22 @ 20:15)  Organism: Staphylococcus aureus (22 @ 20:15)    Sensitivities:      -  Ampicillin/Sulbactam: S <=8/4      -  Cefazolin: S <=4      -  Clindamycin: R <=0.25 This isolate is presumed to be clindamycin resistant based on detection of inducible resistance. Clindamycin may still be effective in some patients.      -  Erythromycin: R >4      -  Gentamicin: S <=1 Should not be used as monotherapy      -  Oxacillin: S 0.5 Oxacillin predicts susceptibility for dicloxacillin, methicillin, and nafcillin      -  Penicillin: R >8      -  Rifampin: S <=1 Should not be used as monotherapy      -  Tetra/Doxy: S <=1      -  Trimethoprim/Sulfamethoxazole: S <=0.5/9.5      -  Vancomycin: S 2      Method Type: JOVANI    Culture - Blood (collected 22 @ 02:06)  Source: .Blood Blood-Peripheral  Gram Stain (22 @ 09:08):    Growth in anaerobic bottle: Gram Positive Cocci in Clusters    ***Blood Panel PCR results on this specimen are available    approximately 3 hours after the Gram stain result.***    Gram stain, PCR, and/or culture results may not always    correspond due todifference in methodologies.    ************************************************************    This PCR assay was performed using [employer].    The following targets are tested for: Enterococcus,    vancomycin resistant enterococci, Listeria monocytogenes,    coagulase negative staphylococci, S. aureus,    methicillin resistant S. aureus, Streptococcus agalactiae    (Group B), S. pneumoniae, S. pyogenes (Group A),    Acinetobacter baumannii, Enterobacter cloacae, E. coli,    Klebsiella oxytoca, K. pneumoniae, Proteus sp.,    Serratia marcescens, Haemophilus influenzae,    Neisseria meningitidis, Pseudomonas aeruginosa, Candida    albicans, C. glabrata, C krusei, C parapsilosis,    C. tropicalis and the KPC resistance gene.    Gram Stain and BCID performed by:    [facility]    [address] [place] [address]    .    TYPE: (C=Critical, N=Notification, A=Abnormal) C    TESTS:  _ GS    DATE/TIME CALLED: _ 2022 09:07:29    CALLED TO: Johnie Up RN    READ BACK (2 Patient Identifiers)(Y/N): _ Y    READ BACK VALUES (Y/N): _ Y    CALLED BY: Johnie Gardner  Final Report (22 @ 08:52):    Growth in anaerobic bottle: Staphylococcus aureus  Organism: Blood Culture PCR  Gram Positive Cocci in Clusters (22 @ 08:52)  Organism: Gram Positive Cocci in Clusters (22 @ 08:52)    Sensitivities:      -  Ampicillin/Sulbactam: S <=8/4      -  Cefazolin: S <=4      -  Clindamycin: R <=0.25 This isolate is presumed to be clindamycin resistant based on detection of inducible resistance. Clindamycin may still be effective in some patients.      -  Erythromycin: R >4      -  Gentamicin: S <=1 Should not be used as monotherapy      -  Oxacillin: S 0.5 Oxacillin predicts susceptibility for dicloxacillin, methicillin, and nafcillin      -  Penicillin: R >8      -  Rifampin: S <=1 Should not be used as monotherapy      -  Tetra/Doxy: S <=1      -  Trimethoprim/Sulfamethoxazole: S <=0.5/9.5      -  Vancomycin: S 1      Method Type: JOVANI  Organism: Blood Culture PCR (22 @ 08:52)    Sensitivities:      -  Methicillin SENSITIVE Staphylococcus aureus (MSSA): Detec Any isolate of Staphylococcus aureus from a blood culture is NOT considered a contaminant.      Method Type: PCR    Culture - Blood (collected 22 @ 02:05)  Source: .Blood Blood-Peripheral        C-Reactive Protein, Serum: 23 mg/L (22 @ 02:05)  Sedimentation Rate, Erythrocyte: 24 mm/hr (22 @ 02:05)  Procalcitonin, Serum: 0.12 ng/mL (22 @ 02:05)  COVID-19 PCR: NotDetec (22 @ 17:07)      =======================================================         < from: MR Lumbar Spine No Cont (22 @ 10:45) >    ACC: [id number] EXAM:  MR SPINE LUMBAR                        ACC: [id number] EXAM:  MR SPINE THORACIC                          PROCEDURE DATE:  2022          INTERPRETATION:  CLINICAL INDICATIONS: epidural abscess, surgery planning    COMPARISON: MR thoracic spine dated 2022    TECHNIQUE: Thoracic lumbar spine MRI: T2 axial, T2 sagittal, T1 sagittal   noncontrast images of the thoracic and lumbar spine    FINDINGS:    Redemonstrated is abnormal signal at T7/T8 compatible to patient's known   discitis osteomyelitis. Left arm midline ventral flattening at the T7/T8   level causing mild rightward cord compression on image 24 of series 9 is   redemonstrated and measures 8.4 x 7.7 x 30 mm. Posterior epidural   collection at the T7, T8, H7grzdre best seen on image 8 of series 8   measuring 8.7 cm in its greatest CC dimension.    Abnormal signal changes at the L1/L2 level also concerning for   discitis/osteomyelitis.    Stable left para-aortic cyst measuring 3.8 x 3.2 x 5.5 cm. Bilateral   renal cyst.    Diffuse heterogeneous T1 marrow signal.    IMPRESSION: Grossly stable discitis/osteomyelitis at the T7/T8 level with ventral and dorsal epidural collections, as above. Stable discitis/osteomyelitis at L1/L2.    --- End of Report ---         DUSTY TANG MD; Attending Radiologist  This document has been electronically signed. 2022 11:47AM    < end of copied text >       ====================       AT chuy  COVID+ on 22  COVID negative on 22     ===============  [facility]                Patient:  DIANA GUTIERREZ    [address] [place] [address] Road                           MRN:[id number]    [place] [address]                        :1960   Sex: Male    Director:    Minor Farrell M.D.               FIN:[id number]  Ordering Physician:  Betty Sutherland             Location:  2SW; 0260; 01      Blood Cultures      PROCEDURE:                Blood Culture  [i1]      COLLECTED:                2022 06:30 EDT  SOURCE:                   Blood                    RECEIVED:                 2022 11:33 EDT  BODY SITE:                Arm R                    ACCESSION:                5--[id number]  FREE TEXT SOURCE:         [id number]           ORDERING PHYSICIAN:       Betty Sutherland    ***FINAL REPORT***  Final Report  []                                              Reported Date/Time: 2022 09:14 EDT  Staphylococcus aureus ( From Aerobic    ***PRELIMINARY REPORT***  Preliminary Report  []                                        Reported Date/Time: 2022 12:27 EDT  Presumptive Staphylococcus aureus (From Anaerobic Bottle) , identification and susceptibility to  follow.    Preliminary Report  []                                        Reported Date/Time: 2022 03:43 EDT  Culture in progress    ***STAINS / PREPARATIONS***  GS  []                                                        Reported Date/Time: 2022 09:08 EDT  Gram positive cocci in clusters (From Anaerobic Bottle)    Results called to Suellen Londono RN of 2SW. 2022 03:45:18, by AN. read back  Gram positive cocci in clusters (From Aerobic Bottle)  Result Previously Reported.  2022 09:08:44    ***SUSCEPTIBILITY RESULTS***  Staphylococcus aureus  Antibiotic                    JOVANI Dilutn              JOVANI Interp  Ciprofloxacin                 <=0.5                   Susceptible  Clindamycin                   0.25                    Resistant  Daptomycin                    0.5                     Susceptible  Erythromycin                  1                       Resistant  Gentamicin                    <=0.5                   Susceptible  Inducible Clindamycin Test    Positive                Positive  Levofloxacin                  <=0.12                  Susceptible  Linezolid                     2                       Susceptible  Oxacillin                     0.5                     Susceptible  Tetracycline                  <=1                     Susceptible  Trimethoprim/Sulfa            <=10                    Susceptible  Vancomycin                    1                       Susceptible    Interpretive Data  i1:   Blood Culture  Test performed at: [facility]   [place] [address] [phone]

## 2022-06-09 NOTE — PHYSICAL THERAPY INITIAL EVALUATION ADULT - ADDITIONAL COMMENTS
Patient lives in a private house with 2 ANEL and no stairs inside. He lives with his significant other who is able to assist if needed. He was independent prior to admission without AD, no DME noted. Drives.

## 2022-06-09 NOTE — PROGRESS NOTE ADULT - ASSESSMENT
61M with PMHX HTN, DVT/PE, Testicular CA s/p chemo/radiation/LN Dissection c/o SOB/RICKETTS and cough admitted to [facility] for Acute Hypoxic Respiratory Failure 2/2 COVID PNA with hospital course c/b multiple electrolyte abnormalities and MSSA Bacteremia subsequently found to have Diskitis/Osteomyelitis and Epidural Abscess for which he was transferred to [facility] for IR and Neurosurgical evaluation/intervention. Pt underwent laminectomy with abscess drainage okn 06/06. He is slowly improving, receiving Abx, with ID and Neurosurgery following.       Diskitis/Osteomyelitis/Epidural Abscess r/o Cord Compression/Cauda Equina c/b MSSA bacteremia, still with + blood cx  -s/p Laminectomy post op day #3.  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

## 2022-06-09 NOTE — PROGRESS NOTE ADULT - ASSESSMENT
61M with PMHX HTN, DVT/PE, Testicular CA s/p chemo/radiation/LN Dissection admitted for T7-8 epidural abscess s/p T6-9 laminectomy for epidural abscess evacuation POD3. Patient condition improving, vitals signs stable, pain controlled. Continue observation.    Plan:    Neuro:  Q4 neuro checks, notify any radical changes in exam  Pain control as needed, avoid over sedation  Melatonin PRN for insomnia    CV:  Maintain normotension, goal -150  Cardizem 240mg PO daily, Losartan PO 25mg daily, Metoprolol 75mg BID  Lipitor 10 mg nightly    Resp:  goal O2 SAT > or = to 94%  RA, IS use encouraged    GI:  DASH diet  Zofran PRN for nausea  Protonix 40mg daily  Bowel regimen w/ senna and miralax    :   Continue daily I/Os  BMP, replace lytes PRN    Heme:  SCDs for DVT prophylaxis if not ambulating     Endo:  Maintain euglycemia, goal serum glucose 120 to 180     ID:   Afebrile, continue to monitor temperature.  Continue Ancef 2g IV TID, Rifampin 300mg PO BID per ID recommendation    Skin:  OOB as tolerated to maintain skin integrity, PT   Keep primary dressing in place until CRISTIAN removal

## 2022-06-09 NOTE — PROGRESS NOTE ADULT - SUBJECTIVE AND OBJECTIVE BOX
[facility] Division of Hospital Medicine    Chief Complaint:  discitis with bacteremia    SUBJECTIVE: reports feeling better, no new complains    OVERNIGHT EVENTS: none reported     Patient denies chest pain, SOB, abd pain, N/V, fever, chills, dysuria or any other complaints. All remainder ROS negative.     MEDICATIONS  (STANDING):  atorvastatin 10 milliGRAM(s) Oral at bedtime  ceFAZolin   IVPB 2000 milliGRAM(s) IV Intermittent every 8 hours  diltiazem    milliGRAM(s) Oral daily  losartan 25 milliGRAM(s) Oral daily  metoprolol tartrate 75 milliGRAM(s) Oral two times a day  pantoprazole    Tablet 40 milliGRAM(s) Oral before breakfast  polyethylene glycol 3350 17 Gram(s) Oral daily  rifAMPin 300 milliGRAM(s) Oral two times a day  senna 2 Tablet(s) Oral at bedtime    MEDICATIONS  (PRN):  acetaminophen     Tablet .. 650 milliGRAM(s) Oral every 6 hours PRN Temp greater or equal to 38C (100.4F), Mild Pain (1 - 3)  aluminum hydroxide/magnesium hydroxide/simethicone Suspension 30 milliLiter(s) Oral every 4 hours PRN Dyspepsia  melatonin 3 milliGRAM(s) Oral at bedtime PRN Insomnia  ondansetron Injectable 4 milliGRAM(s) IV Push every 8 hours PRN Nausea and/or Vomiting  oxyCODONE    IR 5 milliGRAM(s) Oral every 4 hours PRN Moderate Pain (4 - 6)  oxyCODONE    IR 10 milliGRAM(s) Oral every 4 hours PRN Severe Pain (7 - 10)        I&O's Summary    07 Jun 2022 07:01  -  08 Jun 2022 07:00  --------------------------------------------------------  IN: 0 mL / OUT: 1230 mL / NET: -1230 mL    08 Jun 2022 07:01  -  08 Jun 2022 13:23  --------------------------------------------------------  IN: 0 mL / OUT: 35 mL / NET: -35 mL        PHYSICAL EXAM:  Vital Signs Last 24 Hrs  T(C): 36.9 (09 Jun 2022 11:14), Max: 37.3 (08 Jun 2022 21:00)  T(F): 98.5 (09 Jun 2022 11:14), Max: 99.1 (08 Jun 2022 21:00)  HR: 78 (09 Jun 2022 11:14) (78 - 94)  BP: 125/80 (09 Jun 2022 11:14) (114/80 - 133/87)  BP(mean): --  RR: 18 (09 Jun 2022 11:14) (16 - 18)  SpO2: 97% (09 Jun 2022 11:14) (92% - 97%)        CONSTITUTIONAL: NAD, well-developed, well-groomed  ENMT: Moist oral mucosa, no pharyngeal injection or exudates; normal dentition; No JVD  RESPIRATORY: Normal respiratory effort; lungs are clear to auscultation bilaterally  CARDIOVASCULAR: Regular rate and rhythm, normal S1 and S2, no murmur/rub/gallop; No lower extremity edema; Peripheral pulses are 2+ bilaterally  ABDOMEN: Nontender to palpation, normoactive bowel sounds, no rebound/guarding; No hepatosplenomegaly  MUSCLOSKELETAL:  no clubbing or cyanosis of digits; no joint swelling or tenderness to palpation, p/o area wound vac in place, draining minimal serosanguineous fluid.   PSYCH: A+O to person, place, and time; affect appropriate  NEUROLOGY: CN 2-12 are intact and symmetric; no gross sensory deficits; was observed moving all 4 ext against gravity cooperating with exam.   SKIN: No rashes; no palpable lesions    LABS:                        12.9   15.76 )-----------( 429      ( 09 Jun 2022 07:10 )             40.0     06-09    132<L>  |  93<L>  |  14.5  ----------------------------<  128<H>  4.2   |  26.0  |  1.07    Ca    8.8      09 Jun 2022 07:10  Mg     2.0     06-08    TPro  6.4<L>  /  Alb  2.8<L>  /  TBili  0.7  /  DBili  x   /  AST  15  /  ALT  13  /  AlkPhos  92  06-08              Culture - Blood (collected 07 Jun 2022 16:58)  Source: .Blood Blood-Peripheral  Preliminary Report (08 Jun 2022 17:01):    No growth to date.    Culture - Blood (collected 07 Jun 2022 16:58)  Source: .Blood Blood-Peripheral  Gram Stain (08 Jun 2022 21:50):    Growth in aerobic bottle: Gram Positive Cocci in Clusters  Preliminary Report (08 Jun 2022 21:50):    Growth in aerobic bottle: Gram Positive Cocci in Clusters    Culture - Acid Fast - Tissue w/Smear (collected 07 Jun 2022 03:39)  Source: .Tissue epidural phlegmon  Preliminary Report (08 Jun 2022 15:04):    Culture is being performed.    Culture - Tissue with Gram Stain (collected 07 Jun 2022 02:26)  Source: .Tissue epidural phlegmon  Gram Stain (07 Jun 2022 03:24):    No polymorphonuclear leukocytes seen per low power field    No organisms seen per oil power field  Preliminary Report (09 Jun 2022 00:26):    Rare Staphylococcus aureus    Culture - Surgical Swab (collected 07 Jun 2022 02:15)  Source: .Surgical Swab thoracic epidural (swab)  Preliminary Report (08 Jun 2022 20:16):    Rare Staphylococcus aureus  Organism: Staphylococcus aureus (08 Jun 2022 20:15)  Organism: Staphylococcus aureus (08 Jun 2022 20:15)      CAPILLARY BLOOD GLUCOSE            RADIOLOGY & ADDITIONAL TESTS:  Results Reviewed:   Imaging Personally Reviewed:  Electrocardiogram Personally Reviewed:

## 2022-06-09 NOTE — PROGRESS NOTE ADULT - SUBJECTIVE AND OBJECTIVE BOX
HPI:  HPI:  61M with PMHx testicular CA (5 years s/o chemo/radiation/LN dissection), HTN, DVT/PE admitted to [place] for symptomatic COVID, started on abx, then developed severe back pain x 4 days with MRI showing epidural collection and transferred to [facility].  Pt seen/examined. Reports standing up from bed, will start OOB to chair per PT.  Denies unsteady gait, dizziness, balance issues, numbness/tingling, fever, chills, n/v, SOB, chest pain.  Neuro exam intact.    PMHX: HTN, DVT/PE, Testicular CA s/p chemo/radiation/LN Dissection  PSHX: Radical Orchiectomy, Hip Replacement, Hernia Repair  FamHx: +Mother CA +Mother DM2, +Father CAD  Social Hx: Former Smoker quit 20 years ago  NKDA (05 Jun 2022 02:52)      INTERVAL HPI/OVERNIGHT EVENTS:  61y Male s/p T 6-9 laminectomy for epidural abscess evacuation POD3. Pt seen lying comfortably in bed. Pt is voiding and passing gas. Last BM 6/6. Tolerating diet. Pain well controlled. Denies unsteady gait, dizziness, balance issues, numbness/tingling, fever, chills, n/v, SOB, chest pain.  Thoracic CRISTIAN drain output 25cc past 12 hours/60cc past 24 hours serosanguinous fluid.    Vital Signs Last 24 Hrs  T(C): 36.8 (09 Jun 2022 05:08), Max: 37.3 (08 Jun 2022 21:00)  T(F): 98.3 (09 Jun 2022 05:08), Max: 99.1 (08 Jun 2022 21:00)  HR: 82 (09 Jun 2022 05:08) (79 - 94)  BP: 114/80 (09 Jun 2022 05:08) (106/60 - 133/87)  BP(mean): --  RR: 16 (09 Jun 2022 05:08) (16 - 18)  SpO2: 92% (09 Jun 2022 05:08) (92% - 97%)    PHYSICAL EXAM:  GENERAL: NAD, well-groomed  HEAD:  Atraumatic, normocephalic  DRAINS: Thoracic CRISTIAN in tact, output serosanguinous fluid  WOUND: Primary dressing appropriately soiled serosanguinous drainage  DOMINICK COMA SCORE: 15  MENTAL STATUS: AAO x3; Awake/Comatose; Opens eyes spontaneously; Appropriately conversant without aphasia  REFLEXES: PERRL. Corneals intact b/l.  MOTOR: Delt, bicep, tricep strength 5/5 b/l. Hip flexion, knee extension, dorsiflexion, plantar flexion strength 5/5 b/l.  SENSATION: Grossly intact to light touch all extremities and entire torso.  COORDINATION: Gait and one leg balance intact.  EXTREMITIES:  No clubbing, cyanosis, edema, calf tenderness b/l  SKIN: Warm, dry; no rashes or lesions    LABS:                        12.9   15.76 )-----------( 429      ( 09 Jun 2022 07:10 )             40.0     06-09    132<L>  |  93<L>  |  14.5  ----------------------------<  128<H>  4.2   |  26.0  |  1.07    Ca    8.8      09 Jun 2022 07:10  Mg     2.0     06-08    TPro  6.4<L>  /  Alb  2.8<L>  /  TBili  0.7  /  DBili  x   /  AST  15  /  ALT  13  /  AlkPhos  92  06-08 06-08 @ 07:01  -  06-09 @ 07:00  --------------------------------------------------------  IN: 0 mL / OUT: 1260 mL / NET: -1260 mL        RADIOLOGY & ADDITIONAL TESTS:   HPI:  HPI:  61M with PMHx testicular CA (5 years s/o chemo/radiation/LN dissection), HTN, DVT/PE admitted to [place] for symptomatic COVID, started on abx, then developed severe back pain x 4 days with MRI showing epidural collection and transferred to [facility].  Pt seen/examined. Reports standing up from bed with little to no difficulty, will start OOB to chair per PT.  Denies unsteady gait, dizziness, balance issues, numbness/tingling, fever, chills, n/v, SOB, chest pain.  Neuro exam intact.    PMHX: HTN, DVT/PE, Testicular CA s/p chemo/radiation/LN Dissection  PSHX: Radical Orchiectomy, Hip Replacement, Hernia Repair  FamHx: +Mother CA +Mother DM2, +Father CAD  Social Hx: Former Smoker quit 20 years ago  NKDA (05 Jun 2022 02:52)      INTERVAL HPI/OVERNIGHT EVENTS:  61y Male s/p T 6-9 laminectomy for epidural abscess evacuation POD3. Pt seen lying comfortably in bed. Pt is voiding and passing gas. Last BM 6/6. Tolerating diet. Pain well controlled. Denies unsteady gait, dizziness, balance issues, numbness/tingling, fever, chills, n/v, SOB, chest pain.  Thoracic CRISTIAN drain output 25cc past 12 hours/60cc past 24 hours serosanguinous fluid.    Vital Signs Last 24 Hrs  T(C): 36.8 (09 Jun 2022 05:08), Max: 37.3 (08 Jun 2022 21:00)  T(F): 98.3 (09 Jun 2022 05:08), Max: 99.1 (08 Jun 2022 21:00)  HR: 82 (09 Jun 2022 05:08) (79 - 94)  BP: 114/80 (09 Jun 2022 05:08) (106/60 - 133/87)  BP(mean): --  RR: 16 (09 Jun 2022 05:08) (16 - 18)  SpO2: 92% (09 Jun 2022 05:08) (92% - 97%)    PHYSICAL EXAM:  GENERAL: NAD, well-groomed  HEAD:  Atraumatic, normocephalic  DRAINS: Thoracic CRISTIAN in tact, output serosanguinous fluid  WOUND: Primary dressing appropriately soiled serosanguinous drainage  DOMINICK COMA SCORE: 15  MENTAL STATUS: AAO x3; Awake/Comatose; Opens eyes spontaneously; Appropriately conversant without aphasia  REFLEXES: PERRL. Corneals intact b/l.  MOTOR: Delt, bicep, tricep, hand , finger abduction strength 5/5 b/l. Hip flexion, knee extension, dorsiflexion, plantar flexion strength 5/5 b/l.  SENSATION: Grossly intact to light touch all extremities and entire torso.  COORDINATION: Gait and one leg balance intact.  EXTREMITIES:  No clubbing, cyanosis, edema, calf tenderness b/l  SKIN: Warm, dry; no rashes or lesions    LABS:                        12.9   15.76 )-----------( 429      ( 09 Jun 2022 07:10 )             40.0     06-09    132<L>  |  93<L>  |  14.5  ----------------------------<  128<H>  4.2   |  26.0  |  1.07    Ca    8.8      09 Jun 2022 07:10  Mg     2.0     06-08    TPro  6.4<L>  /  Alb  2.8<L>  /  TBili  0.7  /  DBili  x   /  AST  15  /  ALT  13  /  AlkPhos  92  06-08 06-08 @ 07:01  -  06-09 @ 07:00  --------------------------------------------------------  IN: 0 mL / OUT: 1260 mL / NET: -1260 mL        RADIOLOGY & ADDITIONAL TESTS:   HPI:  61M with PMHx testicular CA (5 years s/o chemo/radiation/LN dissection), HTN, DVT/PE admitted to [place] for symptomatic COVID, started on abx, then developed severe back pain x 4 days with MRI showing epidural collection and transferred to [facility].  Pt seen/examined. Reports standing up from bed with little to no difficulty, will start OOB to chair per PT.  Denies unsteady gait, dizziness, balance issues, numbness/tingling, fever, chills, n/v, SOB, chest pain.  Neuro exam intact.    PMHX: HTN, DVT/PE, Testicular CA s/p chemo/radiation/LN Dissection  PSHX: Radical Orchiectomy, Hip Replacement, Hernia Repair  FamHx: +Mother CA +Mother DM2, +Father CAD  Social Hx: Former Smoker quit 20 years ago  NKDA (05 Jun 2022 02:52)      INTERVAL HPI/OVERNIGHT EVENTS:  61y Male s/p T 6-9 laminectomy for epidural abscess evacuation POD3. Pt seen lying comfortably in bed. Pt is voiding and passing gas. Last BM 6/6. Tolerating diet. Pain well controlled. Denies unsteady gait, dizziness, balance issues, numbness/tingling, fever, chills, n/v, SOB, chest pain.  Thoracic CRISTIAN drain output 25cc past 12 hours/60cc past 24 hours serosanguinous fluid.    Vital Signs Last 24 Hrs  T(C): 36.8 (09 Jun 2022 05:08), Max: 37.3 (08 Jun 2022 21:00)  T(F): 98.3 (09 Jun 2022 05:08), Max: 99.1 (08 Jun 2022 21:00)  HR: 82 (09 Jun 2022 05:08) (79 - 94)  BP: 114/80 (09 Jun 2022 05:08) (106/60 - 133/87)  BP(mean): --  RR: 16 (09 Jun 2022 05:08) (16 - 18)  SpO2: 92% (09 Jun 2022 05:08) (92% - 97%)    PHYSICAL EXAM:  GENERAL: NAD, well-groomed  HEAD:  Atraumatic, normocephalic  DRAINS: Thoracic CRISTIAN in tact, output serosanguinous fluid  WOUND: Primary dressing appropriately soiled serosanguinous drainage  DOMINICK COMA SCORE: 15  MENTAL STATUS: AAO x3; Awake/Comatose; Opens eyes spontaneously; Appropriately conversant without aphasia  REFLEXES: PERRL. Corneals intact b/l.  MOTOR: Delt, bicep, tricep, hand , finger abduction strength 5/5 b/l. Hip flexion, knee extension, dorsiflexion, plantar flexion strength 5/5 b/l.  SENSATION: Grossly intact to light touch all extremities and entire torso.  COORDINATION: Gait and one leg balance intact.  EXTREMITIES:  No clubbing, cyanosis, edema, calf tenderness b/l  SKIN: Warm, dry; no rashes or lesions    LABS:                        12.9   15.76 )-----------( 429      ( 09 Jun 2022 07:10 )             40.0     06-09    132<L>  |  93<L>  |  14.5  ----------------------------<  128<H>  4.2   |  26.0  |  1.07    Ca    8.8      09 Jun 2022 07:10  Mg     2.0     06-08    TPro  6.4<L>  /  Alb  2.8<L>  /  TBili  0.7  /  DBili  x   /  AST  15  /  ALT  13  /  AlkPhos  92  06-08 06-08 @ 07:01  -  06-09 @ 07:00  --------------------------------------------------------  IN: 0 mL / OUT: 1260 mL / NET: -1260 mL

## 2022-06-09 NOTE — PROVIDER CONTACT NOTE (MEDICATION) - ACTION/TREATMENT ORDERED:
per PA delay the morning metoprolol until 0800 but administer the losartan and the cardizem
LON Perez pushed metoprolol till 2100 , recheck VS and reevaluate

## 2022-06-10 LAB
ANION GAP SERPL CALC-SCNC: 12 MMOL/L — SIGNIFICANT CHANGE UP (ref 5–17)
BUN SERPL-MCNC: 14.6 MG/DL — SIGNIFICANT CHANGE UP (ref 8–20)
CALCIUM SERPL-MCNC: 9.3 MG/DL — SIGNIFICANT CHANGE UP (ref 8.6–10.2)
CHLORIDE SERPL-SCNC: 95 MMOL/L — LOW (ref 98–107)
CO2 SERPL-SCNC: 27 MMOL/L — SIGNIFICANT CHANGE UP (ref 22–29)
CREAT SERPL-MCNC: 1.11 MG/DL — SIGNIFICANT CHANGE UP (ref 0.5–1.3)
CULTURE RESULTS: SIGNIFICANT CHANGE UP
EGFR: 75 ML/MIN/1.73M2 — SIGNIFICANT CHANGE UP
GLUCOSE SERPL-MCNC: 136 MG/DL — HIGH (ref 70–99)
HCT VFR BLD CALC: 38.6 % — LOW (ref 39–50)
HGB BLD-MCNC: 12.4 G/DL — LOW (ref 13–17)
MCHC RBC-ENTMCNC: 29.6 PG — SIGNIFICANT CHANGE UP (ref 27–34)
MCHC RBC-ENTMCNC: 32.1 GM/DL — SIGNIFICANT CHANGE UP (ref 32–36)
MCV RBC AUTO: 92.1 FL — SIGNIFICANT CHANGE UP (ref 80–100)
PLATELET # BLD AUTO: 436 K/UL — HIGH (ref 150–400)
POTASSIUM SERPL-MCNC: 3.4 MMOL/L — LOW (ref 3.5–5.3)
POTASSIUM SERPL-SCNC: 3.4 MMOL/L — LOW (ref 3.5–5.3)
RBC # BLD: 4.19 M/UL — LOW (ref 4.2–5.8)
RBC # FLD: 13.2 % — SIGNIFICANT CHANGE UP (ref 10.3–14.5)
SODIUM SERPL-SCNC: 134 MMOL/L — LOW (ref 135–145)
SPECIMEN SOURCE: SIGNIFICANT CHANGE UP
WBC # BLD: 12.3 K/UL — HIGH (ref 3.8–10.5)
WBC # FLD AUTO: 12.3 K/UL — HIGH (ref 3.8–10.5)

## 2022-06-10 PROCEDURE — 99233 SBSQ HOSP IP/OBS HIGH 50: CPT

## 2022-06-10 PROCEDURE — 99232 SBSQ HOSP IP/OBS MODERATE 35: CPT

## 2022-06-10 RX ORDER — POTASSIUM CHLORIDE 20 MEQ
40 PACKET (EA) ORAL ONCE
Refills: 0 | Status: COMPLETED | OUTPATIENT
Start: 2022-06-10 | End: 2022-06-10

## 2022-06-10 RX ADMIN — Medication 240 MILLIGRAM(S): at 05:50

## 2022-06-10 RX ADMIN — OXYCODONE HYDROCHLORIDE 5 MILLIGRAM(S): 5 TABLET ORAL at 08:25

## 2022-06-10 RX ADMIN — Medication 40 MILLIEQUIVALENT(S): at 13:12

## 2022-06-10 RX ADMIN — Medication 100 MILLIGRAM(S): at 13:11

## 2022-06-10 RX ADMIN — Medication 100 MILLIGRAM(S): at 05:51

## 2022-06-10 RX ADMIN — POLYETHYLENE GLYCOL 3350 17 GRAM(S): 17 POWDER, FOR SOLUTION ORAL at 13:12

## 2022-06-10 RX ADMIN — OXYCODONE HYDROCHLORIDE 5 MILLIGRAM(S): 5 TABLET ORAL at 13:12

## 2022-06-10 RX ADMIN — PANTOPRAZOLE SODIUM 40 MILLIGRAM(S): 20 TABLET, DELAYED RELEASE ORAL at 05:50

## 2022-06-10 RX ADMIN — ATORVASTATIN CALCIUM 10 MILLIGRAM(S): 80 TABLET, FILM COATED ORAL at 21:54

## 2022-06-10 RX ADMIN — OXYCODONE HYDROCHLORIDE 5 MILLIGRAM(S): 5 TABLET ORAL at 02:34

## 2022-06-10 RX ADMIN — Medication 75 MILLIGRAM(S): at 18:30

## 2022-06-10 RX ADMIN — OXYCODONE HYDROCHLORIDE 5 MILLIGRAM(S): 5 TABLET ORAL at 23:09

## 2022-06-10 RX ADMIN — Medication 75 MILLIGRAM(S): at 05:50

## 2022-06-10 RX ADMIN — OXYCODONE HYDROCHLORIDE 5 MILLIGRAM(S): 5 TABLET ORAL at 18:29

## 2022-06-10 RX ADMIN — OXYCODONE HYDROCHLORIDE 5 MILLIGRAM(S): 5 TABLET ORAL at 23:40

## 2022-06-10 RX ADMIN — Medication 100 MILLIGRAM(S): at 21:55

## 2022-06-10 RX ADMIN — LOSARTAN POTASSIUM 25 MILLIGRAM(S): 100 TABLET, FILM COATED ORAL at 05:50

## 2022-06-10 NOTE — PROGRESS NOTE ADULT - SUBJECTIVE AND OBJECTIVE BOX
[facility]                                                INFECTIOUS DISEASES  =======================================================                               Chance Boggs MD#  Luis Manuel Anand MD*                                     Brielle De La Rosa MD*    Dania Villarreal MD*            Diplomates American Board of Internal Medicine & Infectious Diseases                  # [place] Office - Appt - Tel  [phone] Fax [phone]                * [place] Office - Appt - Tel [phone] Fax [phone]                                  Hospital Consult line:  [phone]  =======================================================    [id number]  DIANA JACKSONNTI  follow up:  MSSA infection, epidural abscess    blood cultures reviewed 22; 1 of 2 sets positive.   repeat cx in process.   no new issues         I have personally reviewed the labs and data; pertinent labs and data are listed in this note; please see below.   =======================================================  Past Medical & Surgical Hx:  =====================  PAST MEDICAL & SURGICAL HISTORY:    Problem List:  ==========  HEALTH ISSUES - PROBLEM Dx:  Preop cardiovascular exam  History of atrial fibrillation  HTN (hypertension)      Social Hx:  =======  no toxic habits currently    FAMILY HISTORY:  no significant family history of immunosuppressive disorders in mother or father   =======================================================    REVIEW OF SYSTEMS:  CONSTITUTIONAL:  No Fever or chills  HEENT:  No diplopia or blurred vision.  No earache, sore throat or runny nose.  CARDIOVASCULAR:  No pressure, squeezing, strangling, tightness, heaviness or aching about the chest, neck, axilla or epigastrium.  RESPIRATORY:  No cough, shortness of breath  GASTROINTESTINAL:  No nausea, vomiting or diarrhea.  GENITOURINARY:  No dysuria, frequency or urgency. No Blood in urine  MUSCULOSKELETAL:  BACK PAIN  SKIN:  No change in skin, hair or nails.  NEUROLOGIC:  No Headaches, seizures or weakness.  PSYCHIATRIC:  No disorder of thought or mood.  ENDOCRINE:  No heat or cold intolerance  HEMATOLOGICAL:  No easy bruising or bleeding.    =======================================================  Allergies  No Known Allergies     ======================================================  Physical Exam:  ============     General:  No acute distress.  Eye: Pupils are equal, round and reactive to light, Extraocular movements are intact, Normal conjunctiva.  HENT: Normocephalic, Oral mucosa is moist, No pharyngeal erythema, No sinus tenderness.  Neck: Supple, No lymphadenopathy.  Respiratory: Lungs are clear to auscultation, Respirations are non-labored.  Cardiovascular: Normal rate, Regular rhythm,   Gastrointestinal: Soft, Non-tender, Non-distended, Normal bowel sounds.  Genitourinary: No costovertebral angle tenderness.  Lymphatics: No lymphadenopathy neck,   Musculoskeletal:  mid thoracic spine with tenderness to palpation; + drain in place    Integumentary: No rash.  Neurologic: Alert, Oriented, No focal deficits, Cranial Nerves II-XII are grossly intact.  Psychiatric: Appropriate mood & affect.    =======================================================   Vitals:  ============  T(F): 97.8 (10 Gabriel 2022 11:34), Max: 98.6 (10 Gabriel 2022 05:02)  HR: 80 (10 Gabriel 2022 11:34)  BP: 96/64 (10 Gabriel 2022 11:34)  RR: 19 (10 Gabriel 2022 11:34)  SpO2: 96% (10 Gabriel 2022 11:34) (91% - 99%)  temp max in last 48H T(F): , Max: 99.1 (22 @ 21:00)    =======================================================  Current Antibiotics:  ceFAZolin   IVPB 2000 milliGRAM(s) IV Intermittent every 8 hours  rifAMPin 300 milliGRAM(s) Oral two times a day    Other medications:  atorvastatin 10 milliGRAM(s) Oral at bedtime  diltiazem    milliGRAM(s) Oral daily  losartan 25 milliGRAM(s) Oral daily  metoprolol tartrate 75 milliGRAM(s) Oral two times a day  pantoprazole    Tablet 40 milliGRAM(s) Oral before breakfast  polyethylene glycol 3350 17 Gram(s) Oral daily  senna 2 Tablet(s) Oral at bedtime      =======================================================  Labs:                        12.4   12.30 )-----------( 436      ( 10 Gabriel 2022 07:07 )             38.6     06-10    134<L>  |  95<L>  |  14.6  ----------------------------<  136<H>  3.4<L>   |  27.0  |  1.11    Ca    9.3      10 Gabriel 2022 07:07        Culture - Blood (collected 22 @ 16:58)  Source: .Blood Blood-Peripheral    Culture - Blood (collected 22 @ 16:58)  Source: .Blood Blood-Peripheral  Gram Stain (22 @ 21:50):    Growth in aerobic bottle: Gram Positive Cocci in Clusters  Final Report (22 @ 16:51):    Growth in aerobic bottle: Staphylococcus aureus    See previous culture [id number]    Culture - Acid Fast - Tissue w/Smear (collected 22 @ 03:39)  Source: .Tissue epidural phlegmon    Culture - Tissue with Gram Stain (collected 22 @ 02:26)  Source: .Tissue epidural phlegmon  Gram Stain (22 @ 03:24):    No polymorphonuclear leukocytes seen per low power field    No organisms seen per oil power field  Organism: Staphylococcus aureus (22 @ 15:42)  Organism: Staphylococcus aureus (22 @ 15:42)    Sensitivities:      -  Ampicillin/Sulbactam: S <=8/4      -  Cefazolin: S <=4      -  Clindamycin: R 0.5 This isolate is presumed to be clindamycin resistant based on detection of inducible resistance. Clindamycin may still be effective in some patients.      -  Erythromycin: R >4      -  Gentamicin: S <=1 Should not be used as monotherapy      -  Oxacillin: S 1 Oxacillin predicts susceptibility for dicloxacillin, methicillin, and nafcillin      -  Penicillin: R >8      -  Rifampin: S <=1 Should not be used as monotherapy      -  Tetra/Doxy: S <=1      -  Trimethoprim/Sulfamethoxazole: S <=0.5/9.5      -  Vancomycin: S 2      Method Type: JOVANI    Culture - Surgical Swab (collected 22 @ 02:15)  Source: .Surgical Swab thoracic epidural (swab)  Organism: Staphylococcus aureus (22 @ 20:15)  Organism: Staphylococcus aureus (22 @ 20:15)    Sensitivities:      -  Ampicillin/Sulbactam: S <=8/4      -  Cefazolin: S <=4      -  Clindamycin: R <=0.25 This isolate is presumed to be clindamycin resistant based on detection of inducible resistance. Clindamycin may still be effective in some patients.      -  Erythromycin: R >4      -  Gentamicin: S <=1 Should not be used as monotherapy      -  Oxacillin: S 0.5 Oxacillin predicts susceptibility for dicloxacillin, methicillin, and nafcillin      -  Penicillin: R >8      -  Rifampin: S <=1 Should not be used as monotherapy      -  Tetra/Doxy: S <=1      -  Trimethoprim/Sulfamethoxazole: S <=0.5/9.5      -  Vancomycin: S 2      Method Type: JOVANI    Culture - Blood (collected 22 @ 02:06)  Source: .Blood Blood-Peripheral  Gram Stain (22 @ 09:08):    Growth in anaerobic bottle: Gram Positive Cocci in Clusters    ***Blood Panel PCR results on this specimen are available    approximately 3 hours after the Gram stain result.***    Gram stain, PCR, and/or culture results may not always    correspond due todifference in methodologies.    ************************************************************    This PCR assay was performed using [employer].    The following targets are tested for: Enterococcus,    vancomycin resistant enterococci, Listeria monocytogenes,    coagulase negative staphylococci, S. aureus,    methicillin resistant S. aureus, Streptococcus agalactiae    (Group B), S. pneumoniae, S. pyogenes (Group A),    Acinetobacter baumannii, Enterobacter cloacae, E. coli,    Klebsiella oxytoca, K. pneumoniae, Proteus sp.,    Serratia marcescens, Haemophilus influenzae,    Neisseria meningitidis, Pseudomonas aeruginosa, Candida    albicans, C. glabrata, C krusei, C parapsilosis,    C. tropicalis and the KPC resistance gene.    Gram Stain and BCID performed by:    [facility]    [address] [place] [address]    .    TYPE: (C=Critical, N=Notification, A=Abnormal) C    TESTS:  _ GS    DATE/TIME CALLED: _ 2022 09:07:29    CALLED TO: Johnie Up RN    READ BACK (2 Patient Identifiers)(Y/N): _ Y    READ BACK VALUES (Y/N): _ Y    CALLED BY: Johnie Gardner  Final Report (22 @ 08:52):    Growth in anaerobic bottle: Staphylococcus aureus  Organism: Blood Culture PCR  Staphylococcus aureus (22 @ 16:50)  Organism: Staphylococcus aureus (22 @ 16:50)    Sensitivities:      -  Ampicillin/Sulbactam: S <=8/4      -  Cefazolin: S <=4      -  Clindamycin: R <=0.25 This isolate is presumed to be clindamycin resistant based on detection of inducible resistance. Clindamycin may still be effective in some patients.      -  Erythromycin: R >4      -  Gentamicin: S <=1 Should not be used as monotherapy      -  Oxacillin: S 0.5 Oxacillin predicts susceptibility for dicloxacillin, methicillin, and nafcillin      -  Penicillin: R >8      -  Rifampin: S <=1 Should not be used as monotherapy      -  Tetra/Doxy: S <=1      -  Trimethoprim/Sulfamethoxazole: S <=0.5/9.5      -  Vancomycin: S 1      Method Type: JOVANI  Organism: Blood Culture PCR (22 @ 08:52)    Sensitivities:      -  Methicillin SENSITIVE Staphylococcus aureus (MSSA): Detec Any isolate of Staphylococcus aureus from a blood culture is NOT considered a contaminant.      Method Type: PCR    Culture - Blood (collected 22 @ 02:05)  Source: .Blood Blood-Peripheral  Final Report (06-10-22 @ 03:00):    No growth at 5 days.        C-Reactive Protein, Serum: 23 mg/L (22 @ 02:05)    Sedimentation Rate, Erythrocyte: 24 mm/hr (22 @ 02:05)    Procalcitonin, Serum: 0.12 ng/mL (22 @ 02:05)    COVID-19 PCR: NotDetec (22 @ 17:07)      =======================================================       < from: MR Lumbar Spine No Cont (22 @ 10:45) >    ACC: [id number] EXAM:  MR SPINE LUMBAR                        ACC: [id number] EXAM:  MR SPINE THORACIC                          PROCEDURE DATE:  2022          INTERPRETATION:  CLINICAL INDICATIONS: epidural abscess, surgery planning    COMPARISON: MR thoracic spine dated 2022    TECHNIQUE: Thoracic lumbar spine MRI: T2 axial, T2 sagittal, T1 sagittal   noncontrast images of the thoracic and lumbar spine    FINDINGS:    Redemonstrated is abnormal signal at T7/T8 compatible to patient's known   discitis osteomyelitis. Left arm midline ventral flattening at the T7/T8   level causing mild rightward cord compression on image 24 of series 9 is   redemonstrated and measures 8.4 x 7.7 x 30 mm. Posterior epidural   collection at the T7, T8, [id number] best seen on image 8 of series 8   measuring 8.7 cm in its greatest CC dimension.    Abnormal signal changes at the L1/L2 level also concerning for   discitis/osteomyelitis.    Stable left para-aortic cyst measuring 3.8 x 3.2 x 5.5 cm. Bilateral   renal cyst.    Diffuse heterogeneous T1 marrow signal.    IMPRESSION: Grossly stable discitis/osteomyelitis at the T7/T8 level with ventral and dorsal epidural collections, as above. Stable discitis/osteomyelitis at L1/L2.    --- End of Report ---         DUSTY TANG MD; Attending Radiologist  This document has been electronically signed. 2022 11:47AM    < end of copied text >       ====================       AT [place]  COVID+ on 22  COVID negative on 22     ===============  [facility]                Patient:  DIANA GUTIERREZ    [address] [place] [address]                           MRN:[id number]    [place] [address]                        :1960   Sex: Male    Director:    Minor Farrell M.D.               FIN:[id number]  Ordering Physician:  Betty Sutherland             Location:  2[facility]; 0260; 01      Blood Cultures      PROCEDURE:                Blood Culture  [i1]      COLLECTED:                2022 06:30 EDT  SOURCE:                   Blood                    RECEIVED:                 2022 11:33 EDT  BODY SITE:                Arm R                    ACCESSION:                3--0016  FREE TEXT SOURCE:         [id number] ARM           ORDERING PHYSICIAN:       Betty Sutherland    ***FINAL REPORT***  Final Report  []                                              Reported Date/Time: 2022 09:14 EDT  Staphylococcus aureus ( From Aerobic    ***PRELIMINARY REPORT***  Preliminary Report  []                                        Reported Date/Time: 2022 12:27 EDT  Presumptive Staphylococcus aureus (From Anaerobic Bottle) , identification and susceptibility to  follow.    Preliminary Report  []                                        Reported Date/Time: 2022 03:43 EDT  Culture in progress    ***STAINS / PREPARATIONS***  GS  []                                                        Reported Date/Time: 2022 09:08 EDT  Gram positive cocci in clusters (From Anaerobic Bottle)    Results called to Suellen Londono RN of 2SW. 2022 03:45:18, by AN. read back  Gram positive cocci in clusters (From Aerobic Bottle)  Result Previously Reported.  2022 09:08:44    ***SUSCEPTIBILITY RESULTS***  Staphylococcus aureus  Antibiotic                    JOVANI Dilutn              JOVANI Interp  Ciprofloxacin                 <=0.5                   Susceptible  Clindamycin                   0.25                    Resistant  Daptomycin                    0.5                     Susceptible  Erythromycin                  1                       Resistant  Gentamicin                    <=0.5                   Susceptible  Inducible Clindamycin Test    Positive                Positive  Levofloxacin                  <=0.12                  Susceptible  Linezolid                     2                       Susceptible  Oxacillin                     0.5                     Susceptible  Tetracycline                  <=1                     Susceptible  Trimethoprim/Sulfa            <=10                    Susceptible  Vancomycin                    1                       Susceptible    Interpretive Data  i1:   Blood Culture  Test performed at: [facility]  [address], [place] [address]         [phone]

## 2022-06-10 NOTE — CHART NOTE - NSCHARTNOTEFT_GEN_A_CORE
61M with HTN, DVT/PE, Testicular CA s/p chemo/radiation/LN Dissection c/o SOB/RICKETTS transferred from [facility] with MSSA bacteremia with MRI imaging notable for Diskitis/ OM with an epidural  abscess. Transferred emergently for Neurosx intervention.    Currently pt asymptomatic, denies any complaints. Not agreeable to bedrest (wants to go to bathroom with assistance). No focal neuro deficits appreciated on exam. Cardiology consulted for preop cardiac clearance.    Change IVF to NS. Repeat in am, if improves (> 130) pt will be medically optimized for drainage
CRISTIAN drain removed today. Pt tolerated procedure well. No acute complications. Site dressed w/ clean dry dressing.
Unable to see patient due to being at mri and OR all day. Will assess in am

## 2022-06-10 NOTE — PROGRESS NOTE ADULT - SUBJECTIVE AND OBJECTIVE BOX
[facility] Division of Hospital Medicine    Chief Complaint:  discitis with bacteremia    SUBJECTIVE: reports feeling better, back pain has improved, no new complains, minimizing his fluids intake not to go to bathroom too often, encourage to resume regular fluid intake, provided with urinary.     OVERNIGHT EVENTS: borderline hypotensive over night     Patient denies chest pain, SOB, abd pain, N/V, fever, chills, dysuria or any other complaints. All remainder ROS negative.     MEDICATIONS  (PRN):  acetaminophen     Tablet .. 650 milliGRAM(s) Oral every 6 hours PRN Temp greater or equal to 38C (100.4F), Mild Pain (1 - 3)  aluminum hydroxide/magnesium hydroxide/simethicone Suspension 30 milliLiter(s) Oral every 4 hours PRN Dyspepsia  melatonin 3 milliGRAM(s) Oral at bedtime PRN Insomnia  ondansetron Injectable 4 milliGRAM(s) IV Push every 8 hours PRN Nausea and/or Vomiting  oxyCODONE    IR 5 milliGRAM(s) Oral every 4 hours PRN Moderate Pain (4 - 6)  oxyCODONE    IR 10 milliGRAM(s) Oral every 4 hours PRN Severe Pain (7 - 10)          I&O's Summary    07 Jun 2022 07:01  -  08 Jun 2022 07:00  --------------------------------------------------------  IN: 0 mL / OUT: 1230 mL / NET: -1230 mL    08 Jun 2022 07:01  -  08 Jun 2022 13:23  --------------------------------------------------------  IN: 0 mL / OUT: 35 mL / NET: -35 mL        PHYSICAL EXAM:  Vital Signs Last 24 Hrs  T(C): 36.6 (10 Gabriel 2022 15:55), Max: 37 (10 Gabriel 2022 05:02)  T(F): 97.8 (10 Gabriel 2022 15:55), Max: 98.6 (10 Gabriel 2022 05:02)  HR: 89 (10 Gabriel 2022 15:55) (80 - 95)  BP: 96/68 (10 Gabriel 2022 15:55) (96/64 - 111/75)  BP(mean): --  RR: 18 (10 Gabriel 2022 15:55) (18 - 19)  SpO2: 98% (10 Gabriel 2022 15:55) (91% - 98%)        CONSTITUTIONAL: NAD, well-developed, well-groomed  ENMT: Moist oral mucosa, no pharyngeal injection or exudates; normal dentition; No JVD  RESPIRATORY: Normal respiratory effort; lungs are clear to auscultation bilaterally  CARDIOVASCULAR: Regular rate and rhythm, normal S1 and S2, no murmur/rub/gallop; No lower extremity edema; Peripheral pulses are 2+ bilaterally  ABDOMEN: Nontender to palpation, normoactive bowel sounds, no rebound/guarding; No hepatosplenomegaly  MUSCLOSKELETAL:  no clubbing or cyanosis of digits; no joint swelling or tenderness to palpation, p/o area wound vac in place, draining minimal serosanguineous fluid.   PSYCH: A+O to person, place, and time; affect appropriate  NEUROLOGY: CN 2-12 are intact and symmetric; no gross sensory deficits; was observed moving all 4 ext against gravity cooperating with exam.   SKIN: No rashes; no palpable lesions    LABS:                        12.4   12.30 )-----------( 436      ( 10 Gabriel 2022 07:07 )             38.6     06-10    134<L>  |  95<L>  |  14.6  ----------------------------<  136<H>  3.4<L>   |  27.0  |  1.11    Ca    9.3      10 Gabriel 2022 07:07                Culture - Blood (collected 09 Jun 2022 15:08)  Source: .Blood Blood-Peripheral  Preliminary Report (10 Gabriel 2022 16:01):    No growth to date.    Culture - Blood (collected 09 Jun 2022 15:08)  Source: .Blood Blood-Peripheral  Preliminary Report (10 Gabriel 2022 16:01):    No growth to date.      CAPILLARY BLOOD GLUCOSE            RADIOLOGY & ADDITIONAL TESTS:  Results Reviewed:   Imaging Personally Reviewed:  Electrocardiogram Personally Reviewed:

## 2022-06-10 NOTE — PROGRESS NOTE ADULT - ASSESSMENT
61M with PMHX HTN, DVT/PE, Testicular CA s/p chemo/radiation/LN Dissection c/o SOB/RICKETTS and cough admitted to [facility] for Acute Hypoxic Respiratory Failure 2/2 COVID PNA with hospital course c/b multiple electrolyte abnormalities and MSSA Bacteremia subsequently found to have Diskitis/Osteomyelitis and Epidural Abscess for which he was transferred to [facility] for IR and Neurosurgical evaluation/intervention. Pt underwent laminectomy with abscess drainage okn 06/06. He is slowly improving, receiving Abx, with ID and Neurosurgery following.       Diskitis/Osteomyelitis/Epidural Abscess r/o Cord Compression/Cauda Equina c/b MSSA bacteremia, still with + blood cx  -s/p Laminectomy post op day #3.  - Cx from 06/07 +, following on  Cx 06/09 and 06/10   - ID and neurosurgery following   - iv abx as per ID,  - pain regiment     #Acute Hypoxic Resp Failure 2/2 COVID PNA  -Vacccinated x2  -CTA Chest negative for PE  -Completed Remdesivir at [facility]  - currently on ambietn air    HTN, HLD  -Cardizem 240mg PO q24  -Metoprolol Tartrate 75mg PO BID  -Losartan 25mg PO q24    Testicular CA s/p chemo/radiation/LN Dissection  -PETSCAN 6-7 mos ago negative for malignancy  -CT ABD findings likely known Lymphocele / postsurgical changes from prior LN dissection  -Outpatient F/u [facility]     dvt prop  - scd for now  code/social - full code, updates family on his own  Dispo - will stay over weekend, plan to d/c home with home care and iv Abx most likely

## 2022-06-10 NOTE — PROGRESS NOTE ADULT - ASSESSMENT
60 Y/O male s/p T6-9 multilevel thoracolumbar laminectomy for evacuation of epidural abscess POD#4.      -pain control as needed, avoid over sedation  -maintain normotension, systolic  to 150  -SCDs for DVT prophylaxis   -further plan pending

## 2022-06-10 NOTE — PROGRESS NOTE ADULT - SUBJECTIVE AND OBJECTIVE BOX
INTERVAL HPI/OVERNIGHT EVENTS:  62 Y/O male w/ Hx of HTN, DVT/PE, Testicular CA s/p chemo/radiation, transferred to [facility] from [facility] where he was treated for hypoxic respiratory failure 2/2 COVID, found to have MSSA bacteremia, c/o back pain, had imaging which showed T6-10 phlegmon and epidural abscess T7-8. Pt now s/p T6-9 multilevel thoracolumbar laminectomy for evacuation of epidural abscess.   CRISTIAN out 10cc in 24hrs      Vital Signs Last 24 Hrs  T(C): 37 (10 Gabriel 2022 05:02), Max: 37 (10 Gabriel 2022 05:02)  T(F): 98.6 (10 Gabriel 2022 05:02), Max: 98.6 (10 Gabriel 2022 05:02)  HR: 90 (10 Gabriel 2022 05:02) (69 - 95)  BP: 107/72 (10 Gabriel 2022 05:02) (107/72 - 125/80)  BP(mean): --  RR: 18 (10 Gabriel 2022 05:02) (18 - 18)  SpO2: 91% (10 Gabriel 2022 05:02) (91% - 99%)      PHYSICAL EXAM:  GENERAL: NAD, well-groomed, well-developed  HEAD:  Atraumatic, normocephalic  DRAINS:   WOUND: Dressing clean dry intact  MENTAL STATUS: AAO x3; Awake/Comatose; Opens eyes spontaneously/to voice/to light touch/to noxious stimuli; Appropriately conversant without aphasia/Nonverbal; following simple commands/mimicking/not following commands  CRANIAL NERVES: PERRL; EOMI; corneals intact b/l; Dolls sign positive; blinks to threat b/l; no facial asymmetry; facial sensation grossly intact to light touch b/l;  tongue midline; palate rises symmetrically; gag reflex intact  MOTOR: strength 5/5 B/L upper and lower extremities; sensation grossly intact all extremities; DTRs 2+ intact and symmetric; negative Harris's b/l; negative clonus b/l  CHEST/LUNG: Clear to auscultation bilaterally; no rales, rhonchi, wheezing, or rubs  HEART: +S1/+S2; Regular rate and rhythm; no murmurs, rubs, or gallops  ABDOMEN: Soft, nontender, nondistended; bowel sounds present all four quadrants  EXTREMITIES:  2+ peripheral pulses, no clubbing, cyanosis, or edema  SKIN: Warm, dry; no rashes or lesions      LABS:                        12.4   12.30 )-----------( 436      ( 10 Gabriel 2022 07:07 )             38.6     06-10    134<L>  |  95<L>  |  14.6  ----------------------------<  136<H>  3.4<L>   |  27.0  |  1.11    Ca    9.3      10 Gabriel 2022 07:07        06-09 @ 07:01  -  06-10 @ 07:00  --------------------------------------------------------  IN: 0 mL / OUT: 10 mL / NET: -10 mL        RADIOLOGY & ADDITIONAL TESTS:    ACC: [id number] EXAM:  MR SPINE LUMBAR                        ACC: [id number] EXAM:  MR SPINE THORACIC                        PROCEDURE DATE:  06/06/2022    IMPRESSION: Grossly stable discitis/osteomyelitis at the T7/T8 level with   ventral and dorsal epidural collections, as above. Stable   discitis/osteomyelitis at L1/L2.

## 2022-06-10 NOTE — PROGRESS NOTE ADULT - NS ATTEND AMEND GEN_ALL_CORE FT
NSGY Attg:    see above    patient seen and examined    agree with exam and plan as above
tolerated surgery well. no cardiac complicaitons  No further in-patient cardiac work-up/management is needed.  Follow-up in cardiology office in 2 weeks.    saw.u with his cardilogis.t

## 2022-06-10 NOTE — PROGRESS NOTE ADULT - ASSESSMENT
This 61M with HTN, DVT/PE, Testicular CA s/p chemo/radiation/LN Dissection c/o SOB/RICKETTS and cough admitted to [facility] for Acute Hypoxic Respiratory Failure 2/2 COVID PNA. Treated with steroids and remdesivir. Found to have MSSA bacteremia treated with Ancef. Subsequently c/o low back pain had MRI WO which noted diskitis/osteomyelitis and epidural abscess. Patient evaluated by Neurosurgery at [place] who recommended IR intervention and transfer to [facility]. Neurosurgery consulted. Pt seen/examined. Denies current complaints. Doing well. ROS negative. Neuro exam intact. Discussed with RN.     PMHX: HTN, DVT/PE, Testicular CA s/p chemo/radiation/LN Dissection  PSHX: Radical Orchiectomy, Hip Replacement, Hernia Repair  FamHx: +Mother CA +Mother DM2, +Father CAD  Social Hx: Former Smoker quit 20 years ago  NKDA (05 Jun 2022 02:52)    patient has been having back pain; recalls episode of severe back pain in a [employer] experience of [employer] in 10/2021. he went to see a spine Dr. In [place] Who dx him with arthritis of the back and treated with pain meds.   about 2 weeks ago, he developed a boil in his RIGHT groin. He denies squeezing at it, but stated that it went away as quick as it came.     Denies IVDU.   no other skin conditions to report.     MRI and CT scan from [facility] reviewed.   MSSA identified in blood cultures on 5/29/22 x 2 sets  but cleared on 5/31/22 x 2 sets, and 6/1/22 x 2 sets.       Impression:  WBC elevation  MSSA bacteremia  Spine Osteomyelitis  thoracic spine abscess  back pan      Plan:  - neurosurgical team  following  s/p T8-T9 laminectomy 6/6/22; 2 grams of vanco powder placed in the wound per note.   OR cultures in process    - continue Cefazolin 2 grams Q8H    blood cultures from 6/5/22 are positive here; repeat blood cx ordered for 6/7/22  is positive  repeat blood cultures sent on 6/9/22    continue Rifampin 300mg PO BID, started on 6/9/22      PICC Line only after negative blood cx.   PICC Line because of at least 6 weeks of IV antibiotics.   midline is NOT an alternative.    WBC elevation is reactive  - will follow and trend    control of back pain per primary team        regarding COVID-19 testing  positive in [place], then negative prior to transfer, and negative here.   no evidence of Clinical COVID-19  will not treat  Isolation guidance as per infection control

## 2022-06-11 LAB
ANION GAP SERPL CALC-SCNC: 12 MMOL/L — SIGNIFICANT CHANGE UP (ref 5–17)
BUN SERPL-MCNC: 13.2 MG/DL — SIGNIFICANT CHANGE UP (ref 8–20)
CALCIUM SERPL-MCNC: 9 MG/DL — SIGNIFICANT CHANGE UP (ref 8.6–10.2)
CHLORIDE SERPL-SCNC: 95 MMOL/L — LOW (ref 98–107)
CO2 SERPL-SCNC: 26 MMOL/L — SIGNIFICANT CHANGE UP (ref 22–29)
CREAT SERPL-MCNC: 1.12 MG/DL — SIGNIFICANT CHANGE UP (ref 0.5–1.3)
CULTURE RESULTS: SIGNIFICANT CHANGE UP
CULTURE RESULTS: SIGNIFICANT CHANGE UP
EGFR: 74 ML/MIN/1.73M2 — SIGNIFICANT CHANGE UP
GLUCOSE SERPL-MCNC: 130 MG/DL — HIGH (ref 70–99)
HCT VFR BLD CALC: 37 % — LOW (ref 39–50)
HGB BLD-MCNC: 12.1 G/DL — LOW (ref 13–17)
MCHC RBC-ENTMCNC: 30.2 PG — SIGNIFICANT CHANGE UP (ref 27–34)
MCHC RBC-ENTMCNC: 32.7 GM/DL — SIGNIFICANT CHANGE UP (ref 32–36)
MCV RBC AUTO: 92.3 FL — SIGNIFICANT CHANGE UP (ref 80–100)
ORGANISM # SPEC MICROSCOPIC CNT: SIGNIFICANT CHANGE UP
PLATELET # BLD AUTO: 409 K/UL — HIGH (ref 150–400)
POTASSIUM SERPL-MCNC: 3.7 MMOL/L — SIGNIFICANT CHANGE UP (ref 3.5–5.3)
POTASSIUM SERPL-SCNC: 3.7 MMOL/L — SIGNIFICANT CHANGE UP (ref 3.5–5.3)
RBC # BLD: 4.01 M/UL — LOW (ref 4.2–5.8)
RBC # FLD: 13.1 % — SIGNIFICANT CHANGE UP (ref 10.3–14.5)
SODIUM SERPL-SCNC: 133 MMOL/L — LOW (ref 135–145)
SPECIMEN SOURCE: SIGNIFICANT CHANGE UP
SPECIMEN SOURCE: SIGNIFICANT CHANGE UP
WBC # BLD: 11.82 K/UL — HIGH (ref 3.8–10.5)
WBC # FLD AUTO: 11.82 K/UL — HIGH (ref 3.8–10.5)

## 2022-06-11 PROCEDURE — 99233 SBSQ HOSP IP/OBS HIGH 50: CPT

## 2022-06-11 PROCEDURE — 99232 SBSQ HOSP IP/OBS MODERATE 35: CPT

## 2022-06-11 RX ORDER — BENZOCAINE AND MENTHOL 5; 1 G/100ML; G/100ML
1 LIQUID ORAL THREE TIMES A DAY
Refills: 0 | Status: DISCONTINUED | OUTPATIENT
Start: 2022-06-11 | End: 2022-06-15

## 2022-06-11 RX ADMIN — BENZOCAINE AND MENTHOL 1 LOZENGE: 5; 1 LIQUID ORAL at 17:13

## 2022-06-11 RX ADMIN — Medication 240 MILLIGRAM(S): at 04:49

## 2022-06-11 RX ADMIN — ATORVASTATIN CALCIUM 10 MILLIGRAM(S): 80 TABLET, FILM COATED ORAL at 22:45

## 2022-06-11 RX ADMIN — Medication 75 MILLIGRAM(S): at 17:14

## 2022-06-11 RX ADMIN — OXYCODONE HYDROCHLORIDE 5 MILLIGRAM(S): 5 TABLET ORAL at 20:33

## 2022-06-11 RX ADMIN — Medication 100 MILLIGRAM(S): at 04:46

## 2022-06-11 RX ADMIN — OXYCODONE HYDROCHLORIDE 5 MILLIGRAM(S): 5 TABLET ORAL at 15:27

## 2022-06-11 RX ADMIN — OXYCODONE HYDROCHLORIDE 5 MILLIGRAM(S): 5 TABLET ORAL at 10:00

## 2022-06-11 RX ADMIN — OXYCODONE HYDROCHLORIDE 5 MILLIGRAM(S): 5 TABLET ORAL at 06:17

## 2022-06-11 RX ADMIN — Medication 100 MILLIGRAM(S): at 14:55

## 2022-06-11 RX ADMIN — LOSARTAN POTASSIUM 25 MILLIGRAM(S): 100 TABLET, FILM COATED ORAL at 04:49

## 2022-06-11 RX ADMIN — OXYCODONE HYDROCHLORIDE 5 MILLIGRAM(S): 5 TABLET ORAL at 20:03

## 2022-06-11 RX ADMIN — PANTOPRAZOLE SODIUM 40 MILLIGRAM(S): 20 TABLET, DELAYED RELEASE ORAL at 04:49

## 2022-06-11 RX ADMIN — Medication 75 MILLIGRAM(S): at 04:49

## 2022-06-11 RX ADMIN — OXYCODONE HYDROCHLORIDE 5 MILLIGRAM(S): 5 TABLET ORAL at 09:53

## 2022-06-11 RX ADMIN — OXYCODONE HYDROCHLORIDE 5 MILLIGRAM(S): 5 TABLET ORAL at 04:48

## 2022-06-11 RX ADMIN — Medication 100 MILLIGRAM(S): at 22:44

## 2022-06-11 NOTE — PROGRESS NOTE ADULT - SUBJECTIVE AND OBJECTIVE BOX
Chief Complaint:  epidural abscess    SUBJECTIVE / OVERNIGHT EVENTS: No acute events reported overnight.   Pt offers no acute complaints at this time.  Patient denies chest pain, SOB, abd pain, N/V, fever, chills, dysuria or any other complaints. All remainder ROS negative.       I&O's Summary    10 Gabriel 2022 07:01  -  11 Jun 2022 07:00  --------------------------------------------------------  IN: 0 mL / OUT: 600 mL / NET: -600 mL    11 Jun 2022 07:01  -  11 Jun 2022 12:47  --------------------------------------------------------  IN: 0 mL / OUT: 700 mL / NET: -700 mL          PHYSICAL EXAM:  Vital Signs Last 24 Hrs  T(C): 37.1 (11 Jun 2022 10:57), Max: 37.2 (10 Gabriel 2022 21:47)  T(F): 98.8 (11 Jun 2022 10:57), Max: 98.9 (10 Gabriel 2022 21:47)  HR: 85 (11 Jun 2022 10:57) (83 - 93)  BP: 98/62 (11 Jun 2022 10:57) (96/68 - 115/80)  BP(mean): --  RR: 18 (11 Jun 2022 10:57) (18 - 20)  SpO2: 95% (11 Jun 2022 10:57) (92% - 98%)      GENERAL: pt examined bedside, laying comfortably in bed in NAD  HEENT: NC/AT, moist oral mucosa, clear conjunctiva, sclera nonicteric  RESPIRATORY: Normal respiratory effort; CTA b/l, no wheezing, rhonchi, rales  CARDIOVASCULAR: RRR, normal S1 and S2, no murmur/rub/gallop  ABDOMEN: soft, NT/ND, normoactive bowel sounds, no rebound/guarding  MUSCLOSKELETAL:  spinal dressing CDI   EXTREMITIES: No cynaosis, no clubbing, no lower extremity edema; Peripheral pulses are 2+ bilaterally  PSYCH: affect appropriate and cooperative  NEUROLOGY: A+O to person, place, and time, decreased plantar sensation (chronic), no focal neurologic deficits appreciated   SKIN: No rashes or no palpable lesions        LABS:                        12.1   11.82 )-----------( 409      ( 11 Jun 2022 07:12 )             37.0     06-11    133<L>  |  95<L>  |  13.2  ----------------------------<  130<H>  3.7   |  26.0  |  1.12    Ca    9.0      11 Jun 2022 07:12                Culture - Blood (collected 10 Gabriel 2022 11:20)  Source: .Blood Blood-Venous  Preliminary Report (11 Jun 2022 12:01):    No growth to date.    Culture - Blood (collected 09 Jun 2022 15:08)  Source: .Blood Blood-Peripheral  Preliminary Report (10 Gabriel 2022 16:01):    No growth to date.    Culture - Blood (collected 09 Jun 2022 15:08)  Source: .Blood Blood-Peripheral  Preliminary Report (10 Gabriel 2022 16:01):    No growth to date.      CAPILLARY BLOOD GLUCOSE            RADIOLOGY & ADDITIONAL TESTS:    < from: MR Lumbar Spine No Cont (06.06.22 @ 10:45) >  FINDINGS:    Redemonstrated is abnormal signal at T7/T8 compatible to patient's known   discitis osteomyelitis. Left arm midline ventral flattening at the T7/T8   level causing mild rightward cord compression on image 24 of series 9 is   redemonstrated and measures 8.4 x 7.7 x 30 mm. Posterior epidural   collection at the T7, T8, E1elormv best seen on image 8 of series 8   measuring 8.7 cm in its greatest CC dimension.    Abnormal signal changes at the L1/L2 level also concerning for   discitis/osteomyelitis.    Stable left para-aortic cyst measuring 3.8 x 3.2 x 5.5 cm. Bilateral   renal cyst.    Diffuse heterogeneous T1 marrow signal.    IMPRESSION: Grossly stable discitis/osteomyelitis at the T7/T8 level with   ventral and dorsal epidural collections, as above. Stable   discitis/osteomyelitis at L1/L2.    < end of copied text >        < from: CT Thoracic Spine No Cont (06.05.22 @ 10:45) >  IMPRESSION: Endplate erosion T7-8, T8-9 and L1-2 consistent with   osteomyelitis and discitis as on the MRI of the thoracic and lumbar spine   of 6/4/2022. Mild anterior and paraspinal soft tissue prominence at these   levels. The ventral epidural extension and the circumferential epidural   collection seen on the MRI is not appreciated.    < end of copied text >        MEDICATIONS  (STANDING):  atorvastatin 10 milliGRAM(s) Oral at bedtime  ceFAZolin   IVPB 2000 milliGRAM(s) IV Intermittent every 8 hours  diltiazem    milliGRAM(s) Oral daily  losartan 25 milliGRAM(s) Oral daily  metoprolol tartrate 75 milliGRAM(s) Oral two times a day  pantoprazole    Tablet 40 milliGRAM(s) Oral before breakfast  polyethylene glycol 3350 17 Gram(s) Oral daily  rifAMPin 300 milliGRAM(s) Oral two times a day  senna 2 Tablet(s) Oral at bedtime    MEDICATIONS  (PRN):  acetaminophen     Tablet .. 650 milliGRAM(s) Oral every 6 hours PRN Temp greater or equal to 38C (100.4F), Mild Pain (1 - 3)  aluminum hydroxide/magnesium hydroxide/simethicone Suspension 30 milliLiter(s) Oral every 4 hours PRN Dyspepsia  melatonin 3 milliGRAM(s) Oral at bedtime PRN Insomnia  ondansetron Injectable 4 milliGRAM(s) IV Push every 8 hours PRN Nausea and/or Vomiting  oxyCODONE    IR 5 milliGRAM(s) Oral every 4 hours PRN Moderate Pain (4 - 6)  oxyCODONE    IR 10 milliGRAM(s) Oral every 4 hours PRN Severe Pain (7 - 10)

## 2022-06-11 NOTE — PROGRESS NOTE ADULT - ASSESSMENT
61 jy/o M w/ PMH of HTN, DVT/PE, Testicular CA s/p chemo/radiation/LN Dissection was admitted to  for Acute Hypoxic Respiratory Failure 2/2 COVID PNA w/ hospital course complicated by MSSA Bacteremia subsequently found to have Diskitis/Osteomyelitis and Epidural Abscess for which he was transferred to [facility] for IR and Neurosurgical evaluation/intervention.  Pt underwent laminectomy with abscess drainage and laminectomy on 06/06.       Diskitis/Osteomyelitis/Epidural Abscess r/o Cord Compression/Cauda Equina c/b MSSA bacteremia  - s/p Laminectomy POD4  - Bcx from 06/09 NGTD   - Given repeat BCx (-) pt can now have PICC placed for prolonged course of IV abx    - Leukocytosis trending down, pt remains afebrile and nontoxic appearing   - Maintain on IV antibiotics   - Neurosurgery and ID following and recs noted       Acute Hypoxic Resp Failure 2/2 COVID PNA  - Vaccinated x2  - CTA Chest negative for PE  - Completed Remdesivir at [place]  - Currently on room air   - Isolation d/c'd per ID recs       HTN / HLD  - Cardizem 240mg PO q24  - Metoprolol Tartrate 75mg PO BID  - Losartan 25mg PO q24      Testicular CA s/p chemo/radiation/LN Dissection  - PETSCAN 6-7 mos ago negative for malignancy  - CT ABD findings likely known Lymphocele / postsurgical changes from prior LN dissection  - Outpatient F/u w/ [facility]       VTE ppx: SCDs for now given recent laminectomy     Dispo: Needs picc line prior to d/c for prolonged course of IV antibiotics.  Anticipate d/c Monday.

## 2022-06-11 NOTE — PROGRESS NOTE ADULT - ASSESSMENT
This 61M with HTN, DVT/PE, Testicular CA s/p chemo/radiation/LN Dissection c/o SOB/RICKETTS and cough admitted to [facility] for Acute Hypoxic Respiratory Failure 2/2 COVID PNA. Treated with steroids and remdesivir. Found to have MSSA bacteremia treated with Ancef. Subsequently c/o low back pain had MRI WO which noted diskitis/osteomyelitis and epidural abscess. Patient evaluated by Neurosurgery at [place] who recommended IR intervention and transfer to [facility]. Neurosurgery consulted.     about 2 weeks ago, he developed a boil in his RIGHT groin. He denies squeezing at it, but stated that it went away as quick as it came.           Impression:  WBC elevation  MSSA bacteremia  Spine Osteomyelitis  thoracic spine abscess  back pan      Plan:  - neurosurgical team  following  s/p T8-T9 laminectomy 6/6/22; 2 grams of vanco powder placed in the wound per note.   OR cultures in process    - continue Cefazolin 2 grams Q8H    blood cultures from 6/5/22 are positive here; repeat blood cx ordered for 6/7/22  is positive  repeat blood cultures sent on 6/9/22    continue Rifampin 300mg PO BID, started on 6/9/22      COVID AT [place] 5/29  2 NEG SWABS   ISOLATION DISCONTINUED    REPEAT BLOOD CX NOW NEG   PLAN PICC LINE LONG TERM IV ABX  DR DAVID TO FOLLOWUP ON MONDAY

## 2022-06-11 NOTE — PROGRESS NOTE ADULT - SUBJECTIVE AND OBJECTIVE BOX
INFECTIOUS DISEASES AND INTERNAL MEDICINE at [place]  =======================================================  Vince Villarreal MD  Diplomates American Board of Internal Medicine and Infectious Diseases  Telephone [phone]  Fax            [phone]  =======================================================    MATTDIANA [id number]    Follow up: EPIDURAL ABSCESS MSSA BACTEREMIA    Allergies:  No Known Allergies      Medications:  acetaminophen     Tablet .. 650 milliGRAM(s) Oral every 6 hours PRN  aluminum hydroxide/magnesium hydroxide/simethicone Suspension 30 milliLiter(s) Oral every 4 hours PRN  atorvastatin 10 milliGRAM(s) Oral at bedtime  ceFAZolin   IVPB 2000 milliGRAM(s) IV Intermittent every 8 hours  diltiazem    milliGRAM(s) Oral daily  losartan 25 milliGRAM(s) Oral daily  melatonin 3 milliGRAM(s) Oral at bedtime PRN  metoprolol tartrate 75 milliGRAM(s) Oral two times a day  ondansetron Injectable 4 milliGRAM(s) IV Push every 8 hours PRN  oxyCODONE    IR 5 milliGRAM(s) Oral every 4 hours PRN  oxyCODONE    IR 10 milliGRAM(s) Oral every 4 hours PRN  pantoprazole    Tablet 40 milliGRAM(s) Oral before breakfast  polyethylene glycol 3350 17 Gram(s) Oral daily  rifAMPin 300 milliGRAM(s) Oral two times a day  senna 2 Tablet(s) Oral at bedtime    SOCIAL       FAMILY   FAMILY HISTORY:    REVIEW OF SYSTEMS:  CONSTITUTIONAL:  No Fever or chills  HEENT:   No diplopia or blurred vision.  No earache, sore throat or runny nose.  CARDIOVASCULAR:  No pressure, squeezing, strangling, tightness, heaviness or aching about the chest, neck, axilla or epigastrium.  RESPIRATORY:  No cough, shortness of breath, PND or orthopnea.  GASTROINTESTINAL:  No nausea, vomiting or diarrhea.  GENITOURINARY:  No dysuria, frequency or urgency. No Blood in urine  MUSCULOSKELETAL:  AS PER HPI   SKIN:  No change in skin, hair or nails.  NEUROLOGIC:  No paresthesias, fasciculations, seizures or weakness.  PSYCHIATRIC:  No disorder of thought or mood.  ENDOCRINE:  No heat or cold intolerance, polyuria or polydipsia.  HEMATOLOGICAL:  No easy bruising or bleeding.            Physical Exam:  ICU Vital Signs Last 24 Hrs  T(C): 37.1 (11 Jun 2022 10:57), Max: 37.2 (10 Gabriel 2022 21:47)  T(F): 98.8 (11 Jun 2022 10:57), Max: 98.9 (10 Gabriel 2022 21:47)  HR: 85 (11 Jun 2022 10:57) (83 - 93)  BP: 98/62 (11 Jun 2022 10:57) (96/68 - 115/80)  BP(mean): --  ABP: --  ABP(mean): --  RR: 18 (11 Jun 2022 10:57) (18 - 20)  SpO2: 95% (11 Jun 2022 10:57) (92% - 98%)    GEN: NAD,   HEENT: normocephalic and atraumatic. EOMI. AYDEE.    NECK: Supple. No carotid bruits.  No lymphadenopathy or thyromegaly.  LUNGS: Clear to auscultation.  HEART: Regular rate and rhythm without murmur.  ABDOMEN: Soft, nontender, and nondistended.  Positive bowel sounds.    : No CVA tenderness  EXTREMITIES: Without any cyanosis, clubbing, rash, lesions or edema.  MSK: no joint swelling  NEUROLOGIC: Cranial nerves II through XII are grossly intact.  PSYCHIATRIC: Appropriate affect .  SKIN: No ulceration or induration present.        Labs:  Vitals:  ============  T(F): 98.8 (11 Jun 2022 10:57), Max: 98.9 (10 Gabriel 2022 21:47)  HR: 85 (11 Jun 2022 10:57)  BP: 98/62 (11 Jun 2022 10:57)  RR: 18 (11 Jun 2022 10:57)  SpO2: 95% (11 Jun 2022 10:57) (92% - 98%)  temp max in last 48H T(F): , Max: 98.9 (06-10-22 @ 21:47)    =======================================================  Current Antibiotics:  ceFAZolin   IVPB 2000 milliGRAM(s) IV Intermittent every 8 hours  rifAMPin 300 milliGRAM(s) Oral two times a day    Other medications:  atorvastatin 10 milliGRAM(s) Oral at bedtime  diltiazem    milliGRAM(s) Oral daily  losartan 25 milliGRAM(s) Oral daily  metoprolol tartrate 75 milliGRAM(s) Oral two times a day  pantoprazole    Tablet 40 milliGRAM(s) Oral before breakfast  polyethylene glycol 3350 17 Gram(s) Oral daily  senna 2 Tablet(s) Oral at bedtime      =======================================================  Labs:                        12.1   11.82 )-----------( 409      ( 11 Jun 2022 07:12 )             37.0     06-11    133<L>  |  95<L>  |  13.2  ----------------------------<  130<H>  3.7   |  26.0  |  1.12    Ca    9.0      11 Jun 2022 07:12        Culture - Blood (collected 06-09-22 @ 15:08)  Source: .Blood Blood-Peripheral    Culture - Blood (collected 06-09-22 @ 15:08)  Source: .Blood Blood-Peripheral    Culture - Blood (collected 06-07-22 @ 16:58)  Source: .Blood Blood-Peripheral    Culture - Blood (collected 06-07-22 @ 16:58)  Source: .Blood Blood-Peripheral  Gram Stain (06-08-22 @ 21:50):    Growth in aerobic bottle: Gram Positive Cocci in Clusters  Final Report (06-09-22 @ 16:51):    Growth in aerobic bottle: Staphylococcus aureus    See previous culture [id number]    Culture - Acid Fast - Tissue w/Smear (collected 06-07-22 @ 03:39)  Source: .Tissue epidural phlegmon    Culture - Tissue with Gram Stain (collected 06-07-22 @ 02:26)  Source: .Tissue epidural phlegmon  Gram Stain (06-07-22 @ 03:24):    No polymorphonuclear leukocytes seen per low power field    No organisms seen per oil power field  Organism: Staphylococcus aureus (06-09-22 @ 15:42)  Organism: Staphylococcus aureus (06-09-22 @ 15:42)    Sensitivities:      -  Ampicillin/Sulbactam: S <=8/4      -  Cefazolin: S <=4      -  Clindamycin: R 0.5 This isolate is presumed to be clindamycin resistant based on detection of inducible resistance. Clindamycin may still be effective in some patients.      -  Erythromycin: R >4      -  Gentamicin: S <=1 Should not be used as monotherapy      -  Oxacillin: S 1 Oxacillin predicts susceptibility for dicloxacillin, methicillin, and nafcillin      -  Penicillin: R >8      -  Rifampin: S <=1 Should not be used as monotherapy      -  Tetra/Doxy: S <=1      -  Trimethoprim/Sulfamethoxazole: S <=0.5/9.5      -  Vancomycin: S 2      Method Type: JOVANI    Culture - Surgical Swab (collected 06-07-22 @ 02:15)  Source: .Surgical Swab thoracic epidural (swab)  Organism: Staphylococcus aureus (06-08-22 @ 20:15)  Organism: Staphylococcus aureus (06-08-22 @ 20:15)    Sensitivities:      -  Ampicillin/Sulbactam: S <=8/4      -  Cefazolin: S <=4      -  Clindamycin: R <=0.25 This isolate is presumed to be clindamycin resistant based on detection of inducible resistance. Clindamycin may still be effective in some patients.      -  Erythromycin: R >4      -  Gentamicin: S <=1 Should not be used as monotherapy      -  Oxacillin: S 0.5 Oxacillin predicts susceptibility for dicloxacillin, methicillin, and nafcillin      -  Penicillin: R >8      -  Rifampin: S <=1 Should not be used as monotherapy      -  Tetra/Doxy: S <=1      -  Trimethoprim/Sulfamethoxazole: S <=0.5/9.5      -  Vancomycin: S 2      Method Type: JOVANI    Culture - Blood (collected 06-05-22 @ 02:06)  Source: .Blood Blood-Peripheral  Gram Stain (06-07-22 @ 09:08):    Growth in anaerobic bottle: Gram Positive Cocci in Clusters    ***Blood Panel PCR results on this specimen are available    approximately 3 hours after the Gram stain result.***    Gram stain, PCR, and/or culture results may not always    correspond due todifference in methodologies.    ************************************************************    This PCR assay was performed using [employer].    The following targets are tested for: Enterococcus,    vancomycin resistant enterococci, Listeria monocytogenes,    coagulase negative staphylococci, S. aureus,    methicillin resistant S. aureus, Streptococcus agalactiae    (Group B), S. pneumoniae, S. pyogenes (Group A),    Acinetobacter baumannii, Enterobacter cloacae, E. coli,    Klebsiella oxytoca, K. pneumoniae, Proteus sp.,    Serratia marcescens, Haemophilus influenzae,    Neisseria meningitidis, Pseudomonas aeruginosa, Candida    albicans, C. glabrata, C krusei, C parapsilosis,    C. tropicalis and the KPC resistance gene.    Gram Stain and BCID performed by:    [facility]    [address] [place] [address]    .    TYPE: (C=Critical, N=Notification, A=Abnormal) C    TESTS:  _ GS    DATE/TIME CALLED: _ 06/07/2022 09:07:29    CALLED TO: Johnie Up RN    READ BACK (2 Patient Identifiers)(Y/N): _ Y    READ BACK VALUES (Y/N): _ Y    CALLED BY: Johnie Gardner  Final Report (06-09-22 @ 08:52):    Growth in anaerobic bottle: Staphylococcus aureus  Organism: Blood Culture PCR  Staphylococcus aureus (06-09-22 @ 16:50)  Organism: Staphylococcus aureus (06-09-22 @ 16:50)    Sensitivities:      -  Ampicillin/Sulbactam: S <=8/4      -  Cefazolin: S <=4      -  Clindamycin: R <=0.25 This isolate is presumed to be clindamycin resistant based on detection of inducible resistance. Clindamycin may still be effective in some patients.      -  Erythromycin: R >4      -  Gentamicin: S <=1 Should not be used as monotherapy      -  Oxacillin: S 0.5 Oxacillin predicts susceptibility for dicloxacillin, methicillin, and nafcillin      -  Penicillin: R >8      -  Rifampin: S <=1 Should not be used as monotherapy      -  Tetra/Doxy: S <=1      -  Trimethoprim/Sulfamethoxazole: S <=0.5/9.5      -  Vancomycin: S 1      Method Type: JOVANI  Organism: Blood Culture PCR (06-09-22 @ 08:52)    Sensitivities:      -  Methicillin SENSITIVE Staphylococcus aureus (MSSA): Detec Any isolate of Staphylococcus aureus from a blood culture is NOT considered a contaminant.      Method Type: PCR    Culture - Blood (collected 06-05-22 @ 02:05)  Source: .Blood Blood-Peripheral  Final Report (06-10-22 @ 03:00):    No growth at 5 days.      Creatinine, Serum: 1.12 mg/dL (06-11-22 @ 07:12)  Creatinine, Serum: 1.11 mg/dL (06-10-22 @ 07:07)  Creatinine, Serum: 1.07 mg/dL (06-09-22 @ 07:10)  Creatinine, Serum: 1.16 mg/dL (06-08-22 @ 06:11)  Creatinine, Serum: 1.12 mg/dL (06-07-22 @ 06:15)    Procalcitonin, Serum: 0.12 ng/mL (06-05-22 @ 02:05)        C-Reactive Protein, Serum: 23 mg/L (06-05-22 @ 02:05)    WBC Count: 11.82 K/uL (06-11-22 @ 07:12)  WBC Count: 12.30 K/uL (06-10-22 @ 07:07)  WBC Count: 15.76 K/uL (06-09-22 @ 07:10)  WBC Count: 16.12 K/uL (06-08-22 @ 06:11)  WBC Count: 17.84 K/uL (06-07-22 @ 06:15)    COVID-19 PCR: NotDetec (06-05-22 @ 17:07)      Alkaline Phosphatase, Serum: 92 U/L (06-08-22 @ 06:11)  Alanine Aminotransferase (ALT/SGPT): 13 U/L (06-08-22 @ 06:11)  Aspartate Aminotransferase (AST/SGOT): 15 U/L (06-08-22 @ 06:11)  Bilirubin Total, Serum: 0.7 mg/dL (06-08-22 @ 06:11)

## 2022-06-12 LAB
ALBUMIN SERPL ELPH-MCNC: 3.1 G/DL — LOW (ref 3.3–5.2)
ALP SERPL-CCNC: 132 U/L — HIGH (ref 40–120)
ALT FLD-CCNC: 9 U/L — SIGNIFICANT CHANGE UP
ANION GAP SERPL CALC-SCNC: 11 MMOL/L — SIGNIFICANT CHANGE UP (ref 5–17)
AST SERPL-CCNC: 11 U/L — SIGNIFICANT CHANGE UP
BASOPHILS # BLD AUTO: 0 K/UL — SIGNIFICANT CHANGE UP (ref 0–0.2)
BASOPHILS NFR BLD AUTO: 0 % — SIGNIFICANT CHANGE UP (ref 0–2)
BILIRUB SERPL-MCNC: 0.6 MG/DL — SIGNIFICANT CHANGE UP (ref 0.4–2)
BUN SERPL-MCNC: 10.7 MG/DL — SIGNIFICANT CHANGE UP (ref 8–20)
CALCIUM SERPL-MCNC: 9.6 MG/DL — SIGNIFICANT CHANGE UP (ref 8.6–10.2)
CHLORIDE SERPL-SCNC: 98 MMOL/L — SIGNIFICANT CHANGE UP (ref 98–107)
CO2 SERPL-SCNC: 28 MMOL/L — SIGNIFICANT CHANGE UP (ref 22–29)
CREAT SERPL-MCNC: 1.18 MG/DL — SIGNIFICANT CHANGE UP (ref 0.5–1.3)
CULTURE RESULTS: SIGNIFICANT CHANGE UP
EGFR: 70 ML/MIN/1.73M2 — SIGNIFICANT CHANGE UP
EOSINOPHIL # BLD AUTO: 0.07 K/UL — SIGNIFICANT CHANGE UP (ref 0–0.5)
EOSINOPHIL NFR BLD AUTO: 0.9 % — SIGNIFICANT CHANGE UP (ref 0–6)
GIANT PLATELETS BLD QL SMEAR: PRESENT — SIGNIFICANT CHANGE UP
GLUCOSE SERPL-MCNC: 122 MG/DL — HIGH (ref 70–99)
GRAM STN FLD: SIGNIFICANT CHANGE UP
HCT VFR BLD CALC: 37.5 % — LOW (ref 39–50)
HGB BLD-MCNC: 12.2 G/DL — LOW (ref 13–17)
LYMPHOCYTES # BLD AUTO: 0.76 K/UL — LOW (ref 1–3.3)
LYMPHOCYTES # BLD AUTO: 9.5 % — LOW (ref 13–44)
MANUAL SMEAR VERIFICATION: SIGNIFICANT CHANGE UP
MCHC RBC-ENTMCNC: 30.1 PG — SIGNIFICANT CHANGE UP (ref 27–34)
MCHC RBC-ENTMCNC: 32.5 GM/DL — SIGNIFICANT CHANGE UP (ref 32–36)
MCV RBC AUTO: 92.6 FL — SIGNIFICANT CHANGE UP (ref 80–100)
METAMYELOCYTES # FLD: 0.9 % — HIGH (ref 0–0)
METHOD TYPE: SIGNIFICANT CHANGE UP
MONOCYTES # BLD AUTO: 0.62 K/UL — SIGNIFICANT CHANGE UP (ref 0–0.9)
MONOCYTES NFR BLD AUTO: 7.8 % — SIGNIFICANT CHANGE UP (ref 2–14)
MSSA DNA SPEC QL NAA+PROBE: SIGNIFICANT CHANGE UP
NEUTROPHILS # BLD AUTO: 5.83 K/UL — SIGNIFICANT CHANGE UP (ref 1.8–7.4)
NEUTROPHILS NFR BLD AUTO: 72.2 % — SIGNIFICANT CHANGE UP (ref 43–77)
NEUTS BAND # BLD: 0.9 % — SIGNIFICANT CHANGE UP (ref 0–8)
PLAT MORPH BLD: NORMAL — SIGNIFICANT CHANGE UP
PLATELET # BLD AUTO: 415 K/UL — HIGH (ref 150–400)
POTASSIUM SERPL-MCNC: 4.4 MMOL/L — SIGNIFICANT CHANGE UP (ref 3.5–5.3)
POTASSIUM SERPL-SCNC: 4.4 MMOL/L — SIGNIFICANT CHANGE UP (ref 3.5–5.3)
PROT SERPL-MCNC: 6.7 G/DL — SIGNIFICANT CHANGE UP (ref 6.6–8.7)
RBC # BLD: 4.05 M/UL — LOW (ref 4.2–5.8)
RBC # FLD: 13.2 % — SIGNIFICANT CHANGE UP (ref 10.3–14.5)
RBC BLD AUTO: NORMAL — SIGNIFICANT CHANGE UP
SARS-COV-2 RNA SPEC QL NAA+PROBE: SIGNIFICANT CHANGE UP
SODIUM SERPL-SCNC: 137 MMOL/L — SIGNIFICANT CHANGE UP (ref 135–145)
SPECIMEN SOURCE: SIGNIFICANT CHANGE UP
VARIANT LYMPHS # BLD: 7.8 % — HIGH (ref 0–6)
WBC # BLD: 7.97 K/UL — SIGNIFICANT CHANGE UP (ref 3.8–10.5)
WBC # FLD AUTO: 7.97 K/UL — SIGNIFICANT CHANGE UP (ref 3.8–10.5)

## 2022-06-12 PROCEDURE — 99232 SBSQ HOSP IP/OBS MODERATE 35: CPT

## 2022-06-12 RX ADMIN — LOSARTAN POTASSIUM 25 MILLIGRAM(S): 100 TABLET, FILM COATED ORAL at 06:38

## 2022-06-12 RX ADMIN — Medication 100 MILLIGRAM(S): at 13:53

## 2022-06-12 RX ADMIN — Medication 100 MILLIGRAM(S): at 06:39

## 2022-06-12 RX ADMIN — OXYCODONE HYDROCHLORIDE 5 MILLIGRAM(S): 5 TABLET ORAL at 03:14

## 2022-06-12 RX ADMIN — OXYCODONE HYDROCHLORIDE 10 MILLIGRAM(S): 5 TABLET ORAL at 21:24

## 2022-06-12 RX ADMIN — Medication 100 MILLIGRAM(S): at 22:03

## 2022-06-12 RX ADMIN — OXYCODONE HYDROCHLORIDE 5 MILLIGRAM(S): 5 TABLET ORAL at 14:53

## 2022-06-12 RX ADMIN — OXYCODONE HYDROCHLORIDE 5 MILLIGRAM(S): 5 TABLET ORAL at 10:36

## 2022-06-12 RX ADMIN — Medication 75 MILLIGRAM(S): at 06:37

## 2022-06-12 RX ADMIN — ATORVASTATIN CALCIUM 10 MILLIGRAM(S): 80 TABLET, FILM COATED ORAL at 22:04

## 2022-06-12 RX ADMIN — Medication 240 MILLIGRAM(S): at 06:37

## 2022-06-12 RX ADMIN — OXYCODONE HYDROCHLORIDE 10 MILLIGRAM(S): 5 TABLET ORAL at 20:51

## 2022-06-12 RX ADMIN — PANTOPRAZOLE SODIUM 40 MILLIGRAM(S): 20 TABLET, DELAYED RELEASE ORAL at 06:38

## 2022-06-12 RX ADMIN — OXYCODONE HYDROCHLORIDE 5 MILLIGRAM(S): 5 TABLET ORAL at 13:53

## 2022-06-12 RX ADMIN — Medication 3 MILLIGRAM(S): at 22:04

## 2022-06-12 RX ADMIN — Medication 75 MILLIGRAM(S): at 17:58

## 2022-06-12 RX ADMIN — OXYCODONE HYDROCHLORIDE 5 MILLIGRAM(S): 5 TABLET ORAL at 09:36

## 2022-06-12 NOTE — PROVIDER CONTACT NOTE (CRITICAL VALUE NOTIFICATION) - SITUATION
patient has blood culture result of Aerobic growth  with gram positive cocci in clusters
Pt s/p drain removal on back area

## 2022-06-12 NOTE — PROGRESS NOTE ADULT - SUBJECTIVE AND OBJECTIVE BOX
INTERVAL HPI/OVERNIGHT EVENTS:  60 Y/O male w/ Hx of HTN, DVT/PE, Testicular CA s/p chemo/radiation, transferred to [facility] from [facility] where he was treated for hypoxic respiratory failure 2/2 COVID, found to have MSSA bacteremia, c/o back pain, had imaging which showed T6-10 phlegmon and epidural abscess T7-8. Pt now s/p T6-9 multilevel thoracolumbar laminectomy for evacuation of epidural abscess.         Vital Signs Last 24 Hrs  T(C): 36.9 (12 Jun 2022 18:08), Max: 37 (11 Jun 2022 22:45)  T(F): 98.5 (12 Jun 2022 18:08), Max: 98.6 (11 Jun 2022 22:45)  HR: 96 (12 Jun 2022 17:23) (83 - 98)  BP: 104/71 (12 Jun 2022 17:23) (104/71 - 119/75)  BP(mean): --  RR: 18 (12 Jun 2022 17:23) (16 - 18)  SpO2: 96% (12 Jun 2022 17:23) (95% - 98%)      PHYSICAL EXAM:  GENERAL: NAD, well-groomed  HEAD:  Atraumatic, normocephalic  WOUND: Primary dressing appropriately soiled serosanguinous drainage  DOMINICK COMA SCORE: 15  MENTAL STATUS: AAO x3; Awake/Comatose; Opens eyes spontaneously; Appropriately conversant without aphasia  REFLEXES: PERRL. Corneals intact b/l.  MOTOR: Delt, bicep, tricep, hand , finger abduction strength 5/5 b/l. Hip flexion, knee extension, dorsiflexion, plantar flexion strength 5/5 b/l.  SENSATION: Grossly intact to light touch all extremities and entire torso.  COORDINATION: Gait and one leg balance intact.  EXTREMITIES:  No clubbing, cyanosis, edema, calf tenderness b/l  SKIN: Warm, dry; no rashes or lesions        LABS:                        12.2   7.97  )-----------( 415      ( 12 Jun 2022 06:29 )             37.5     06-12    137  |  98  |  10.7  ----------------------------<  122<H>  4.4   |  28.0  |  1.18    Ca    9.6      12 Jun 2022 06:29    TPro  6.7  /  Alb  3.1<L>  /  TBili  0.6  /  DBili  x   /  AST  11  /  ALT  9   /  AlkPhos  132<H>  06-12 06-11 @ 07:01  -  06-12 @ 07:00  --------------------------------------------------------  IN: 340 mL / OUT: 1850 mL / NET: -1510 mL        RADIOLOGY & ADDITIONAL TESTS:

## 2022-06-12 NOTE — PROGRESS NOTE ADULT - ASSESSMENT
61 jy/o M w/ PMH of HTN, DVT/PE, Testicular CA s/p chemo/radiation/LN Dissection was admitted to  for Acute Hypoxic Respiratory Failure 2/2 COVID PNA w/ hospital course complicated by MSSA Bacteremia subsequently found to have Diskitis/Osteomyelitis and Epidural Abscess for which he was transferred to [facility] for IR and Neurosurgical evaluation/intervention.  Pt underwent laminectomy with abscess drainage and laminectomy on 06/06.       Diskitis/Osteomyelitis/Epidural Abscess r/o Cord Compression/Cauda Equina c/b MSSA bacteremia  - s/p Laminectomy POD4  - Bcx x2 from 06/09 had been NGTD however today one of the bottles growing G(+) cocci in clusters, Bcx from 06/10 NGTD   - Will discuss update in Bcx from 06/09 w/ ID prior to placing PICC line    - Leukocytosis resolved, pt remains afebrile and nontoxic appearing   - Maintain on IV antibiotics   - Neurosurgery and ID following and recs noted       Acute Hypoxic Resp Failure 2/2 COVID PNA  - Vaccinated x2  - CTA Chest negative for PE  - Completed Remdesivir at [facility]  - Currently on room air   - Isolation d/c'd per ID recs       HTN / HLD  - Cardizem 240mg PO q24  - Metoprolol Tartrate 75mg PO BID  - Losartan 25mg PO q24      Testicular CA s/p chemo/radiation/LN Dissection  - PETSCAN 6-7 mos ago negative for malignancy  - CT ABD findings likely known Lymphocele / postsurgical changes from prior LN dissection  - Outpatient F/u w/ [facility]       VTE ppx: SCDs for now given recent laminectomy     Dispo: Needs picc line prior to d/c for prolonged course of IV antibiotics.

## 2022-06-12 NOTE — PROVIDER CONTACT NOTE (CRITICAL VALUE NOTIFICATION) - BACKGROUND
patient admitted with epidural abscess, bacteremia secondary to abscess. s/p laminectomy on 6/6
Pt with hx abcess to back

## 2022-06-12 NOTE — PROGRESS NOTE ADULT - SUBJECTIVE AND OBJECTIVE BOX
BLOOD CX 6/9  NOWD WITH POS GM POS COCCI IN CLUSTERS  WILL CANCEL PICC LINE  SUGGEST REPEAT CX  SPOKE TO HOSP

## 2022-06-12 NOTE — PROGRESS NOTE ADULT - SUBJECTIVE AND OBJECTIVE BOX
Chief Complaint:  epidural abscess    SUBJECTIVE / OVERNIGHT EVENTS: No acute events reported overnight.   Pt offers no acute complaints at this time.  Patient denies chest pain, SOB, abd pain, N/V, fever, chills, dysuria or any other complaints. All remainder ROS negative.       I&O's Summary    10 Gabriel 2022 07:01  -  11 Jun 2022 07:00  --------------------------------------------------------  IN: 0 mL / OUT: 600 mL / NET: -600 mL    11 Jun 2022 07:01  -  11 Jun 2022 12:47  --------------------------------------------------------  IN: 0 mL / OUT: 700 mL / NET: -700 mL          PHYSICAL EXAM:  Vital Signs Last 24 Hrs  T(C): 37.1 (11 Jun 2022 10:57), Max: 37.2 (10 Gabriel 2022 21:47)  T(F): 98.8 (11 Jun 2022 10:57), Max: 98.9 (10 Gabriel 2022 21:47)  HR: 85 (11 Jun 2022 10:57) (83 - 93)  BP: 98/62 (11 Jun 2022 10:57) (96/68 - 115/80)  BP(mean): --  RR: 18 (11 Jun 2022 10:57) (18 - 20)  SpO2: 95% (11 Jun 2022 10:57) (92% - 98%)      GENERAL: pt examined bedside, laying comfortably in bed in NAD  HEENT: NC/AT, moist oral mucosa, clear conjunctiva, sclera nonicteric  RESPIRATORY: Normal respiratory effort; CTA b/l, no wheezing, rhonchi, rales  CARDIOVASCULAR: RRR, normal S1 and S2, no murmur/rub/gallop  ABDOMEN: soft, NT/ND, normoactive bowel sounds, no rebound/guarding  MUSCLOSKELETAL:  spinal dressing CDI   EXTREMITIES: No cynaosis, no clubbing, no lower extremity edema; Peripheral pulses are 2+ bilaterally  PSYCH: affect appropriate and cooperative  NEUROLOGY: A+O to person, place, and time, decreased plantar sensation (chronic), no focal neurologic deficits appreciated   SKIN: No rashes or no palpable lesions        LABS:                                      12.2   7.97  )-----------( 415      ( 12 Jun 2022 06:29 )             37.5     06-12    137  |  98  |  10.7  ----------------------------<  122<H>  4.4   |  28.0  |  1.18    Ca    9.6      12 Jun 2022 06:29    TPro  6.7  /  Alb  3.1<L>  /  TBili  0.6  /  DBili  x   /  AST  11  /  ALT  9   /  AlkPhos  132<H>  06-12        Culture - Blood (collected 10 Gabriel 2022 11:20)  Source: .Blood Blood-Venous  Preliminary Report (11 Jun 2022 12:01):    No growth to date.    Culture - Blood (collected 09 Jun 2022 15:08)  Source: .Blood Blood-Peripheral  Preliminary Report (10 Gabriel 2022 16:01):    No growth to date.      Culture - Blood (06.09.22 @ 15:08)    Gram Stain:   Growth in aerobic bottle: Gram Positive Cocci in Clusters    Specimen Source: .Blood Blood-Peripheral    Culture Results:   Growth in aerobic bottle: Gram Positive Cocci in Clusters        CAPILLARY BLOOD GLUCOSE            RADIOLOGY & ADDITIONAL TESTS:    < from: MR Lumbar Spine No Cont (06.06.22 @ 10:45) >  FINDINGS:    Redemonstrated is abnormal signal at T7/T8 compatible to patient's known   discitis osteomyelitis. Left arm midline ventral flattening at the T7/T8   level causing mild rightward cord compression on image 24 of series 9 is   redemonstrated and measures 8.4 x 7.7 x 30 mm. Posterior epidural   collection at the T7, T8, [id number] best seen on image 8 of series 8   measuring 8.7 cm in its greatest CC dimension.    Abnormal signal changes at the L1/L2 level also concerning for   discitis/osteomyelitis.    Stable left para-aortic cyst measuring 3.8 x 3.2 x 5.5 cm. Bilateral   renal cyst.    Diffuse heterogeneous T1 marrow signal.    IMPRESSION: Grossly stable discitis/osteomyelitis at the T7/T8 level with   ventral and dorsal epidural collections, as above. Stable   discitis/osteomyelitis at L1/L2.    < end of copied text >        < from: CT Thoracic Spine No Cont (06.05.22 @ 10:45) >  IMPRESSION: Endplate erosion T7-8, T8-9 and L1-2 consistent with   osteomyelitis and discitis as on the MRI of the thoracic and lumbar spine   of 6/4/2022. Mild anterior and paraspinal soft tissue prominence at these   levels. The ventral epidural extension and the circumferential epidural   collection seen on the MRI is not appreciated.    < end of copied text >        MEDICATIONS  (STANDING):  atorvastatin 10 milliGRAM(s) Oral at bedtime  ceFAZolin   IVPB 2000 milliGRAM(s) IV Intermittent every 8 hours  diltiazem    milliGRAM(s) Oral daily  losartan 25 milliGRAM(s) Oral daily  metoprolol tartrate 75 milliGRAM(s) Oral two times a day  pantoprazole    Tablet 40 milliGRAM(s) Oral before breakfast  polyethylene glycol 3350 17 Gram(s) Oral daily  rifAMPin 300 milliGRAM(s) Oral two times a day  senna 2 Tablet(s) Oral at bedtime    MEDICATIONS  (PRN):  acetaminophen     Tablet .. 650 milliGRAM(s) Oral every 6 hours PRN Temp greater or equal to 38C (100.4F), Mild Pain (1 - 3)  aluminum hydroxide/magnesium hydroxide/simethicone Suspension 30 milliLiter(s) Oral every 4 hours PRN Dyspepsia  melatonin 3 milliGRAM(s) Oral at bedtime PRN Insomnia  ondansetron Injectable 4 milliGRAM(s) IV Push every 8 hours PRN Nausea and/or Vomiting  oxyCODONE    IR 5 milliGRAM(s) Oral every 4 hours PRN Moderate Pain (4 - 6)  oxyCODONE    IR 10 milliGRAM(s) Oral every 4 hours PRN Severe Pain (7 - 10)

## 2022-06-12 NOTE — PROGRESS NOTE ADULT - ASSESSMENT
62 Y/O male s/p T6-9 multilevel thoracolumbar laminectomy for evacuation of epidural abscess POD#6.      -case discussed w/ Dr. Vicente  -pain control as needed, avoid over sedation  -maintain normotension, systolic  to 150  -SCDs for DVT prophylaxis   -Drain D/C'd 6/10  -continue to follow

## 2022-06-13 LAB
ANION GAP SERPL CALC-SCNC: 12 MMOL/L — SIGNIFICANT CHANGE UP (ref 5–17)
BUN SERPL-MCNC: 9.5 MG/DL — SIGNIFICANT CHANGE UP (ref 8–20)
CALCIUM SERPL-MCNC: 9 MG/DL — SIGNIFICANT CHANGE UP (ref 8.6–10.2)
CHLORIDE SERPL-SCNC: 97 MMOL/L — LOW (ref 98–107)
CO2 SERPL-SCNC: 28 MMOL/L — SIGNIFICANT CHANGE UP (ref 22–29)
CREAT SERPL-MCNC: 1.12 MG/DL — SIGNIFICANT CHANGE UP (ref 0.5–1.3)
EGFR: 74 ML/MIN/1.73M2 — SIGNIFICANT CHANGE UP
GLUCOSE SERPL-MCNC: 113 MG/DL — HIGH (ref 70–99)
HCT VFR BLD CALC: 35.3 % — LOW (ref 39–50)
HGB BLD-MCNC: 11.1 G/DL — LOW (ref 13–17)
MCHC RBC-ENTMCNC: 29.5 PG — SIGNIFICANT CHANGE UP (ref 27–34)
MCHC RBC-ENTMCNC: 31.4 GM/DL — LOW (ref 32–36)
MCV RBC AUTO: 93.9 FL — SIGNIFICANT CHANGE UP (ref 80–100)
PLATELET # BLD AUTO: 405 K/UL — HIGH (ref 150–400)
POTASSIUM SERPL-MCNC: 3.5 MMOL/L — SIGNIFICANT CHANGE UP (ref 3.5–5.3)
POTASSIUM SERPL-SCNC: 3.5 MMOL/L — SIGNIFICANT CHANGE UP (ref 3.5–5.3)
RBC # BLD: 3.76 M/UL — LOW (ref 4.2–5.8)
RBC # FLD: 13.1 % — SIGNIFICANT CHANGE UP (ref 10.3–14.5)
SODIUM SERPL-SCNC: 137 MMOL/L — SIGNIFICANT CHANGE UP (ref 135–145)
WBC # BLD: 7.6 K/UL — SIGNIFICANT CHANGE UP (ref 3.8–10.5)
WBC # FLD AUTO: 7.6 K/UL — SIGNIFICANT CHANGE UP (ref 3.8–10.5)

## 2022-06-13 PROCEDURE — 99232 SBSQ HOSP IP/OBS MODERATE 35: CPT

## 2022-06-13 RX ADMIN — Medication 75 MILLIGRAM(S): at 17:34

## 2022-06-13 RX ADMIN — Medication 100 MILLIGRAM(S): at 06:26

## 2022-06-13 RX ADMIN — OXYCODONE HYDROCHLORIDE 5 MILLIGRAM(S): 5 TABLET ORAL at 01:51

## 2022-06-13 RX ADMIN — ATORVASTATIN CALCIUM 10 MILLIGRAM(S): 80 TABLET, FILM COATED ORAL at 21:04

## 2022-06-13 RX ADMIN — OXYCODONE HYDROCHLORIDE 5 MILLIGRAM(S): 5 TABLET ORAL at 08:25

## 2022-06-13 RX ADMIN — Medication 100 MILLIGRAM(S): at 21:04

## 2022-06-13 RX ADMIN — OXYCODONE HYDROCHLORIDE 5 MILLIGRAM(S): 5 TABLET ORAL at 17:33

## 2022-06-13 RX ADMIN — OXYCODONE HYDROCHLORIDE 10 MILLIGRAM(S): 5 TABLET ORAL at 21:04

## 2022-06-13 RX ADMIN — Medication 240 MILLIGRAM(S): at 06:27

## 2022-06-13 RX ADMIN — Medication 75 MILLIGRAM(S): at 06:27

## 2022-06-13 RX ADMIN — PANTOPRAZOLE SODIUM 40 MILLIGRAM(S): 20 TABLET, DELAYED RELEASE ORAL at 06:27

## 2022-06-13 RX ADMIN — OXYCODONE HYDROCHLORIDE 5 MILLIGRAM(S): 5 TABLET ORAL at 02:30

## 2022-06-13 RX ADMIN — Medication 100 MILLIGRAM(S): at 15:37

## 2022-06-13 RX ADMIN — LOSARTAN POTASSIUM 25 MILLIGRAM(S): 100 TABLET, FILM COATED ORAL at 06:27

## 2022-06-13 RX ADMIN — OXYCODONE HYDROCHLORIDE 5 MILLIGRAM(S): 5 TABLET ORAL at 12:30

## 2022-06-13 NOTE — PROVIDER CONTACT NOTE (CRITICAL VALUE NOTIFICATION) - TEST AND RESULT REPORTED:
Blood culture positive for rare staphylococcus aureus.
Blood culture collected on 6/9/22 Aerobic bottle gram positive cocci in bottle

## 2022-06-13 NOTE — PROGRESS NOTE ADULT - ASSESSMENT
62 y/o M s/p T6-9 multilevel thoracolumbar laminectomy for epidural abscess evacuation POD7.    -Case discussed w/ Dr Vicente.  -Pain control as needed, avoid over sedation  -Maintain normotension, -150  -SCDs for DVT prophylaxis  -Thoracic CRISTIAN drain D/C'd 6/10  -Continue to follow

## 2022-06-13 NOTE — PROGRESS NOTE ADULT - SUBJECTIVE AND OBJECTIVE BOX
[facility]                                                INFECTIOUS DISEASES  =======================================================                               Chance Boggs MD#  Luis Manuel Anand MD*                                     Brielle De La Rosa MD*    Dania Villarreal MD*            Diplomates American Board of Internal Medicine & Infectious Diseases                  # [place] Office - Appt - Tel  [phone] Fax [phone]                * [place] Office - Appt - Tel [phone] Fax [phone]                                  Hospital Consult line:  [phone]  =======================================================    [id number]  DIANA JACKSONNTI  follow up:  MSSA infection, epidural abscess    repeat blood cultures from 6/9/22 1 of 2 sets positive.   repeat in process.   no fevers  interval drain removal    I have personally reviewed the labs and data; pertinent labs and data are listed in this note; please see below.   =======================================================  Past Medical & Surgical Hx:  =====================  PAST MEDICAL & SURGICAL HISTORY:    Problem List:  ==========  HEALTH ISSUES - PROBLEM Dx:  Preop cardiovascular exam  History of atrial fibrillation  HTN (hypertension)      Social Hx:  =======  no toxic habits currently    FAMILY HISTORY:  no significant family history of immunosuppressive disorders in mother or father   =======================================================    REVIEW OF SYSTEMS:  CONSTITUTIONAL:  No Fever or chills  HEENT:  No diplopia or blurred vision.  No earache, sore throat or runny nose.  CARDIOVASCULAR:  No pressure, squeezing, strangling, tightness, heaviness or aching about the chest, neck, axilla or epigastrium.  RESPIRATORY:  No cough, shortness of breath  GASTROINTESTINAL:  No nausea, vomiting or diarrhea.  GENITOURINARY:  No dysuria, frequency or urgency. No Blood in urine  MUSCULOSKELETAL:  BACK PAIN  SKIN:  No change in skin, hair or nails.  NEUROLOGIC:  No Headaches, seizures or weakness.  PSYCHIATRIC:  No disorder of thought or mood.  ENDOCRINE:  No heat or cold intolerance  HEMATOLOGICAL:  No easy bruising or bleeding.    =======================================================  Allergies  No Known Allergies     ======================================================  Physical Exam:  ============     General:  No acute distress.  Eye: Pupils are equal, round and reactive to light, Extraocular movements are intact, Normal conjunctiva.  HENT: Normocephalic, Oral mucosa is moist, No pharyngeal erythema, No sinus tenderness.  Neck: Supple, No lymphadenopathy.  Respiratory: Lungs are clear to auscultation, Respirations are non-labored.  Cardiovascular: Normal rate, Regular rhythm,   Gastrointestinal: Soft, Non-tender, Non-distended, Normal bowel sounds.  Genitourinary: No costovertebral angle tenderness.  Lymphatics: No lymphadenopathy neck,   Musculoskeletal:  mid thoracic spine incision with staples in place. clean. drain removed  Integumentary: No rash.  Neurologic: Alert, Oriented, No focal deficits, Cranial Nerves II-XII are grossly intact.  Psychiatric: Appropriate mood & affect.    =======================================================   Vitals:  ============  T(F): 97.9 (13 Jun 2022 04:56), Max: 98.5 (12 Jun 2022 18:08)  HR: 84 (13 Jun 2022 04:56)  BP: 119/83 (13 Jun 2022 04:56)  RR: 18 (13 Jun 2022 04:56)  SpO2: 94% (13 Jun 2022 04:56) (94% - 96%)  temp max in last 48H T(F): , Max: 98.9 (06-11-22 @ 16:15)    =======================================================  Current Antibiotics:  ceFAZolin   IVPB 2000 milliGRAM(s) IV Intermittent every 8 hours  rifAMPin 300 milliGRAM(s) Oral two times a day    Other medications:  atorvastatin 10 milliGRAM(s) Oral at bedtime  diltiazem    milliGRAM(s) Oral daily  losartan 25 milliGRAM(s) Oral daily  metoprolol tartrate 75 milliGRAM(s) Oral two times a day  pantoprazole    Tablet 40 milliGRAM(s) Oral before breakfast  polyethylene glycol 3350 17 Gram(s) Oral daily  senna 2 Tablet(s) Oral at bedtime      =======================================================  Labs:                        11.1   7.60  )-----------( 405      ( 13 Jun 2022 06:53 )             35.3     06-13    137  |  97<L>  |  9.5  ----------------------------<  113<H>  3.5   |  28.0  |  1.12    Ca    9.0      13 Jun 2022 06:53    TPro  6.7  /  Alb  3.1<L>  /  TBili  0.6  /  DBili  x   /  AST  11  /  ALT  9   /  AlkPhos  132<H>  06-12      Culture - Blood (collected 06-10-22 @ 11:20)  Source: .Blood Blood-Venous    Culture - Blood (collected 06-09-22 @ 15:08)  Source: .Blood Blood-Peripheral  Gram Stain (06-12-22 @ 04:52):    Growth in aerobic bottle: Gram Positive Cocci in Clusters  Organism: Blood Culture PCR (06-12-22 @ 18:59)  Organism: Blood Culture PCR (06-12-22 @ 18:59)    Sensitivities:      -  Methicillin SENSITIVE Staphylococcus aureus (MSSA): Detec Any isolate of Staphylococcus aureus from a blood culture is NOT considered a contaminant.      Method Type: PCR    Culture - Blood (collected 06-09-22 @ 15:08)  Source: .Blood Blood-Peripheral    Culture - Blood (collected 06-07-22 @ 16:58)  Source: .Blood Blood-Peripheral  Final Report (06-12-22 @ 17:00):    No Growth Final    Culture - Blood (collected 06-07-22 @ 16:58)  Source: .Blood Blood-Peripheral  Gram Stain (06-08-22 @ 21:50):    Growth in aerobic bottle: Gram Positive Cocci in Clusters  Final Report (06-09-22 @ 16:51):    Growth in aerobic bottle: Staphylococcus aureus    See previous culture [id number]    Culture - Acid Fast - Tissue w/Smear (collected 06-07-22 @ 03:39)  Source: .Tissue epidural phlegmon    Culture - Tissue with Gram Stain (collected 06-07-22 @ 02:26)  Source: .Tissue epidural phlegmon  Gram Stain (06-07-22 @ 03:24):    No polymorphonuclear leukocytes seen per low power field    No organisms seen per oil power field  Final Report (06-11-22 @ 15:02):    Rare Staphylococcus aureus  Organism: Staphylococcus aureus (06-11-22 @ 15:02)  Organism: Staphylococcus aureus (06-11-22 @ 15:02)    Sensitivities:      -  Ampicillin/Sulbactam: S <=8/4      -  Cefazolin: S <=4      -  Clindamycin: R 0.5 This isolate is presumed to be clindamycin resistant based on detection of inducible resistance. Clindamycin may still be effective in some patients.      -  Erythromycin: R >4      -  Gentamicin: S <=1 Should not be used as monotherapy      -  Oxacillin: S 1 Oxacillin predicts susceptibility for dicloxacillin, methicillin, and nafcillin      -  Penicillin: R >8      -  Rifampin: S <=1 Should not be used as monotherapy      -  Tetra/Doxy: S <=1      -  Trimethoprim/Sulfamethoxazole: S <=0.5/9.5      -  Vancomycin: S 2      Method Type: JOVANI    Culture - Surgical Swab (collected 06-07-22 @ 02:15)  Source: .Surgical Swab thoracic epidural (swab)  Final Report (06-11-22 @ 15:38):    Rare Staphylococcus aureus  Organism: Staphylococcus aureus (06-11-22 @ 15:38)  Organism: Staphylococcus aureus (06-11-22 @ 15:38)    Sensitivities:      -  Ampicillin/Sulbactam: S <=8/4      -  Cefazolin: S <=4      -  Clindamycin: R <=0.25 This isolate is presumed to be clindamycin resistant based on detection of inducible resistance. Clindamycin may still be effective in some patients.      -  Erythromycin: R >4      -  Gentamicin: S <=1 Should not be used as monotherapy      -  Oxacillin: S 0.5 Oxacillin predicts susceptibility for dicloxacillin, methicillin, and nafcillin      -  Penicillin: R >8      -  Rifampin: S <=1 Should not be used as monotherapy      -  Tetra/Doxy: S <=1      -  Trimethoprim/Sulfamethoxazole: S <=0.5/9.5      -  Vancomycin: S 2      Method Type: JOVANI    Culture - Blood (collected 06-05-22 @ 02:06)  Source: .Blood Blood-Peripheral  Gram Stain (06-07-22 @ 09:08):    Growth in anaerobic bottle: Gram Positive Cocci in Clusters    ***Blood Panel PCR results on this specimen are available    approximately 3 hours after the Gram stain result.***    Gram stain, PCR, and/or culture results may not always    correspond due todifference in methodologies.    ************************************************************    This PCR assay was performed using [employer].    The following targets are tested for: Enterococcus,    vancomycin resistant enterococci, Listeria monocytogenes,    coagulase negative staphylococci, S. aureus,    methicillin resistant S. aureus, Streptococcus agalactiae    (Group B), S. pneumoniae, S. pyogenes (Group A),    Acinetobacter baumannii, Enterobacter cloacae, E. coli,    Klebsiella oxytoca, K. pneumoniae, Proteus sp.,    Serratia marcescens, Haemophilus influenzae,    Neisseria meningitidis, Pseudomonas aeruginosa, Candida    albicans, C. glabrata, C krusei, C parapsilosis,    C. tropicalis and the KPC resistance gene.    Gram Stain and BCID performed by:    [facility]    [address] [place] [id number]    .    TYPE: (C=Critical, N=Notification, A=Abnormal) C    TESTS:  _ GS    DATE/TIME CALLED: _ 06/07/2022 09:07:29    CALLED TO: Johnie Up RN    READ BACK (2 Patient Identifiers)(Y/N): _ Y    READ BACK VALUES (Y/N): _ Y    CALLED BY: Johnie Gardner  Final Report (06-09-22 @ 08:52):    Growth in anaerobic bottle: Staphylococcus aureus  Organism: Blood Culture PCR  Staphylococcus aureus (06-09-22 @ 16:50)  Organism: Staphylococcus aureus (06-09-22 @ 16:50)    Sensitivities:      -  Ampicillin/Sulbactam: S <=8/4      -  Cefazolin: S <=4      -  Clindamycin: R <=0.25 This isolate is presumed to be clindamycin resistant based on detection of inducible resistance. Clindamycin may still be effective in some patients.      -  Erythromycin: R >4      -  Gentamicin: S <=1 Should not be used as monotherapy      -  Oxacillin: S 0.5 Oxacillin predicts susceptibility for dicloxacillin, methicillin, and nafcillin      -  Penicillin: R >8      -  Rifampin: S <=1 Should not be used as monotherapy      -  Tetra/Doxy: S <=1      -  Trimethoprim/Sulfamethoxazole: S <=0.5/9.5      -  Vancomycin: S 1      Method Type: JOVANI  Organism: Blood Culture PCR (06-09-22 @ 08:52)    Sensitivities:      -  Methicillin SENSITIVE Staphylococcus aureus (MSSA): Detec Any isolate of Staphylococcus aureus from a blood culture is NOT considered a contaminant.      Method Type: PCR    Culture - Blood (collected 06-05-22 @ 02:05)  Source: .Blood Blood-Peripheral  Final Report (06-10-22 @ 03:00):    No growth at 5 days.      C-Reactive Protein, Serum: 23 mg/L (06-05-22 @ 02:05)  Sedimentation Rate, Erythrocyte: 24 mm/hr (06-05-22 @ 02:05)  Procalcitonin, Serum: 0.12 ng/mL (06-05-22 @ 02:05)    COVID-19 PCR: NotDetec (06-12-22 @ 16:44)     COVID-19 PCR: NotDetec (06-05-22 @ 17:07)

## 2022-06-13 NOTE — PROGRESS NOTE ADULT - ASSESSMENT
This 61M with HTN, DVT/PE, Testicular CA s/p chemo/radiation/LN Dissection c/o SOB/RICKETTS and cough admitted to [facility] for Acute Hypoxic Respiratory Failure 2/2 COVID PNA. Treated with steroids and remdesivir. Found to have MSSA bacteremia treated with Ancef. Subsequently c/o low back pain had MRI WO which noted diskitis/osteomyelitis and epidural abscess. Patient evaluated by Neurosurgery at [place] who recommended IR intervention and transfer to [facility]. Neurosurgery consulted. Pt seen/examined. Denies current complaints. Doing well. ROS negative. Neuro exam intact. Discussed with RN.     PMHX: HTN, DVT/PE, Testicular CA s/p chemo/radiation/LN Dissection  PSHX: Radical Orchiectomy, Hip Replacement, Hernia Repair  FamHx: +Mother CA +Mother DM2, +Father CAD  Social Hx: Former Smoker quit 20 years ago  NKDA (05 Jun 2022 02:52)    patient has been having back pain; recalls episode of severe back pain in a [employer]D movies experience of [place] in 10/2021. he went to see a spine Dr. In [place] Who dx him with arthritis of the back and treated with pain meds.   about 2 weeks ago, he developed a boil in his RIGHT groin. He denies squeezing at it, but stated that it went away as quick as it came.     Denies IVDU.   no other skin conditions to report.     MRI and CT scan from [facility] reviewed.   MSSA identified in blood cultures on 5/29/22 x 2 sets  but cleared on 5/31/22 x 2 sets, and 6/1/22 x 2 sets.       Impression:  WBC elevation  MSSA bacteremia  Spine Osteomyelitis  thoracic spine abscess  back pan      Plan:  - neurosurgical team  following  s/p T8-T9 laminectomy 6/6/22; 2 grams of vanco powder placed in the wound per note.   OR cultures in were positive     waiting for negative blood cultures,.  most recent positive from 6/9/22    - continue Cefazolin 2 grams Q8H  continue Rifampin 300mg PO BID, started on 6/9/22      PICC Line only after negative blood cx.   PICC Line because of at least 6 weeks of IV antibiotics.   midline is NOT an alternative.    WBC elevation is reactive  - will follow and trend    control of back pain per primary team        regarding COVID-19 testing; repeat negative  not symptomatic   defer treatment.

## 2022-06-13 NOTE — DIETITIAN INITIAL EVALUATION ADULT - ADD RECOMMEND
Encouraged high fiber foods for constipation. Encouraged HBV protein sources, high fiber foods for constipation.  Rec MVI daily

## 2022-06-13 NOTE — PROGRESS NOTE ADULT - SUBJECTIVE AND OBJECTIVE BOX
Chief Complaint:  epidural abscess    SUBJECTIVE / OVERNIGHT EVENTS: No acute events reported overnight.  Patient denies chest pain, SOB, abd pain, N/V, fever, chills, dysuria or any other complaints. All remainder ROS negative.       I&O's Summary    10 Gabriel 2022 07:01  -  11 Jun 2022 07:00  --------------------------------------------------------  IN: 0 mL / OUT: 600 mL / NET: -600 mL    11 Jun 2022 07:01  -  11 Jun 2022 12:47  --------------------------------------------------------  IN: 0 mL / OUT: 700 mL / NET: -700 mL          PHYSICAL EXAM:  Vital Signs Last 24 Hrs  T(C): 36.9 (13 Jun 2022 11:29), Max: 36.9 (12 Jun 2022 18:08)  T(F): 98.4 (13 Jun 2022 11:29), Max: 98.5 (12 Jun 2022 18:08)  HR: 74 (13 Jun 2022 11:29) (74 - 96)  BP: 106/70 (13 Jun 2022 11:29) (104/71 - 119/83)  BP(mean): --  RR: 18 (13 Jun 2022 11:29) (18 - 18)  SpO2: 95% (13 Jun 2022 11:29) (94% - 96%)      GENERAL: pt examined bedside, laying comfortably in bed in NAD  HEENT: NC/AT, moist oral mucosa, clear conjunctiva, sclera nonicteric  RESPIRATORY: Normal respiratory effort; CTA b/l, no wheezing, rhonchi, rales  CARDIOVASCULAR: RRR, normal S1 and S2, no murmur/rub/gallop  ABDOMEN: soft, NT/ND, normoactive bowel sounds, no rebound/guarding  MUSCLOSKELETAL:  spinal dressing CDI   EXTREMITIES: No cynaosis, no clubbing, no lower extremity edema; Peripheral pulses are 2+ bilaterally  PSYCH: affect appropriate and cooperative  NEUROLOGY: A+O to person, place, and time, decreased plantar sensation (chronic), no focal neurologic deficits appreciated   SKIN: No rashes or no palpable lesions        LABS:                                            11.1   7.60  )-----------( 405      ( 13 Jun 2022 06:53 )             35.3       06-13    137  |  97<L>  |  9.5  ----------------------------<  113<H>  3.5   |  28.0  |  1.12    Ca    9.0      13 Jun 2022 06:53    TPro  6.7  /  Alb  3.1<L>  /  TBili  0.6  /  DBili  x   /  AST  11  /  ALT  9   /  AlkPhos  132<H>  06-12      Culture - Blood (collected 10 Gabriel 2022 11:20)  Source: .Blood Blood-Venous  Preliminary Report (11 Jun 2022 12:01):    No growth to date.    Culture - Blood (collected 09 Jun 2022 15:08)  Source: .Blood Blood-Peripheral  Preliminary Report (10 Gabriel 2022 16:01):    No growth to date.      Culture - Blood (06.09.22 @ 15:08)    Gram Stain:   Growth in aerobic bottle: Gram Positive Cocci in Clusters    Specimen Source: .Blood Blood-Peripheral    Culture Results:   Growth in aerobic bottle: Gram Positive Cocci in Clusters        CAPILLARY BLOOD GLUCOSE            RADIOLOGY & ADDITIONAL TESTS:    < from: MR Lumbar Spine No Cont (06.06.22 @ 10:45) >  FINDINGS:    Redemonstrated is abnormal signal at T7/T8 compatible to patient's known   discitis osteomyelitis. Left arm midline ventral flattening at the T7/T8   level causing mild rightward cord compression on image 24 of series 9 is   redemonstrated and measures 8.4 x 7.7 x 30 mm. Posterior epidural   collection at the T7, T8, H7ehwmit best seen on image 8 of series 8   measuring 8.7 cm in its greatest CC dimension.    Abnormal signal changes at the L1/L2 level also concerning for   discitis/osteomyelitis.    Stable left para-aortic cyst measuring 3.8 x 3.2 x 5.5 cm. Bilateral   renal cyst.    Diffuse heterogeneous T1 marrow signal.    IMPRESSION: Grossly stable discitis/osteomyelitis at the T7/T8 level with   ventral and dorsal epidural collections, as above. Stable   discitis/osteomyelitis at L1/L2.    < end of copied text >        < from: CT Thoracic Spine No Cont (06.05.22 @ 10:45) >  IMPRESSION: Endplate erosion T7-8, T8-9 and L1-2 consistent with   osteomyelitis and discitis as on the MRI of the thoracic and lumbar spine   of 6/4/2022. Mild anterior and paraspinal soft tissue prominence at these   levels. The ventral epidural extension and the circumferential epidural   collection seen on the MRI is not appreciated.    < end of copied text >        MEDICATIONS  (STANDING):  atorvastatin 10 milliGRAM(s) Oral at bedtime  ceFAZolin   IVPB 2000 milliGRAM(s) IV Intermittent every 8 hours  diltiazem    milliGRAM(s) Oral daily  losartan 25 milliGRAM(s) Oral daily  metoprolol tartrate 75 milliGRAM(s) Oral two times a day  pantoprazole    Tablet 40 milliGRAM(s) Oral before breakfast  polyethylene glycol 3350 17 Gram(s) Oral daily  rifAMPin 300 milliGRAM(s) Oral two times a day  senna 2 Tablet(s) Oral at bedtime    MEDICATIONS  (PRN):  acetaminophen     Tablet .. 650 milliGRAM(s) Oral every 6 hours PRN Temp greater or equal to 38C (100.4F), Mild Pain (1 - 3)  aluminum hydroxide/magnesium hydroxide/simethicone Suspension 30 milliLiter(s) Oral every 4 hours PRN Dyspepsia  melatonin 3 milliGRAM(s) Oral at bedtime PRN Insomnia  ondansetron Injectable 4 milliGRAM(s) IV Push every 8 hours PRN Nausea and/or Vomiting  oxyCODONE    IR 5 milliGRAM(s) Oral every 4 hours PRN Moderate Pain (4 - 6)  oxyCODONE    IR 10 milliGRAM(s) Oral every 4 hours PRN Severe Pain (7 - 10)

## 2022-06-13 NOTE — PROGRESS NOTE ADULT - SUBJECTIVE AND OBJECTIVE BOX
HPI:  60 y/o male w/ Hx of HTN, DVT/PE, Testicular CA s/p chemo/radiation, transferred to [facility] from [facility] where he was treated for hypoxic respiratory failure 2/2 COVID, found to have MSSA bacteremia, c/o back pain, had imaging which showed T6-10 phlegmon and epidural abscess T7-8. Pt now s/p T6-9 multilevel thoracolumbar laminectomy for evacuation of epidural abscess POD7.    PMHX: HTN, DVT/PE, Testicular CA s/p chemo/radiation/LN Dissection  PSHX: Radical Orchiectomy, Hip Replacement, Hernia Repair  FamHx: +Mother CA +Mother DM2, +Father CAD  Social Hx: Former Smoker quit 20 years ago  NKDA (05 Jun 2022 02:52)      INTERVAL HPI/OVERNIGHT EVENTS:  61y Male s/p T6-9 thoracolumbar laminectomy for epidural abscess evacuation POD7 seen lying comfortably in bed. Tolerating diet. Passing gas/BM. Voiding. Patient is ambulating OOB. Denies dizziness, unsteady gait, headache, weakness, numbness, n/v/d, fevers, chills, chest pain, SOB.     Vital Signs Last 24 Hrs  T(C): 36.6 (13 Jun 2022 04:56), Max: 36.9 (12 Jun 2022 18:08)  T(F): 97.9 (13 Jun 2022 04:56), Max: 98.5 (12 Jun 2022 18:08)  HR: 84 (13 Jun 2022 04:56) (77 - 96)  BP: 119/83 (13 Jun 2022 04:56) (104/71 - 119/83)  BP(mean): --  RR: 18 (13 Jun 2022 04:56) (18 - 18)  SpO2: 94% (13 Jun 2022 04:56) (94% - 96%)    PHYSICAL EXAM:  GENERAL: NAD, well-groomed  HEAD:  Atraumatic, normocephalic  WOUND: Primary dressing appropriately soiled serosanguinous drainage  DOMINICK COMA SCORE: 15  MENTAL STATUS: AAO x3; Awake/Comatose; Opens eyes spontaneously; Appropriately conversant without aphasia; following simple commands  REFLEXES: PERRL. Corneals intact b/l.  MOTOR: Delt, bicep, tricep, hand , finger abduction strength 5/5 b/l. Hip flexion, knee extension, dorsiflexion, plantar flexion strength 5/5 b/l.  SENSATION: Grossly intact to light touch all extremities and entire torso.  COORDINATION: Gait and one leg balance intact  EXTREMITIES:  No clubbing, cyanosis, calf tenderness, or edema b/l  SKIN: Warm, dry; no rashes or lesions    LABS:                        11.1   7.60  )-----------( 405      ( 13 Jun 2022 06:53 )             35.3     06-13    137  |  97<L>  |  9.5  ----------------------------<  113<H>  3.5   |  28.0  |  1.12    Ca    9.0      13 Jun 2022 06:53    TPro  6.7  /  Alb  3.1<L>  /  TBili  0.6  /  DBili  x   /  AST  11  /  ALT  9   /  AlkPhos  132<H>  06-12 06-12 @ 07:01  -  06-13 @ 07:00  --------------------------------------------------------  IN: 0 mL / OUT: 1330 mL / NET: -1330 mL        RADIOLOGY & ADDITIONAL TESTS:   HPI:  60 y/o male w/ Hx of HTN, DVT/PE, Testicular CA s/p chemo/radiation, transferred to [facility] from [facility] where he was treated for hypoxic respiratory failure 2/2 COVID, found to have MSSA bacteremia, c/o back pain, had imaging which showed T6-10 phlegmon and epidural abscess T7-8. Pt now s/p T6-9 multilevel thoracolumbar laminectomy for evacuation of epidural abscess POD7.    PMHX: HTN, DVT/PE, Testicular CA s/p chemo/radiation/LN Dissection  PSHX: Radical Orchiectomy, Hip Replacement, Hernia Repair  FamHx: +Mother CA +Mother DM2, +Father CAD  Social Hx: Former Smoker quit 20 years ago  NKDA (05 Jun 2022 02:52)      INTERVAL HPI/OVERNIGHT EVENTS:  61y Male s/p T6-9 thoracolumbar laminectomy for epidural abscess evacuation POD7 seen lying comfortably in bed. Tolerating diet. Passing gas/BM. Voiding. Patient is ambulating OOB. Denies dizziness, unsteady gait, headache, weakness, numbness, n/v/d, fevers, chills, chest pain, SOB.     Vital Signs Last 24 Hrs  T(C): 36.6 (13 Jun 2022 04:56), Max: 36.9 (12 Jun 2022 18:08)  T(F): 97.9 (13 Jun 2022 04:56), Max: 98.5 (12 Jun 2022 18:08)  HR: 84 (13 Jun 2022 04:56) (77 - 96)  BP: 119/83 (13 Jun 2022 04:56) (104/71 - 119/83)  BP(mean): --  RR: 18 (13 Jun 2022 04:56) (18 - 18)  SpO2: 94% (13 Jun 2022 04:56) (94% - 96%)      PHYSICAL EXAM:  GENERAL: NAD, well-groomed  HEAD:  Atraumatic, normocephalic  WOUND: Primary dressing appropriately soiled serosanguinous drainage  DOMINICK COMA SCORE: 15  MENTAL STATUS: AAO x3; Awake. Opens eyes spontaneously; Appropriately conversant without aphasia; following simple commands  REFLEXES: PERRL.   MOTOR: Delt, bicep, tricep, hand , finger abduction strength 5/5 b/l. Hip flexion, knee extension, dorsiflexion, plantar flexion strength 5/5 b/l.  SENSATION: Grossly intact to light touch all extremities and entire torso.  SKIN: Warm, dry; no rashes or lesions      LABS:                        11.1   7.60  )-----------( 405      ( 13 Jun 2022 06:53 )             35.3     06-13    137  |  97<L>  |  9.5  ----------------------------<  113<H>  3.5   |  28.0  |  1.12    Ca    9.0      13 Jun 2022 06:53    TPro  6.7  /  Alb  3.1<L>  /  TBili  0.6  /  DBili  x   /  AST  11  /  ALT  9   /  AlkPhos  132<H>  06-12          06-12 @ 07:01  -  06-13 @ 07:00  --------------------------------------------------------  IN: 0 mL / OUT: 1330 mL / NET: -1330 mL

## 2022-06-13 NOTE — PROGRESS NOTE ADULT - ASSESSMENT
61 jy/o M w/ PMH of HTN, DVT/PE, Testicular CA s/p chemo/radiation/LN Dissection was admitted to  for Acute Hypoxic Respiratory Failure 2/2 COVID PNA w/ hospital course complicated by MSSA Bacteremia subsequently found to have Diskitis/Osteomyelitis and Epidural Abscess for which he was transferred to [facility] for IR and Neurosurgical evaluation/intervention.  Pt underwent laminectomy with abscess drainage and laminectomy on 06/06.       Diskitis/Osteomyelitis/Epidural Abscess r/o Cord Compression/Cauda Equina c/b MSSA bacteremia  - s/p Laminectomy POD6  - Bcx x2 from 06/09 had been NGTD however 06/12 one of the bottles growing G(+) cocci in clusters w/ MSSA on PCR, Bcx from 06/10 NGTD   - Discussed w/ ID and subsequently repeat Bcxs x2 ordered 06/12   - Picc line order canceled pending negative repeat blood cultures    - Leukocytosis resolved, pt remains afebrile and nontoxic appearing   - Maintain on IV antibiotics   - Neurosurgery and ID following and recs noted       Acute Hypoxic Resp Failure 2/2 COVID PNA  - Vaccinated x2  - CTA Chest negative for PE  - Completed Remdesivir at [facility]  - Currently on room air   - Isolation d/c'd per ID recs       HTN / HLD  - Cardizem 240mg PO q24  - Metoprolol Tartrate 75mg PO BID  - Losartan 25mg PO q24      Testicular CA s/p chemo/radiation/LN Dissection  - PETSCAN 6-7 mos ago negative for malignancy  - CT ABD findings likely known Lymphocele / postsurgical changes from prior LN dissection  - Outpatient F/u w/ [facility]       VTE ppx: SCDs for now given recent laminectomy     Dispo: Needs picc line prior to d/c for prolonged course of IV antibiotics.

## 2022-06-13 NOTE — DIETITIAN INITIAL EVALUATION ADULT - PERTINENT MEDS FT
MEDICATIONS  (STANDING):  atorvastatin 10 milliGRAM(s) Oral at bedtime  ceFAZolin   IVPB 2000 milliGRAM(s) IV Intermittent every 8 hours  diltiazem    milliGRAM(s) Oral daily  losartan 25 milliGRAM(s) Oral daily  metoprolol tartrate 75 milliGRAM(s) Oral two times a day  pantoprazole    Tablet 40 milliGRAM(s) Oral before breakfast  polyethylene glycol 3350 17 Gram(s) Oral daily  rifAMPin 300 milliGRAM(s) Oral two times a day  senna 2 Tablet(s) Oral at bedtime    MEDICATIONS  (PRN):  acetaminophen     Tablet .. 650 milliGRAM(s) Oral every 6 hours PRN Temp greater or equal to 38C (100.4F), Mild Pain (1 - 3)  aluminum hydroxide/magnesium hydroxide/simethicone Suspension 30 milliLiter(s) Oral every 4 hours PRN Dyspepsia  benzocaine 15 mG/menthol 3.6 mG Lozenge 1 Lozenge Oral three times a day PRN Sore Throat  melatonin 3 milliGRAM(s) Oral at bedtime PRN Insomnia  ondansetron Injectable 4 milliGRAM(s) IV Push every 8 hours PRN Nausea and/or Vomiting  oxyCODONE    IR 5 milliGRAM(s) Oral every 4 hours PRN Moderate Pain (4 - 6)  oxyCODONE    IR 10 milliGRAM(s) Oral every 4 hours PRN Severe Pain (7 - 10)

## 2022-06-13 NOTE — DIETITIAN INITIAL EVALUATION ADULT - PERTINENT LABORATORY DATA
06-13    137  |  97<L>  |  9.5  ----------------------------<  113<H>  3.5   |  28.0  |  1.12    Ca    9.0      13 Jun 2022 06:53    TPro  6.7  /  Alb  3.1<L>  /  TBili  0.6  /  DBili  x   /  AST  11  /  ALT  9   /  AlkPhos  132<H>  06-12

## 2022-06-13 NOTE — DIETITIAN INITIAL EVALUATION ADULT - ORAL INTAKE PTA/DIET HISTORY
Pt with good po intake, pt c/o constipation. # then intentionally lost weight to 202#, now pt 196#. Pt had been having issues with getting what he requests. Feels like food gets wasted.

## 2022-06-13 NOTE — DIETITIAN INITIAL EVALUATION ADULT - OTHER INFO
60 y/o M w/ PMH of HTN, DVT/PE, Testicular CA s/p chemo/radiation/LN Dissection was admitted to  for Acute Hypoxic Respiratory Failure 2/2 COVID PNA w/ hospital course complicated by MSSA Bacteremia subsequently found to have Diskitis/Osteomyelitis and Epidural Abscess for which he was transferred to [facility] for IR and Neurosurgical evaluation/intervention.  Pt underwent laminectomy with abscess drainage and laminectomy on 06/06.

## 2022-06-14 LAB
-  AMPICILLIN/SULBACTAM: SIGNIFICANT CHANGE UP
-  CEFAZOLIN: SIGNIFICANT CHANGE UP
-  CLINDAMYCIN: SIGNIFICANT CHANGE UP
-  ERYTHROMYCIN: SIGNIFICANT CHANGE UP
-  GENTAMICIN: SIGNIFICANT CHANGE UP
-  OXACILLIN: SIGNIFICANT CHANGE UP
-  PENICILLIN: SIGNIFICANT CHANGE UP
-  RIFAMPIN: SIGNIFICANT CHANGE UP
-  TETRACYCLINE: SIGNIFICANT CHANGE UP
-  TRIMETHOPRIM/SULFAMETHOXAZOLE: SIGNIFICANT CHANGE UP
-  VANCOMYCIN: SIGNIFICANT CHANGE UP
ANION GAP SERPL CALC-SCNC: 12 MMOL/L — SIGNIFICANT CHANGE UP (ref 5–17)
BUN SERPL-MCNC: 9.9 MG/DL — SIGNIFICANT CHANGE UP (ref 8–20)
CALCIUM SERPL-MCNC: 9.4 MG/DL — SIGNIFICANT CHANGE UP (ref 8.6–10.2)
CHLORIDE SERPL-SCNC: 97 MMOL/L — LOW (ref 98–107)
CO2 SERPL-SCNC: 28 MMOL/L — SIGNIFICANT CHANGE UP (ref 22–29)
CREAT SERPL-MCNC: 1.04 MG/DL — SIGNIFICANT CHANGE UP (ref 0.5–1.3)
CULTURE RESULTS: SIGNIFICANT CHANGE UP
CULTURE RESULTS: SIGNIFICANT CHANGE UP
EGFR: 81 ML/MIN/1.73M2 — SIGNIFICANT CHANGE UP
GLUCOSE SERPL-MCNC: 132 MG/DL — HIGH (ref 70–99)
HCT VFR BLD CALC: 35.5 % — LOW (ref 39–50)
HGB BLD-MCNC: 11.4 G/DL — LOW (ref 13–17)
MCHC RBC-ENTMCNC: 30.2 PG — SIGNIFICANT CHANGE UP (ref 27–34)
MCHC RBC-ENTMCNC: 32.1 GM/DL — SIGNIFICANT CHANGE UP (ref 32–36)
MCV RBC AUTO: 93.9 FL — SIGNIFICANT CHANGE UP (ref 80–100)
METHOD TYPE: SIGNIFICANT CHANGE UP
ORGANISM # SPEC MICROSCOPIC CNT: SIGNIFICANT CHANGE UP
PLATELET # BLD AUTO: 411 K/UL — HIGH (ref 150–400)
POTASSIUM SERPL-MCNC: 3.9 MMOL/L — SIGNIFICANT CHANGE UP (ref 3.5–5.3)
POTASSIUM SERPL-SCNC: 3.9 MMOL/L — SIGNIFICANT CHANGE UP (ref 3.5–5.3)
RBC # BLD: 3.78 M/UL — LOW (ref 4.2–5.8)
RBC # FLD: 13 % — SIGNIFICANT CHANGE UP (ref 10.3–14.5)
SODIUM SERPL-SCNC: 137 MMOL/L — SIGNIFICANT CHANGE UP (ref 135–145)
SPECIMEN SOURCE: SIGNIFICANT CHANGE UP
SPECIMEN SOURCE: SIGNIFICANT CHANGE UP
WBC # BLD: 6.59 K/UL — SIGNIFICANT CHANGE UP (ref 3.8–10.5)
WBC # FLD AUTO: 6.59 K/UL — SIGNIFICANT CHANGE UP (ref 3.8–10.5)

## 2022-06-14 PROCEDURE — 71045 X-RAY EXAM CHEST 1 VIEW: CPT | Mod: 26

## 2022-06-14 PROCEDURE — 99232 SBSQ HOSP IP/OBS MODERATE 35: CPT

## 2022-06-14 RX ORDER — CHLORHEXIDINE GLUCONATE 213 G/1000ML
1 SOLUTION TOPICAL
Refills: 0 | Status: DISCONTINUED | OUTPATIENT
Start: 2022-06-14 | End: 2022-06-15

## 2022-06-14 RX ORDER — SODIUM CHLORIDE 9 MG/ML
10 INJECTION INTRAMUSCULAR; INTRAVENOUS; SUBCUTANEOUS
Refills: 0 | Status: DISCONTINUED | OUTPATIENT
Start: 2022-06-14 | End: 2022-06-15

## 2022-06-14 RX ORDER — OXYCODONE HYDROCHLORIDE 5 MG/1
10 TABLET ORAL ONCE
Refills: 0 | Status: DISCONTINUED | OUTPATIENT
Start: 2022-06-14 | End: 2022-06-14

## 2022-06-14 RX ORDER — OXYCODONE HYDROCHLORIDE 5 MG/1
5 TABLET ORAL EVERY 4 HOURS
Refills: 0 | Status: DISCONTINUED | OUTPATIENT
Start: 2022-06-14 | End: 2022-06-15

## 2022-06-14 RX ORDER — CEFAZOLIN SODIUM 1 G
2 VIAL (EA) INJECTION
Qty: 228 | Refills: 0
Start: 2022-06-14 | End: 2022-07-21

## 2022-06-14 RX ORDER — OXYCODONE HYDROCHLORIDE 5 MG/1
10 TABLET ORAL EVERY 4 HOURS
Refills: 0 | Status: DISCONTINUED | OUTPATIENT
Start: 2022-06-14 | End: 2022-06-15

## 2022-06-14 RX ADMIN — ATORVASTATIN CALCIUM 10 MILLIGRAM(S): 80 TABLET, FILM COATED ORAL at 21:09

## 2022-06-14 RX ADMIN — Medication 100 MILLIGRAM(S): at 21:09

## 2022-06-14 RX ADMIN — Medication 100 MILLIGRAM(S): at 05:29

## 2022-06-14 RX ADMIN — Medication 240 MILLIGRAM(S): at 05:30

## 2022-06-14 RX ADMIN — Medication 100 MILLIGRAM(S): at 13:41

## 2022-06-14 RX ADMIN — Medication 75 MILLIGRAM(S): at 05:29

## 2022-06-14 RX ADMIN — CHLORHEXIDINE GLUCONATE 1 APPLICATION(S): 213 SOLUTION TOPICAL at 13:41

## 2022-06-14 RX ADMIN — CHLORHEXIDINE GLUCONATE 1 APPLICATION(S): 213 SOLUTION TOPICAL at 17:34

## 2022-06-14 RX ADMIN — Medication 75 MILLIGRAM(S): at 17:33

## 2022-06-14 RX ADMIN — PANTOPRAZOLE SODIUM 40 MILLIGRAM(S): 20 TABLET, DELAYED RELEASE ORAL at 05:30

## 2022-06-14 RX ADMIN — OXYCODONE HYDROCHLORIDE 10 MILLIGRAM(S): 5 TABLET ORAL at 20:33

## 2022-06-14 RX ADMIN — OXYCODONE HYDROCHLORIDE 10 MILLIGRAM(S): 5 TABLET ORAL at 04:42

## 2022-06-14 RX ADMIN — LOSARTAN POTASSIUM 25 MILLIGRAM(S): 100 TABLET, FILM COATED ORAL at 05:30

## 2022-06-14 RX ADMIN — OXYCODONE HYDROCHLORIDE 10 MILLIGRAM(S): 5 TABLET ORAL at 20:06

## 2022-06-14 RX ADMIN — OXYCODONE HYDROCHLORIDE 5 MILLIGRAM(S): 5 TABLET ORAL at 15:33

## 2022-06-14 RX ADMIN — OXYCODONE HYDROCHLORIDE 5 MILLIGRAM(S): 5 TABLET ORAL at 16:01

## 2022-06-14 RX ADMIN — OXYCODONE HYDROCHLORIDE 5 MILLIGRAM(S): 5 TABLET ORAL at 10:34

## 2022-06-14 NOTE — PROCEDURE NOTE - ADDITIONAL PROCEDURE DETAILS
Digby PowerPICC 4fr. catheter 41cm inserted 40cm into R Basilic vein. sherlock guide. Good flash, +ve Blood return, easily flushes, sharps disposed of. Tourniquet removed. 27 CM Cir.     LOT# [id number]

## 2022-06-14 NOTE — PROGRESS NOTE ADULT - SUBJECTIVE AND OBJECTIVE BOX
[facility]                                                INFECTIOUS DISEASES  =======================================================                               Chance Boggs MD#  Luis Manuel Anand MD*                                     Brielle De La Rosa MD*    Dania Villarreal MD*            Diplomates American Board of Internal Medicine & Infectious Diseases                  # [place] Office - Appt - Tel  [phone] Fax [phone]                * [place] Office - Appt - Tel [phone] Fax [phone]                                  Hospital Consult line:  [phone]  =======================================================    [id number]  DIANA JACKSONNTI  follow up:  MSSA infection, epidural abscess    repeat blood cultures from 6/9/22 1 of 2 sets positive. culture from 6/10/22 x 1 set is negative.   culture from 6/13/22 x 2 sets prelim negative.       I have personally reviewed the labs and data; pertinent labs and data are listed in this note; please see below.   =======================================================  Past Medical & Surgical Hx:  =====================  PAST MEDICAL & SURGICAL HISTORY:    Problem List:  ==========  HEALTH ISSUES - PROBLEM Dx:  Preop cardiovascular exam  History of atrial fibrillation  HTN (hypertension)      Social Hx:  =======  no toxic habits currently    FAMILY HISTORY:  no significant family history of immunosuppressive disorders in mother or father   =======================================================    REVIEW OF SYSTEMS:  CONSTITUTIONAL:  No Fever or chills  HEENT:  No diplopia or blurred vision.  No earache, sore throat or runny nose.  CARDIOVASCULAR:  No pressure, squeezing, strangling, tightness, heaviness or aching about the chest, neck, axilla or epigastrium.  RESPIRATORY:  No cough, shortness of breath  GASTROINTESTINAL:  No nausea, vomiting or diarrhea.  GENITOURINARY:  No dysuria, frequency or urgency. No Blood in urine  MUSCULOSKELETAL:  BACK PAIN  SKIN:  No change in skin, hair or nails.  NEUROLOGIC:  No Headaches, seizures or weakness.  PSYCHIATRIC:  No disorder of thought or mood.  ENDOCRINE:  No heat or cold intolerance  HEMATOLOGICAL:  No easy bruising or bleeding.    =======================================================  Allergies  No Known Allergies     ======================================================  Physical Exam:  ============     General:  No acute distress.  Eye: Pupils are equal, round and reactive to light, Extraocular movements are intact, Normal conjunctiva.  HENT: Normocephalic, Oral mucosa is moist, No pharyngeal erythema, No sinus tenderness.  Neck: Supple, No lymphadenopathy.  Respiratory: Lungs are clear to auscultation, Respirations are non-labored.  Cardiovascular: Normal rate, Regular rhythm,   Gastrointestinal: Soft, Non-tender, Non-distended, Normal bowel sounds.  Genitourinary: No costovertebral angle tenderness.  Lymphatics: No lymphadenopathy neck,   Musculoskeletal:  mid thoracic spine incision with staples in place. clean.    Integumentary: No rash.  Neurologic: Alert, Oriented, No focal deficits, Cranial Nerves II-XII are grossly intact.  Psychiatric: Appropriate mood & affect.    =======================================================    Vitals:  ============  T(F): 98 (14 Jun 2022 09:41), Max: 98.7 (13 Jun 2022 21:11)  HR: 86 (14 Jun 2022 09:41)  BP: 111/76 (14 Jun 2022 09:41)  RR: 18 (14 Jun 2022 09:41)  SpO2: 98% (14 Jun 2022 09:41) (93% - 98%)  temp max in last 48H T(F): , Max: 98.7 (06-13-22 @ 21:11)    =======================================================  Current Antibiotics:  ceFAZolin   IVPB 2000 milliGRAM(s) IV Intermittent every 8 hours  rifAMPin 300 milliGRAM(s) Oral two times a day    Other medications:  atorvastatin 10 milliGRAM(s) Oral at bedtime  chlorhexidine 2% Cloths 1 Application(s) Topical <User Schedule>  diltiazem    milliGRAM(s) Oral daily  losartan 25 milliGRAM(s) Oral daily  metoprolol tartrate 75 milliGRAM(s) Oral two times a day  pantoprazole    Tablet 40 milliGRAM(s) Oral before breakfast  polyethylene glycol 3350 17 Gram(s) Oral daily  senna 2 Tablet(s) Oral at bedtime      =======================================================  Labs:                        11.4   6.59  )-----------( 411      ( 14 Jun 2022 07:01 )             35.5     06-14    137  |  97<L>  |  9.9  ----------------------------<  132<H>  3.9   |  28.0  |  1.04    Ca    9.4      14 Jun 2022 07:01        Culture - Blood (collected 06-13-22 @ 02:00)  Source: .Blood Blood-Peripheral    Culture - Blood (collected 06-13-22 @ 02:00)  Source: .Blood Blood-Peripheral    Culture - Blood (collected 06-10-22 @ 11:20)  Source: .Blood Blood-Venous    Culture - Blood (collected 06-09-22 @ 15:08)  Source: .Blood Blood-Peripheral  Gram Stain (06-12-22 @ 04:52):    Growth in aerobic bottle: Gram Positive Cocci in Clusters  Final Report (06-14-22 @ 08:07):    Growth in aerobic bottle: Staphylococcus aureus  Organism: Blood Culture PCR  Staphylococcus aureus (06-14-22 @ 08:07)  Organism: Staphylococcus aureus (06-14-22 @ 08:07)    Sensitivities:      -  Ampicillin/Sulbactam: S <=8/4      -  Cefazolin: S <=4      -  Clindamycin: R <=0.25 This isolate is presumed to be clindamycin resistant based on detection of inducible resistance. Clindamycin may still be effective in some patients.      -  Erythromycin: R >4      -  Gentamicin: S <=1 Should not be used as monotherapy      -  Oxacillin: S 0.5 Oxacillin predicts susceptibility for dicloxacillin, methicillin, and nafcillin      -  Penicillin: R >8      -  Rifampin: S <=1 Should not be used as monotherapy      -  Tetra/Doxy: S <=1      -  Trimethoprim/Sulfamethoxazole: S <=0.5/9.5      -  Vancomycin: S 1      Method Type: JOVANI  Organism: Blood Culture PCR (06-14-22 @ 08:07)    Sensitivities:      -  Methicillin SENSITIVE Staphylococcus aureus (MSSA): Detec Any isolate of Staphylococcus aureus from a blood culture is NOT considered a contaminant.      Method Type: PCR    Culture - Blood (collected 06-09-22 @ 15:08)  Source: .Blood Blood-Peripheral    Culture - Blood (collected 06-07-22 @ 16:58)  Source: .Blood Blood-Peripheral  Final Report (06-12-22 @ 17:00):    No Growth Final    Culture - Blood (collected 06-07-22 @ 16:58)  Source: .Blood Blood-Peripheral  Gram Stain (06-08-22 @ 21:50):    Growth in aerobic bottle: Gram Positive Cocci in Clusters  Final Report (06-09-22 @ 16:51):    Growth in aerobic bottle: Staphylococcus aureus    See previous culture [id number]    Culture - Acid Fast - Tissue w/Smear (collected 06-07-22 @ 03:39)  Source: .Tissue epidural phlegmon    Culture - Tissue with Gram Stain (collected 06-07-22 @ 02:26)  Source: .Tissue epidural phlegmon  Gram Stain (06-07-22 @ 03:24):    No polymorphonuclear leukocytes seen per low power field    No organisms seen per oil power field  Final Report (06-11-22 @ 15:02):    Rare Staphylococcus aureus  Organism: Staphylococcus aureus (06-11-22 @ 15:02)  Organism: Staphylococcus aureus (06-11-22 @ 15:02)    Sensitivities:      -  Ampicillin/Sulbactam: S <=8/4      -  Cefazolin: S <=4      -  Clindamycin: R 0.5 This isolate is presumed to be clindamycin resistant based on detection of inducible resistance. Clindamycin may still be effective in some patients.      -  Erythromycin: R >4      -  Gentamicin: S <=1 Should not be used as monotherapy      -  Oxacillin: S 1 Oxacillin predicts susceptibility for dicloxacillin, methicillin, and nafcillin      -  Penicillin: R >8      -  Rifampin: S <=1 Should not be used as monotherapy      -  Tetra/Doxy: S <=1      -  Trimethoprim/Sulfamethoxazole: S <=0.5/9.5      -  Vancomycin: S 2      Method Type: JOVANI    Culture - Surgical Swab (collected 06-07-22 @ 02:15)  Source: .Surgical Swab thoracic epidural (swab)  Final Report (06-11-22 @ 15:38):    Rare Staphylococcus aureus  Organism: Staphylococcus aureus (06-11-22 @ 15:38)  Organism: Staphylococcus aureus (06-11-22 @ 15:38)    Sensitivities:      -  Ampicillin/Sulbactam: S <=8/4      -  Cefazolin: S <=4      -  Clindamycin: R <=0.25 This isolate is presumed to be clindamycin resistant based on detection of inducible resistance. Clindamycin may still be effective in some patients.      -  Erythromycin: R >4      -  Gentamicin: S <=1 Should not be used as monotherapy      -  Oxacillin: S 0.5 Oxacillin predicts susceptibility for dicloxacillin, methicillin, and nafcillin      -  Penicillin: R >8      -  Rifampin: S <=1 Should not be used as monotherapy      -  Tetra/Doxy: S <=1      -  Trimethoprim/Sulfamethoxazole: S <=0.5/9.5      -  Vancomycin: S 2      Method Type: JOVANI    Culture - Blood (collected 06-05-22 @ 02:06)  Source: .Blood Blood-Peripheral  Gram Stain (06-07-22 @ 09:08):    Growth in anaerobic bottle: Gram Positive Cocci in Clusters    ***Blood Panel PCR results on this specimen are available    approximately 3 hours after the Gram stain result.***    Gram stain, PCR, and/or culture results may not always    correspond due todifference in methodologies.    ************************************************************    This PCR assay was performed using [employer] microcaps.    The following targets are tested for: Enterococcus,    vancomycin resistant enterococci, Listeria monocytogenes,    coagulase negative staphylococci, S. aureus,    methicillin resistant S. aureus, Streptococcus agalactiae    (Group B), S. pneumoniae, S. pyogenes (Group A),    Acinetobacter baumannii, Enterobacter cloacae, E. coli,    Klebsiella oxytoca, K. pneumoniae, Proteus sp.,    Serratia marcescens, Haemophilus influenzae,    Neisseria meningitidis, Pseudomonas aeruginosa, Candida    albicans, C. glabrata, C krusei, C parapsilosis,    C. tropicalis and the KPC resistance gene.    Gram Stain and BCID performed by:    [facility]    [address] [place] [address]    .    TYPE: (C=Critical, N=Notification, A=Abnormal) C    TESTS:  _ GS    DATE/TIME CALLED: _ 06/07/2022 09:07:29    CALLED TO: Johnie Up RN    READ BACK (2 Patient Identifiers)(Y/N): _ Y    READ BACK VALUES (Y/N): _ Y    CALLED BY: Johnie Gardner  Final Report (06-09-22 @ 08:52):    Growth in anaerobic bottle: Staphylococcus aureus  Organism: Blood Culture PCR  Staphylococcus aureus (06-09-22 @ 16:50)  Organism: Staphylococcus aureus (06-09-22 @ 16:50)    Sensitivities:      -  Ampicillin/Sulbactam: S <=8/4      -  Cefazolin: S <=4      -  Clindamycin: R <=0.25 This isolate is presumed to be clindamycin resistant based on detection of inducible resistance. Clindamycin may still be effective in some patients.      -  Erythromycin: R >4      -  Gentamicin: S <=1 Should not be used as monotherapy      -  Oxacillin: S 0.5 Oxacillin predicts susceptibility for dicloxacillin, methicillin, and nafcillin      -  Penicillin: R >8      -  Rifampin: S <=1 Should not be used as monotherapy      -  Tetra/Doxy: S <=1      -  Trimethoprim/Sulfamethoxazole: S <=0.5/9.5      -  Vancomycin: S 1      Method Type: JOVANI  Organism: Blood Culture PCR (06-09-22 @ 08:52)    Sensitivities:      -  Methicillin SENSITIVE Staphylococcus aureus (MSSA): Detec Any isolate of Staphylococcus aureus from a blood culture is NOT considered a contaminant.      Method Type: PCR    Culture - Blood (collected 06-05-22 @ 02:05)  Source: .Blood Blood-Peripheral  Final Report (06-10-22 @ 03:00):    No growth at 5 days.        C-Reactive Protein, Serum: 23 mg/L (06-05-22 @ 02:05)    Sedimentation Rate, Erythrocyte: 24 mm/hr (06-05-22 @ 02:05)    Procalcitonin, Serum: 0.12 ng/mL (06-05-22 @ 02:05)    COVID-19 PCR: NotDetec (06-12-22 @ 16:44)  COVID-19 PCR: NotDetec (06-05-22 @ 17:07)      =======================================================

## 2022-06-14 NOTE — PROGRESS NOTE ADULT - SUBJECTIVE AND OBJECTIVE BOX
Chief Complaint:  epidural abscess    SUBJECTIVE / OVERNIGHT EVENTS: No acute events reported overnight.  Patient denies chest pain, SOB, abd pain, N/V, fever, chills, dysuria or any other complaints. All remainder ROS negative.       I&O's Summary    10 Gabriel 2022 07:01  -  11 Jun 2022 07:00  --------------------------------------------------------  IN: 0 mL / OUT: 600 mL / NET: -600 mL    11 Jun 2022 07:01  -  11 Jun 2022 12:47  --------------------------------------------------------  IN: 0 mL / OUT: 700 mL / NET: -700 mL          PHYSICAL EXAM:  Vital Signs Last 24 Hrs  T(C): 36.7 (14 Jun 2022 09:41), Max: 37.1 (13 Jun 2022 21:11)  T(F): 98 (14 Jun 2022 09:41), Max: 98.7 (13 Jun 2022 21:11)  HR: 86 (14 Jun 2022 09:41) (74 - 88)  BP: 111/76 (14 Jun 2022 09:41) (105/72 - 133/83)  BP(mean): --  RR: 18 (14 Jun 2022 09:41) (18 - 18)  SpO2: 98% (14 Jun 2022 09:41) (93% - 98%)      GENERAL: pt examined bedside, laying comfortably in bed in NAD  HEENT: NC/AT, moist oral mucosa, clear conjunctiva, sclera nonicteric  RESPIRATORY: Normal respiratory effort; CTA b/l, no wheezing, rhonchi, rales  CARDIOVASCULAR: RRR, normal S1 and S2, no murmur/rub/gallop  ABDOMEN: soft, NT/ND, normoactive bowel sounds, no rebound/guarding  MUSCLOSKELETAL:  spinal dressing CDI   EXTREMITIES: No cynaosis, no clubbing, no lower extremity edema; Peripheral pulses are 2+ bilaterally  PSYCH: affect appropriate and cooperative  NEUROLOGY: A+O to person, place, and time, decreased plantar sensation (chronic), no focal neurologic deficits appreciated   SKIN: No rashes or no palpable lesions        LABS:                                            11.4   6.59  )-----------( 411      ( 14 Jun 2022 07:01 )             35.5     06-14    137  |  97<L>  |  9.9  ----------------------------<  132<H>  3.9   |  28.0  |  1.04    Ca    9.4      14 Jun 2022 07:01          Culture - Blood (collected 10 Gabriel 2022 11:20)  Source: .Blood Blood-Venous  Preliminary Report (11 Jun 2022 12:01):    No growth to date.    Culture - Blood (collected 09 Jun 2022 15:08)  Source: .Blood Blood-Peripheral  Preliminary Report (10 Gabriel 2022 16:01):    No growth to date.      Culture - Blood (06.09.22 @ 15:08)    Gram Stain:   Growth in aerobic bottle: Gram Positive Cocci in Clusters    Specimen Source: .Blood Blood-Peripheral    Culture Results:   Growth in aerobic bottle: Gram Positive Cocci in Clusters        CAPILLARY BLOOD GLUCOSE            RADIOLOGY & ADDITIONAL TESTS:    < from: MR Lumbar Spine No Cont (06.06.22 @ 10:45) >  FINDINGS:    Redemonstrated is abnormal signal at T7/T8 compatible to patient's known   discitis osteomyelitis. Left arm midline ventral flattening at the T7/T8   level causing mild rightward cord compression on image 24 of series 9 is   redemonstrated and measures 8.4 x 7.7 x 30 mm. Posterior epidural   collection at the T7, T8, D8hcsuth best seen on image 8 of series 8   measuring 8.7 cm in its greatest CC dimension.    Abnormal signal changes at the L1/L2 level also concerning for   discitis/osteomyelitis.    Stable left para-aortic cyst measuring 3.8 x 3.2 x 5.5 cm. Bilateral   renal cyst.    Diffuse heterogeneous T1 marrow signal.    IMPRESSION: Grossly stable discitis/osteomyelitis at the T7/T8 level with   ventral and dorsal epidural collections, as above. Stable   discitis/osteomyelitis at L1/L2.    < end of copied text >        < from: CT Thoracic Spine No Cont (06.05.22 @ 10:45) >  IMPRESSION: Endplate erosion T7-8, T8-9 and L1-2 consistent with   osteomyelitis and discitis as on the MRI of the thoracic and lumbar spine   of 6/4/2022. Mild anterior and paraspinal soft tissue prominence at these   levels. The ventral epidural extension and the circumferential epidural   collection seen on the MRI is not appreciated.    < end of copied text >        MEDICATIONS  (STANDING):  atorvastatin 10 milliGRAM(s) Oral at bedtime  ceFAZolin   IVPB 2000 milliGRAM(s) IV Intermittent every 8 hours  diltiazem    milliGRAM(s) Oral daily  losartan 25 milliGRAM(s) Oral daily  metoprolol tartrate 75 milliGRAM(s) Oral two times a day  pantoprazole    Tablet 40 milliGRAM(s) Oral before breakfast  polyethylene glycol 3350 17 Gram(s) Oral daily  rifAMPin 300 milliGRAM(s) Oral two times a day  senna 2 Tablet(s) Oral at bedtime    MEDICATIONS  (PRN):  acetaminophen     Tablet .. 650 milliGRAM(s) Oral every 6 hours PRN Temp greater or equal to 38C (100.4F), Mild Pain (1 - 3)  aluminum hydroxide/magnesium hydroxide/simethicone Suspension 30 milliLiter(s) Oral every 4 hours PRN Dyspepsia  melatonin 3 milliGRAM(s) Oral at bedtime PRN Insomnia  ondansetron Injectable 4 milliGRAM(s) IV Push every 8 hours PRN Nausea and/or Vomiting  oxyCODONE    IR 5 milliGRAM(s) Oral every 4 hours PRN Moderate Pain (4 - 6)  oxyCODONE    IR 10 milliGRAM(s) Oral every 4 hours PRN Severe Pain (7 - 10)

## 2022-06-14 NOTE — PROGRESS NOTE ADULT - ASSESSMENT
61 jy/o M w/ PMH of HTN, DVT/PE, Testicular CA s/p chemo/radiation/LN Dissection was admitted to  for Acute Hypoxic Respiratory Failure 2/2 COVID PNA w/ hospital course complicated by MSSA Bacteremia subsequently found to have Diskitis/Osteomyelitis and Epidural Abscess for which he was transferred to [facility] for IR and Neurosurgical evaluation/intervention.  Pt underwent laminectomy with abscess drainage and laminectomy on 06/06.       Diskitis/Osteomyelitis/Epidural Abscess r/o Cord Compression/Cauda Equina c/b MSSA bacteremia  - s/p Laminectomy POD6  - Bcx x2 from 06/09 had been NGTD however 06/12 one of the bottles growing G(+) cocci in clusters w/ MSSA on PCR, Bcx from 06/10 NGTD   - Repeat Bcx x2 from 06/13 NGTD, cleared for PICC line   - Leukocytosis resolved, pt remains afebrile and nontoxic appearing   - Maintain on IV antibiotics   - Neurosurgery and ID following and recs noted       Acute Hypoxic Resp Failure 2/2 COVID PNA  - Vaccinated x2  - CTA Chest negative for PE  - Completed Remdesivir at [facility]  - Currently on room air   - Isolation d/c'd per ID recs       HTN / HLD  - Cardizem 240mg PO q24  - Metoprolol Tartrate 75mg PO BID  - Losartan 25mg PO q24      Testicular CA s/p chemo/radiation/LN Dissection  - PETSCAN 6-7 mos ago negative for malignancy  - CT ABD findings likely known Lymphocele / postsurgical changes from prior LN dissection  - Outpatient F/u w/ [facility]       VTE ppx: SCDs for now given recent laminectomy     Dispo: PICC line ordered and will start d/c planning.

## 2022-06-14 NOTE — PROGRESS NOTE ADULT - ASSESSMENT
This 61M with HTN, DVT/PE, Testicular CA s/p chemo/radiation/LN Dissection c/o SOB/RICKETTS and cough admitted to [facility] for Acute Hypoxic Respiratory Failure 2/2 COVID PNA. Treated with steroids and remdesivir. Found to have MSSA bacteremia treated with Ancef. Subsequently c/o low back pain had MRI [facility] which noted diskitis/osteomyelitis and epidural abscess. Patient evaluated by Neurosurgery at [facility] who recommended IR intervention and transfer to [facility]. Neurosurgery consulted. Pt seen/examined. Denies current complaints. Doing well. ROS negative. Neuro exam intact. Discussed with RN.     PMHX: HTN, DVT/PE, Testicular CA s/p chemo/radiation/LN Dissection  PSHX: Radical Orchiectomy, Hip Replacement, Hernia Repair  FamHx: +Mother CA +Mother DM2, +Father CAD  Social Hx: Former Smoker quit 20 years ago  NKDA (05 Jun 2022 02:52)    patient has been having back pain; recalls episode of severe back pain in a [employer] movies experience of [place] in 10/2021. he went to see a spine Dr. In [place] Who dx him with arthritis of the back and treated with pain meds.   about 2 weeks ago, he developed a boil in his RIGHT groin. He denies squeezing at it, but stated that it went away as quick as it came.     Denies IVDU.   no other skin conditions to report.     MRI and CT scan from [facility] reviewed.   MSSA identified in blood cultures on 5/29/22 x 2 sets  but cleared on 5/31/22 x 2 sets, and 6/1/22 x 2 sets.       Impression:  WBC elevation  MSSA bacteremia  Spine Osteomyelitis  thoracic spine abscess  back pan      Plan:  - neurosurgical team  following  s/p T8-T9 laminectomy 6/6/22; 2 grams of vanco powder placed in the wound per note.   OR cultures in were positive        repeat blood cx from 6/13/22 remains negative so far  - PICC LINE ordered from 6/14/22    - continue Cefazolin 2 grams Q8H  anticipate at least 6 weeks of IV antibiotics.   thru 7/21/22    continue Rifampin 300mg PO BID, started on 6/9/22  - will continue this thru 7/7/22       control of back pain per primary team        consider discharge soon.

## 2022-06-15 ENCOUNTER — TRANSCRIPTION ENCOUNTER (OUTPATIENT)
Age: 62
End: 2022-06-15

## 2022-06-15 VITALS
HEART RATE: 88 BPM | DIASTOLIC BLOOD PRESSURE: 83 MMHG | RESPIRATION RATE: 19 BRPM | SYSTOLIC BLOOD PRESSURE: 119 MMHG | TEMPERATURE: 98 F | OXYGEN SATURATION: 98 %

## 2022-06-15 LAB
CULTURE RESULTS: SIGNIFICANT CHANGE UP
SPECIMEN SOURCE: SIGNIFICANT CHANGE UP

## 2022-06-15 PROCEDURE — C1889: CPT

## 2022-06-15 PROCEDURE — 84100 ASSAY OF PHOSPHORUS: CPT

## 2022-06-15 PROCEDURE — 76000 FLUOROSCOPY <1 HR PHYS/QHP: CPT

## 2022-06-15 PROCEDURE — 97163 PT EVAL HIGH COMPLEX 45 MIN: CPT

## 2022-06-15 PROCEDURE — 36415 COLL VENOUS BLD VENIPUNCTURE: CPT

## 2022-06-15 PROCEDURE — 87040 BLOOD CULTURE FOR BACTERIA: CPT

## 2022-06-15 PROCEDURE — 86850 RBC ANTIBODY SCREEN: CPT

## 2022-06-15 PROCEDURE — 71045 X-RAY EXAM CHEST 1 VIEW: CPT

## 2022-06-15 PROCEDURE — 84145 PROCALCITONIN (PCT): CPT

## 2022-06-15 PROCEDURE — 87116 MYCOBACTERIA CULTURE: CPT

## 2022-06-15 PROCEDURE — 99239 HOSP IP/OBS DSCHRG MGMT >30: CPT

## 2022-06-15 PROCEDURE — 87070 CULTURE OTHR SPECIMN AEROBIC: CPT

## 2022-06-15 PROCEDURE — 87015 SPECIMEN INFECT AGNT CONCNTJ: CPT

## 2022-06-15 PROCEDURE — 87186 SC STD MICRODIL/AGAR DIL: CPT

## 2022-06-15 PROCEDURE — 80053 COMPREHEN METABOLIC PANEL: CPT

## 2022-06-15 PROCEDURE — 72148 MRI LUMBAR SPINE W/O DYE: CPT

## 2022-06-15 PROCEDURE — 80048 BASIC METABOLIC PNL TOTAL CA: CPT

## 2022-06-15 PROCEDURE — 85027 COMPLETE CBC AUTOMATED: CPT

## 2022-06-15 PROCEDURE — 86900 BLOOD TYPING SEROLOGIC ABO: CPT

## 2022-06-15 PROCEDURE — 87641 MR-STAPH DNA AMP PROBE: CPT

## 2022-06-15 PROCEDURE — 87077 CULTURE AEROBIC IDENTIFY: CPT

## 2022-06-15 PROCEDURE — 72146 MRI CHEST SPINE W/O DYE: CPT

## 2022-06-15 PROCEDURE — U0005: CPT

## 2022-06-15 PROCEDURE — 85025 COMPLETE CBC W/AUTO DIFF WBC: CPT

## 2022-06-15 PROCEDURE — 87206 SMEAR FLUORESCENT/ACID STAI: CPT

## 2022-06-15 PROCEDURE — 83735 ASSAY OF MAGNESIUM: CPT

## 2022-06-15 PROCEDURE — 86140 C-REACTIVE PROTEIN: CPT

## 2022-06-15 PROCEDURE — 85730 THROMBOPLASTIN TIME PARTIAL: CPT

## 2022-06-15 PROCEDURE — U0003: CPT

## 2022-06-15 PROCEDURE — 99232 SBSQ HOSP IP/OBS MODERATE 35: CPT

## 2022-06-15 PROCEDURE — 86901 BLOOD TYPING SEROLOGIC RH(D): CPT

## 2022-06-15 PROCEDURE — 72128 CT CHEST SPINE W/O DYE: CPT

## 2022-06-15 PROCEDURE — 87075 CULTR BACTERIA EXCEPT BLOOD: CPT

## 2022-06-15 PROCEDURE — 87150 DNA/RNA AMPLIFIED PROBE: CPT

## 2022-06-15 PROCEDURE — 87640 STAPH A DNA AMP PROBE: CPT

## 2022-06-15 PROCEDURE — 82962 GLUCOSE BLOOD TEST: CPT

## 2022-06-15 PROCEDURE — 85652 RBC SED RATE AUTOMATED: CPT

## 2022-06-15 PROCEDURE — 86803 HEPATITIS C AB TEST: CPT

## 2022-06-15 PROCEDURE — 85610 PROTHROMBIN TIME: CPT

## 2022-06-15 RX ORDER — LOSARTAN POTASSIUM 100 MG/1
1 TABLET, FILM COATED ORAL
Qty: 0 | Refills: 0 | DISCHARGE
Start: 2022-06-15

## 2022-06-15 RX ADMIN — Medication 240 MILLIGRAM(S): at 05:11

## 2022-06-15 RX ADMIN — Medication 100 MILLIGRAM(S): at 05:13

## 2022-06-15 RX ADMIN — OXYCODONE HYDROCHLORIDE 10 MILLIGRAM(S): 5 TABLET ORAL at 10:09

## 2022-06-15 RX ADMIN — PANTOPRAZOLE SODIUM 40 MILLIGRAM(S): 20 TABLET, DELAYED RELEASE ORAL at 05:10

## 2022-06-15 RX ADMIN — CHLORHEXIDINE GLUCONATE 1 APPLICATION(S): 213 SOLUTION TOPICAL at 05:13

## 2022-06-15 RX ADMIN — LOSARTAN POTASSIUM 25 MILLIGRAM(S): 100 TABLET, FILM COATED ORAL at 05:11

## 2022-06-15 RX ADMIN — OXYCODONE HYDROCHLORIDE 5 MILLIGRAM(S): 5 TABLET ORAL at 05:12

## 2022-06-15 RX ADMIN — Medication 100 MILLIGRAM(S): at 12:31

## 2022-06-15 RX ADMIN — Medication 75 MILLIGRAM(S): at 12:31

## 2022-06-15 RX ADMIN — OXYCODONE HYDROCHLORIDE 5 MILLIGRAM(S): 5 TABLET ORAL at 05:11

## 2022-06-15 RX ADMIN — OXYCODONE HYDROCHLORIDE 5 MILLIGRAM(S): 5 TABLET ORAL at 00:32

## 2022-06-15 RX ADMIN — OXYCODONE HYDROCHLORIDE 5 MILLIGRAM(S): 5 TABLET ORAL at 01:11

## 2022-06-15 RX ADMIN — OXYCODONE HYDROCHLORIDE 5 MILLIGRAM(S): 5 TABLET ORAL at 14:35

## 2022-06-15 RX ADMIN — Medication 75 MILLIGRAM(S): at 05:11

## 2022-06-15 NOTE — PROGRESS NOTE ADULT - SUBJECTIVE AND OBJECTIVE BOX
Chief Complaint:  epidural abscess    SUBJECTIVE / OVERNIGHT EVENTS: No acute events reported overnight.  Patient denies chest pain, SOB, abd pain, N/V, fever, chills, dysuria or any other complaints. All remainder ROS negative.       PHYSICAL EXAM:  Vital Signs Last 24 Hrs  T(C): 36.7 (15 Gabriel 2022 05:02), Max: 36.9 (14 Jun 2022 20:57)  T(F): 98.1 (15 Gabriel 2022 05:02), Max: 98.5 (14 Jun 2022 20:57)  HR: 89 (15 Gabriel 2022 05:02) (83 - 89)  BP: 113/82 (15 Gabriel 2022 05:02) (111/76 - 125/86)  BP(mean): --  RR: 18 (15 Gabriel 2022 05:02) (18 - 18)  SpO2: 96% (15 Gabriel 2022 05:02) (81% - 98%)      GENERAL: pt examined bedside, laying comfortably in bed in NAD  HEENT: NC/AT, moist oral mucosa, clear conjunctiva, sclera nonicteric  RESPIRATORY: Normal respiratory effort; CTA b/l, no wheezing, rhonchi, rales  CARDIOVASCULAR: RRR, normal S1 and S2, no murmur/rub/gallop  ABDOMEN: soft, NT/ND, normoactive bowel sounds, no rebound/guarding  MUSCLOSKELETAL:  spinal dressing CDI   EXTREMITIES: No cynaosis, no clubbing, no lower extremity edema; Peripheral pulses are 2+ bilaterally  PSYCH: affect appropriate and cooperative  NEUROLOGY: A+O to person, place, and time, decreased plantar sensation (chronic), no focal neurologic deficits appreciated   SKIN: No rashes or no palpable lesions        LABS:                                         11.4   6.59  )-----------( 411      ( 14 Jun 2022 07:01 )             35.5       06-14    137  |  97<L>  |  9.9  ----------------------------<  132<H>  3.9   |  28.0  |  1.04    Ca    9.4      14 Jun 2022 07:01        Culture - Blood (collected 10 Gabriel 2022 11:20)  Source: .Blood Blood-Venous  Preliminary Report (11 Jun 2022 12:01):    No growth to date.    Culture - Blood (collected 09 Jun 2022 15:08)  Source: .Blood Blood-Peripheral  Preliminary Report (10 Gabriel 2022 16:01):    No growth to date.      Culture - Blood (06.09.22 @ 15:08)    Gram Stain:   Growth in aerobic bottle: Gram Positive Cocci in Clusters    Specimen Source: .Blood Blood-Peripheral    Culture Results:   Growth in aerobic bottle: Gram Positive Cocci in Clusters        CAPILLARY BLOOD GLUCOSE            RADIOLOGY & ADDITIONAL TESTS:    < from: MR Lumbar Spine No Cont (06.06.22 @ 10:45) >  FINDINGS:    Redemonstrated is abnormal signal at T7/T8 compatible to patient's known   discitis osteomyelitis. Left arm midline ventral flattening at the T7/T8   level causing mild rightward cord compression on image 24 of series 9 is   redemonstrated and measures 8.4 x 7.7 x 30 mm. Posterior epidural   collection at the T7, T8, [id number] best seen on image 8 of series 8   measuring 8.7 cm in its greatest CC dimension.    Abnormal signal changes at the L1/L2 level also concerning for   discitis/osteomyelitis.    Stable left para-aortic cyst measuring 3.8 x 3.2 x 5.5 cm. Bilateral   renal cyst.    Diffuse heterogeneous T1 marrow signal.    IMPRESSION: Grossly stable discitis/osteomyelitis at the T7/T8 level with   ventral and dorsal epidural collections, as above. Stable   discitis/osteomyelitis at L1/L2.    < end of copied text >        < from: CT Thoracic Spine No Cont (06.05.22 @ 10:45) >  IMPRESSION: Endplate erosion T7-8, T8-9 and L1-2 consistent with   osteomyelitis and discitis as on the MRI of the thoracic and lumbar spine   of 6/4/2022. Mild anterior and paraspinal soft tissue prominence at these   levels. The ventral epidural extension and the circumferential epidural   collection seen on the MRI is not appreciated.    < end of copied text >        MEDICATIONS  (STANDING):  atorvastatin 10 milliGRAM(s) Oral at bedtime  ceFAZolin   IVPB 2000 milliGRAM(s) IV Intermittent every 8 hours  diltiazem    milliGRAM(s) Oral daily  losartan 25 milliGRAM(s) Oral daily  metoprolol tartrate 75 milliGRAM(s) Oral two times a day  pantoprazole    Tablet 40 milliGRAM(s) Oral before breakfast  polyethylene glycol 3350 17 Gram(s) Oral daily  rifAMPin 300 milliGRAM(s) Oral two times a day  senna 2 Tablet(s) Oral at bedtime    MEDICATIONS  (PRN):  acetaminophen     Tablet .. 650 milliGRAM(s) Oral every 6 hours PRN Temp greater or equal to 38C (100.4F), Mild Pain (1 - 3)  aluminum hydroxide/magnesium hydroxide/simethicone Suspension 30 milliLiter(s) Oral every 4 hours PRN Dyspepsia  melatonin 3 milliGRAM(s) Oral at bedtime PRN Insomnia  ondansetron Injectable 4 milliGRAM(s) IV Push every 8 hours PRN Nausea and/or Vomiting  oxyCODONE    IR 5 milliGRAM(s) Oral every 4 hours PRN Moderate Pain (4 - 6)  oxyCODONE    IR 10 milliGRAM(s) Oral every 4 hours PRN Severe Pain (7 - 10)

## 2022-06-15 NOTE — PROGRESS NOTE ADULT - PROVIDER SPECIALTY LIST ADULT
Hospitalist
Infectious Disease
Neurosurgery
Neurosurgery
Orthopedics
Infectious Disease
Internal Medicine
Neurosurgery
Hospitalist
Infectious Disease
Neurosurgery
Hospitalist
Neurosurgery
Cardiology

## 2022-06-15 NOTE — PROGRESS NOTE ADULT - ASSESSMENT
This 61M with HTN, DVT/PE, Testicular CA s/p chemo/radiation/LN Dissection c/o SOB/RICKETTS and cough admitted to [facility] for Acute Hypoxic Respiratory Failure 2/2 COVID PNA. Treated with steroids and remdesivir. Found to have MSSA bacteremia treated with Ancef. Subsequently c/o low back pain had MRI WO which noted diskitis/osteomyelitis and epidural abscess. Patient evaluated by Neurosurgery at [place] who recommended IR intervention and transfer to [facility]. Neurosurgery consulted. Pt seen/examined. Denies current complaints. Doing well. ROS negative. Neuro exam intact. Discussed with RN.     PMHX: HTN, DVT/PE, Testicular CA s/p chemo/radiation/LN Dissection  PSHX: Radical Orchiectomy, Hip Replacement, Hernia Repair  FamHx: +Mother CA +Mother DM2, +Father CAD  Social Hx: Former Smoker quit 20 years ago  NKDA (05 Jun 2022 02:52)    patient has been having back pain; recalls episode of severe back pain in a 4D movies experience of [place] in 10/2021. he went to see a spine Dr. In [place] Who dx him with arthritis of the back and treated with pain meds.   about 2 weeks ago, he developed a boil in his RIGHT groin. He denies squeezing at it, but stated that it went away as quick as it came.     Denies IVDU.   no other skin conditions to report.     MRI and CT scan from [facility] reviewed.   MSSA identified in blood cultures on 5/29/22 x 2 sets  but cleared on 5/31/22 x 2 sets, and 6/1/22 x 2 sets.       Impression:  WBC elevation  MSSA bacteremia  Spine Osteomyelitis  thoracic spine abscess  back pan      Plan:  - neurosurgical team  following  s/p T8-T9 laminectomy 6/6/22; 2 grams of vanco powder placed in the wound per note.   OR cultures in were positive        repeat blood cx from 6/13/22 remains negative so far  - PICC LINE ordered from 6/14/22    - continue Cefazolin 2 grams Q8H  anticipate at least 6 weeks of IV antibiotics.   thru 7/21/22  - check weekly Labs - CBC CMP ESR CRP  to be reviewed by medical officer at facility.       continue Rifampin 300mg PO BID, started on 6/9/22  - will continue this thru 7/7/22       control of back pain per primary team      Consider discharge to community from ID point of view once antibiotics have been arranged.    patient can follow with me in 2 weeks:  Office Contact Information:    =======================================================                               Chance Boggs MD#  Luis Manuel Anand MD*                                     Brielle De La Rosa MD*    Dania Villarreal MD*            Diplomates American Board of Internal Medicine & Infectious Diseases                  # [place] Office - Appt - Tel  [phone] Fax [phone]                * [place] Office - Appt - Tel [phone] Fax [phone]                                  Hospital Consult line:  [phone]  =======================================================

## 2022-06-15 NOTE — DISCHARGE NOTE NURSING/CASE MANAGEMENT/SOCIAL WORK - NSDCPEFALRISK_GEN_ALL_CORE
For information on Fall & Injury Prevention, visit: [email or web address] OR  [email or web address] OR  https://www.cdc.gov/steadi/patient.html

## 2022-06-15 NOTE — DISCHARGE NOTE NURSING/CASE MANAGEMENT/SOCIAL WORK - PATIENT PORTAL LINK FT
You can access the FollowMyHealth Patient Portal offered by [facility] by registering at the following website: [email or web address]. By joining [employer]’s FollowMyHealth portal, you will also be able to view your health information using other applications (apps) compatible with our system.

## 2022-06-15 NOTE — DISCHARGE NOTE PROVIDER - PROVIDER TOKENS
PROVIDER:[TOKEN:[[id number]],FOLLOWUP:[1 week]],FREE:[LAST:[PCP],PHONE:[(   )    -],FAX:[(   )    -],FOLLOWUP:[1 week]],FREE:[LAST:[[facility]],PHONE:[(   )    -],FAX:[(   )    -],FOLLOWUP:[1 week]],PROVIDER:[TOKEN:[[id number]],FOLLOWUP:[1 week]] PROVIDER:[TOKEN:[[id number]],FOLLOWUP:[1 week]],PROVIDER:[TOKEN:[[id number]],FOLLOWUP:[1 week]],FREE:[LAST:[PCP],PHONE:[(   )    -],FAX:[(   )    -],FOLLOWUP:[1 week]],FREE:[LAST:[[facility]],PHONE:[(   )    -],FAX:[(   )    -],FOLLOWUP:[Routine]]

## 2022-06-15 NOTE — DISCHARGE NOTE PROVIDER - HOSPITAL COURSE
62 y/o M w/ PMH of HTN, DVT/ PE, Testicular CA s/p chemo/radiation/LN Dissection was admitted to  for Acute Hypoxic Respiratory Failure 2/2 COVID PNA w/ hospital course complicated by MSSA Bacteremia subsequently found to have Diskitis/Osteomyelitis and Epidural Abscess for which he was transferred to [facility] for IR and Neurosurgical evaluation/intervention.  Pt underwent laminectomy with abscess drainage and laminectomy on 06/06. Bcx x2 from 06/09 had been NGTD however 06/12 one of the bottles growing gram (+) cocci in clusters w/ MSSA on PCR, Bcx from 06/10 NGTD. Repeat Bcx 6/13/22 NGTD. PICC line inserted 6/14 and pt to continue abx therapy with cefazolin through 7/21/22 and rifampin PO through 7/7/22. Pt is now medically stable for discharge to [facility] with appropriate outpatient follow up.     All electrolyte abnormalities were monitored carefully and repleted as necessary during this hospitalization. At the time of discharge patient was hemodynamically stable and amenable to all terms of discharge.

## 2022-06-15 NOTE — PROGRESS NOTE ADULT - SUBJECTIVE AND OBJECTIVE BOX
[facility]                                                INFECTIOUS DISEASES  =======================================================                               Chance Boggs MD#  Luis Manuel Anand MD*                                     Brielle De La Rosa MD*    Dania Villarreal MD*            Diplomates American Board of Internal Medicine & Infectious Diseases                  # [place] Office - Appt - Tel  [phone] Fax [phone]                * [place] Office - Appt - Tel [phone] Fax [phone]                                  Hospital Consult line:  [phone]  =======================================================    [id number]  DIANA JACKSONNTI  follow up:  MSSA infection, epidural abscess    repeat blood cultures from 6/9/22 1 of 2 sets positive. culture from 6/10/22 x 1 set is negative.   culture from 6/13/22 x 2 sets remain negative.           I have personally reviewed the labs and data; pertinent labs and data are listed in this note; please see below.   =======================================================  Past Medical & Surgical Hx:  =====================  PAST MEDICAL & SURGICAL HISTORY:    Problem List:  ==========  HEALTH ISSUES - PROBLEM Dx:  Preop cardiovascular exam  History of atrial fibrillation  HTN (hypertension)      Social Hx:  =======  no toxic habits currently    FAMILY HISTORY:  no significant family history of immunosuppressive disorders in mother or father   =======================================================    REVIEW OF SYSTEMS:  CONSTITUTIONAL:  No Fever or chills  HEENT:  No diplopia or blurred vision.  No earache, sore throat or runny nose.  CARDIOVASCULAR:  No pressure, squeezing, strangling, tightness, heaviness or aching about the chest, neck, axilla or epigastrium.  RESPIRATORY:  No cough, shortness of breath  GASTROINTESTINAL:  No nausea, vomiting or diarrhea.  GENITOURINARY:  No dysuria, frequency or urgency. No Blood in urine  MUSCULOSKELETAL:  minimal back pain  NEUROLOGIC:  No Headaches, seizures or weakness.  PSYCHIATRIC:  No disorder of thought or mood.  ENDOCRINE:  No heat or cold intolerance  HEMATOLOGICAL:  No easy bruising or bleeding.    =======================================================  Allergies  No Known Allergies     ======================================================  Physical Exam:  ============     General:  No acute distress.  Eye: Pupils are equal, round and reactive to light, Extraocular movements are intact, Normal conjunctiva.  HENT: Normocephalic, Oral mucosa is moist, No pharyngeal erythema, No sinus tenderness.  Neck: Supple, No lymphadenopathy.  Respiratory: Lungs are clear to auscultation, Respirations are non-labored.  Cardiovascular: Normal rate, Regular rhythm,   Gastrointestinal: Soft, Non-tender, Non-distended, Normal bowel sounds.  Genitourinary: No costovertebral angle tenderness.  Lymphatics: No lymphadenopathy neck,   Musculoskeletal:  mid thoracic spine incision with staples in place. clean/ dry/ intact    Integumentary: No rash.  Neurologic: Alert, Oriented, No focal deficits, Cranial Nerves II-XII are grossly intact.  Psychiatric: Appropriate mood & affect.    =======================================================     Vitals:  ============  T(F): 97.8 (15 Gabriel 2022 10:37), Max: 98.5 (14 Jun 2022 20:57)  HR: 88 (15 Gabriel 2022 10:37)  BP: 119/83 (15 Gabriel 2022 10:37)  RR: 19 (15 Gabriel 2022 10:37)  SpO2: 98% (15 Gabriel 2022 10:37) (81% - 98%)  temp max in last 48H T(F): , Max: 98.7 (06-13-22 @ 21:11)    =======================================================  Current Antibiotics:  ceFAZolin   IVPB 2000 milliGRAM(s) IV Intermittent every 8 hours  rifAMPin 300 milliGRAM(s) Oral two times a day    Other medications:  atorvastatin 10 milliGRAM(s) Oral at bedtime  chlorhexidine 2% Cloths 1 Application(s) Topical <User Schedule>  chlorhexidine 2% Cloths 1 Application(s) Topical <User Schedule>  diltiazem    milliGRAM(s) Oral daily  losartan 25 milliGRAM(s) Oral daily  metoprolol tartrate 75 milliGRAM(s) Oral two times a day  pantoprazole    Tablet 40 milliGRAM(s) Oral before breakfast  polyethylene glycol 3350 17 Gram(s) Oral daily  senna 2 Tablet(s) Oral at bedtime      =======================================================  Labs:                        11.4   6.59  )-----------( 411      ( 14 Jun 2022 07:01 )             35.5     06-14    137  |  97<L>  |  9.9  ----------------------------<  132<H>  3.9   |  28.0  |  1.04    Ca    9.4      14 Jun 2022 07:01        Culture - Blood (collected 06-13-22 @ 02:00)  Source: .Blood Blood-Peripheral    Culture - Blood (collected 06-13-22 @ 02:00)  Source: .Blood Blood-Peripheral    Culture - Blood (collected 06-10-22 @ 11:20)  Source: .Blood Blood-Venous    Culture - Blood (collected 06-09-22 @ 15:08)  Source: .Blood Blood-Peripheral  Gram Stain (06-12-22 @ 04:52):    Growth in aerobic bottle: Gram Positive Cocci in Clusters  Final Report (06-14-22 @ 08:07):    Growth in aerobic bottle: Staphylococcus aureus  Organism: Blood Culture PCR  Staphylococcus aureus (06-14-22 @ 08:07)  Organism: Staphylococcus aureus (06-14-22 @ 08:07)    Sensitivities:      -  Ampicillin/Sulbactam: S <=8/4      -  Cefazolin: S <=4      -  Clindamycin: R <=0.25 This isolate is presumed to be clindamycin resistant based on detection of inducible resistance. Clindamycin may still be effective in some patients.      -  Erythromycin: R >4      -  Gentamicin: S <=1 Should not be used as monotherapy      -  Oxacillin: S 0.5 Oxacillin predicts susceptibility for dicloxacillin, methicillin, and nafcillin      -  Penicillin: R >8      -  Rifampin: S <=1 Should not be used as monotherapy      -  Tetra/Doxy: S <=1      -  Trimethoprim/Sulfamethoxazole: S <=0.5/9.5      -  Vancomycin: S 1      Method Type: JOVANI  Organism: Blood Culture PCR (06-14-22 @ 08:07)    Sensitivities:      -  Methicillin SENSITIVE Staphylococcus aureus (MSSA): Detec Any isolate of Staphylococcus aureus from a blood culture is NOT considered a contaminant.      Method Type: PCR    Culture - Blood (collected 06-09-22 @ 15:08)  Source: .Blood Blood-Peripheral  Final Report (06-14-22 @ 16:00):    No Growth Final    Culture - Blood (collected 06-07-22 @ 16:58)  Source: .Blood Blood-Peripheral  Final Report (06-12-22 @ 17:00):    No Growth Final    Culture - Blood (collected 06-07-22 @ 16:58)  Source: .Blood Blood-Peripheral  Gram Stain (06-08-22 @ 21:50):    Growth in aerobic bottle: Gram Positive Cocci in Clusters  Final Report (06-09-22 @ 16:51):    Growth in aerobic bottle: Staphylococcus aureus    See previous culture [id number]    Culture - Acid Fast - Tissue w/Smear (collected 06-07-22 @ 03:39)  Source: .Tissue epidural phlegmon    Culture - Tissue with Gram Stain (collected 06-07-22 @ 02:26)  Source: .Tissue epidural phlegmon  Gram Stain (06-07-22 @ 03:24):    No polymorphonuclear leukocytes seen per low power field    No organisms seen per oil power field  Final Report (06-11-22 @ 15:02):    Rare Staphylococcus aureus  Organism: Staphylococcus aureus (06-11-22 @ 15:02)  Organism: Staphylococcus aureus (06-11-22 @ 15:02)    Sensitivities:      -  Ampicillin/Sulbactam: S <=8/4      -  Cefazolin: S <=4      -  Clindamycin: R 0.5 This isolate is presumed to be clindamycin resistant based on detection of inducible resistance. Clindamycin may still be effective in some patients.      -  Erythromycin: R >4      -  Gentamicin: S <=1 Should not be used as monotherapy      -  Oxacillin: S 1 Oxacillin predicts susceptibility for dicloxacillin, methicillin, and nafcillin      -  Penicillin: R >8      -  Rifampin: S <=1 Should not be used as monotherapy      -  Tetra/Doxy: S <=1      -  Trimethoprim/Sulfamethoxazole: S <=0.5/9.5      -  Vancomycin: S 2      Method Type: JOVANI    Culture - Surgical Swab (collected 06-07-22 @ 02:15)  Source: .Surgical Swab thoracic epidural (swab)  Final Report (06-11-22 @ 15:38):    Rare Staphylococcus aureus  Organism: Staphylococcus aureus (06-11-22 @ 15:38)  Organism: Staphylococcus aureus (06-11-22 @ 15:38)    Sensitivities:      -  Ampicillin/Sulbactam: S <=8/4      -  Cefazolin: S <=4      -  Clindamycin: R <=0.25 This isolate is presumed to be clindamycin resistant based on detection of inducible resistance. Clindamycin may still be effective in some patients.      -  Erythromycin: R >4      -  Gentamicin: S <=1 Should not be used as monotherapy      -  Oxacillin: S 0.5 Oxacillin predicts susceptibility for dicloxacillin, methicillin, and nafcillin      -  Penicillin: R >8      -  Rifampin: S <=1 Should not be used as monotherapy      -  Tetra/Doxy: S <=1      -  Trimethoprim/Sulfamethoxazole: S <=0.5/9.5      -  Vancomycin: S 2      Method Type: JOVANI    Culture - Blood (collected 06-05-22 @ 02:06)  Source: .Blood Blood-Peripheral  Gram Stain (06-07-22 @ 09:08):    Growth in anaerobic bottle: Gram Positive Cocci in Clusters    ***Blood Panel PCR results on this specimen are available    approximately 3 hours after the Gram stain result.***    Gram stain, PCR, and/or culture results may not always    correspond due todifference in methodologies.    ************************************************************    This PCR assay was performed using [employer].    The following targets are tested for: Enterococcus,    vancomycin resistant enterococci, Listeria monocytogenes,    coagulase negative staphylococci, S. aureus,    methicillin resistant S. aureus, Streptococcus agalactiae    (Group B), S. pneumoniae, S. pyogenes (Group A),    Acinetobacter baumannii, Enterobacter cloacae, E. coli,    Klebsiella oxytoca, K. pneumoniae, Proteus sp.,    Serratia marcescens, Haemophilus influenzae,    Neisseria meningitidis, Pseudomonas aeruginosa, Candida    albicans, C. glabrata, C krusei, C parapsilosis,    C. tropicalis and the KPC resistance gene.    Gram Stain and BCID performed by:    [facility]    [address] [place] [address]    .    TYPE: (C=Critical, N=Notification, A=Abnormal) C    TESTS:  _ GS    DATE/TIME CALLED: _ 06/07/2022 09:07:29    CALLED TO: Johnie Up RN    READ BACK (2 Patient Identifiers)(Y/N): _ Y    READ BACK VALUES (Y/N): _ Y    CALLED BY: Johnie Gardner  Final Report (06-09-22 @ 08:52):    Growth in anaerobic bottle: Staphylococcus aureus  Organism: Blood Culture PCR  Staphylococcus aureus (06-09-22 @ 16:50)  Organism: Staphylococcus aureus (06-09-22 @ 16:50)    Sensitivities:      -  Ampicillin/Sulbactam: S <=8/4      -  Cefazolin: S <=4      -  Clindamycin: R <=0.25 This isolate is presumed to be clindamycin resistant based on detection of inducible resistance. Clindamycin may still be effective in some patients.      -  Erythromycin: R >4      -  Gentamicin: S <=1 Should not be used as monotherapy      -  Oxacillin: S 0.5 Oxacillin predicts susceptibility for dicloxacillin, methicillin, and nafcillin      -  Penicillin: R >8      -  Rifampin: S <=1 Should not be used as monotherapy      -  Tetra/Doxy: S <=1      -  Trimethoprim/Sulfamethoxazole: S <=0.5/9.5      -  Vancomycin: S 1      Method Type: JOVANI  Organism: Blood Culture PCR (06-09-22 @ 08:52)    Sensitivities:      -  Methicillin SENSITIVE Staphylococcus aureus (MSSA): Detec Any isolate of Staphylococcus aureus from a blood culture is NOT considered a contaminant.      Method Type: PCR    Culture - Blood (collected 06-05-22 @ 02:05)  Source: .Blood Blood-Peripheral  Final Report (06-10-22 @ 03:00):    No growth at 5 days.        C-Reactive Protein, Serum: 23 mg/L (06-05-22 @ 02:05)    Sedimentation Rate, Erythrocyte: 24 mm/hr (06-05-22 @ 02:05)    Procalcitonin, Serum: 0.12 ng/mL (06-05-22 @ 02:05)    COVID-19 PCR: NotDetec (06-12-22 @ 16:44)  COVID-19 PCR: NotDetec (06-05-22 @ 17:07)

## 2022-06-15 NOTE — DISCHARGE NOTE PROVIDER - CARE PROVIDERS DIRECT ADDRESSES
[email or web address],DirectAddress_Unknown,DirectAddress_Unknown

## 2022-06-15 NOTE — PROGRESS NOTE ADULT - ASSESSMENT
60 y/o M s/p T6-9 multilevel thoracolumbar laminectomy for epidural abscess evacuation POD9.    Plan:  -Pain control as needed, avoid over sedation  -Maintain normotension, -150  -Continue patient activity OOB  -SCDs for DVT prophylaxis if patient is unable to ambulate  -Thoracic CRISTIAN drain D/C'd 6/10  -PICC placed 6/14 per ID  -Continue to follow

## 2022-06-15 NOTE — DISCHARGE NOTE PROVIDER - CARE PROVIDER_API CALL
Jorge Luis Vicente)  Neurosurgery  [address] [facility], [address]  [place] [address]  Phone: [phone]  Fax: [phone]  Follow Up Time: 1 week    PCP,   Phone: (   )    -  Fax: (   )    -  Follow Up Time: 1 week    [facility],   Phone: (   )    -  Fax: (   )    -  Follow Up Time: 1 week    Luis Manuel Anand)  Infectious Disease; Internal Medicine  [address] [place] [address]  Phone: [phone]  Fax: [phone]  Follow Up Time: 1 week   Jorge Luis Vicente)  Neurosurgery  [address] [facility], [address]  [place] [address]  Phone: [phone]  Fax: [phone]  Follow Up Time: 1 week    Luis Manuel Anand)  Infectious Disease; Internal Medicine  [address] [place] [address]  Phone: [phone]  Fax: [phone]  Follow Up Time: 1 week    PCP,   Phone: (   )    -  Fax: (   )    -  Follow Up Time: 1 week    [facility],   Phone: (   )    -  Fax: (   )    -  Follow Up Time: Routine

## 2022-06-15 NOTE — PROGRESS NOTE ADULT - ASSESSMENT
61 jy/o M w/ PMH of HTN, DVT/PE, Testicular CA s/p chemo/radiation/LN Dissection was admitted to  for Acute Hypoxic Respiratory Failure 2/2 COVID PNA w/ hospital course complicated by MSSA Bacteremia subsequently found to have Diskitis/Osteomyelitis and Epidural Abscess for which he was transferred to [facility] for IR and Neurosurgical evaluation/intervention.  Pt underwent laminectomy with abscess drainage and laminectomy on 06/06.       Diskitis/Osteomyelitis/Epidural Abscess r/o Cord Compression/Cauda Equina c/b MSSA bacteremia  - s/p Laminectomy POD7  - Bcx x2 from 06/09 had been NGTD however 06/12 one of the bottles growing G(+) cocci in clusters w/ MSSA on PCR, Bcx from 06/10 NGTD   - Repeat Bcx x2 from 06/13 NGTD, PICC line placed  - Leukocytosis resolved, pt remains afebrile and nontoxic appearing   - Maintain on IV antibiotics   - Neurosurgery and ID following and recs noted       Acute Hypoxic Resp Failure 2/2 COVID PNA  - Vaccinated x2  - CTA Chest negative for PE  - Completed Remdesivir at [place]  - Currently on room air   - Isolation d/c'd per ID recs       HTN / HLD  - Cardizem 240mg PO q24  - Metoprolol Tartrate 75mg PO BID  - Losartan 25mg PO q24      Testicular CA s/p chemo/radiation/LN Dissection  - PETSCAN 6-7 mos ago negative for malignancy  - CT ABD findings likely known Lymphocele / postsurgical changes from prior LN dissection  - Outpatient F/u w/ [facility]       VTE ppx: SCDs for now given recent laminectomy     Dispo: Pending DALY placement.

## 2022-06-15 NOTE — DISCHARGE NOTE PROVIDER - NSDCMRMEDTOKEN_GEN_ALL_CORE_FT
ALPRAZolam 0.25 mg oral tablet: 0.5 tab(s) orally once a day, As Needed  atorvastatin 10 mg oral tablet: 1 tab(s) orally once a day  Cartia  mg/24 hours oral capsule, extended release: 1 cap(s) orally once a day  ceFAZolin 1 g injection: 2 gram(s) intravenously every 8 hours MDD:thru 7/21/22; weekly CBC CMP ESR CRP [email or web address]  losartan 25 mg oral tablet: 1 tab(s) orally once a day  Metoprolol Tartrate 75 mg oral tablet: 1 tab(s) orally 2 times a day  omeprazole 40 mg oral delayed release capsule: 1 cap(s) orally once a day  Percocet 5/325 oral tablet: 1 tab(s) orally every 6 hours, As Needed  rifAMPin 300 mg oral capsule: 1 cap(s) orally 2 times a day

## 2022-06-15 NOTE — DISCHARGE NOTE PROVIDER - ATTENDING DISCHARGE PHYSICAL EXAMINATION:
Vital Signs Last 24 Hrs  T(C): 36.6 (15 Gabriel 2022 10:37), Max: 36.9 (14 Jun 2022 20:57)  T(F): 97.8 (15 Gabriel 2022 10:37), Max: 98.5 (14 Jun 2022 20:57)  HR: 88 (15 Gabriel 2022 10:37) (83 - 89)  BP: 119/83 (15 Gabriel 2022 10:37) (113/82 - 125/86)  BP(mean): --  RR: 19 (15 Gabriel 2022 10:37) (18 - 19)  SpO2: 98% (15 Gabriel 2022 10:37) (81% - 98%)    GENERAL: pt examined bedside, laying comfortably in bed in NAD  HEENT: NC/AT, moist oral mucosa, clear conjunctiva, sclera nonicteric  RESPIRATORY: Normal respiratory effort; CTA b/l, no wheezing, rhonchi, rales  CARDIOVASCULAR: RRR, normal S1 and S2, no murmur/rub/gallop  ABDOMEN: soft, NT/ND, normoactive bowel sounds, no rebound/guarding  MUSCLOSKELETAL:  spinal dressing CDI   EXTREMITIES: No cynaosis, no clubbing, no lower extremity edema; Peripheral pulses are 2+ bilaterally  PSYCH: affect appropriate and cooperative  NEUROLOGY: A+O to person, place, and time, decreased plantar sensation (chronic), no focal neurologic deficits appreciated   SKIN: No rashes or no palpable lesions

## 2022-06-15 NOTE — DISCHARGE NOTE PROVIDER - NSDCCPCAREPLAN_GEN_ALL_CORE_FT
PRINCIPAL DISCHARGE DIAGNOSIS  Diagnosis: Osteomyelitis  Assessment and Plan of Treatment: - Diskitis/Osteomyelitis/Epidural Abscess r/o Cord Compression/Cauda Equina c/b MSSA bacteremia  - s/p Laminectomy 6/9/22  - PICC inserted 6/14/22  - Continue with Cefazolin via PICC through 7/21/22  - Continue with PO rifampin through 7/7/22  - Follow up with Neurosurgery and ID in 1 week      SECONDARY DISCHARGE DIAGNOSES  Diagnosis: MSSA bacteremia  Assessment and Plan of Treatment: - PICC inserted 6/14/22  - Continue with Cefazolin via PICC through 7/21/22  - Continue with PO rifampin through 7/7/22  - Follow up with ID in 1 week    Diagnosis: Respiratory failure with hypoxia  Assessment and Plan of Treatment: - Secondary to COVID PNA  - Vaccinated x2  - Completed Remdesivir at [place]  - Follow up with PCP in 1 week      Diagnosis: HTN (hypertension)  Assessment and Plan of Treatment: - Continue on home regimen  - Follow up with PCP in 1 week    Diagnosis: Testicular cancer  Assessment and Plan of Treatment: - PETSCAN 6-7 mos ago negative for malignancy  - Follow up outpatient with [facility] as per routine

## 2022-06-15 NOTE — PROGRESS NOTE ADULT - REASON FOR ADMISSION
Epidural Abscess

## 2022-06-18 LAB
CULTURE RESULTS: SIGNIFICANT CHANGE UP
CULTURE RESULTS: SIGNIFICANT CHANGE UP
SPECIMEN SOURCE: SIGNIFICANT CHANGE UP
SPECIMEN SOURCE: SIGNIFICANT CHANGE UP

## 2022-06-22 NOTE — CDI QUERY NOTE - NSCDIOTHERTXTBX_GEN_ALL_CORE_HH
Clarification is needed on respiratory failure and covid 19 was during this inpatient admission or previous hospitalization at [facility]    Please clarify the status of respiratory failure and covid 19  A) Respiratory failure and covid 19 clinically significant this admission as monitored and placed on isolation   B) Respiratory failure and covid 19 not significant in this admission, prior to transfer and significant at [facility] admission only  C) Other, please specify  D) Not clinically significant    Supporting Documentation:    Discharge Note Provider [Charted Location: [address] [address]] [Authored: 15-Gabriel-2022 07:46]  Diagnosis: Respiratory failure with hypoxia  Assessment and Plan of Treatment: - Secondary to COVID PNA  - Vaccinated x2  - Completed Remdesivir at [facility]  - Follow up with PCP in 1 week    Progress Note Adult-Infectious Disease Attending [Charted Location: [address] [address]] [Authored: 11-Jun-2022 11:59]  COVID AT [place] 5/29  2 NEG SWABS   ISOLATION DISCONTINUED    REPEAT BLOOD CX NOW NEG   PLAN PICC LINE LONG TERM IV ABX  DR DAVID TO FOLLOWUP ON MONDAY

## 2022-07-01 ENCOUNTER — APPOINTMENT (OUTPATIENT)
Dept: NEUROSURGERY | Facility: CLINIC | Age: 62
End: 2022-07-01

## 2022-07-01 VITALS
HEART RATE: 101 BPM | WEIGHT: 188 LBS | BODY MASS INDEX: 26.32 KG/M2 | HEIGHT: 71 IN | DIASTOLIC BLOOD PRESSURE: 78 MMHG | SYSTOLIC BLOOD PRESSURE: 111 MMHG | OXYGEN SATURATION: 95 % | TEMPERATURE: 98.2 F

## 2022-07-01 DIAGNOSIS — Z86.79 PERSONAL HISTORY OF OTHER DISEASES OF THE CIRCULATORY SYSTEM: ICD-10-CM

## 2022-07-01 DIAGNOSIS — Z87.891 PERSONAL HISTORY OF NICOTINE DEPENDENCE: ICD-10-CM

## 2022-07-01 DIAGNOSIS — Z87.09 PERSONAL HISTORY OF OTHER DISEASES OF THE RESPIRATORY SYSTEM: ICD-10-CM

## 2022-07-01 DIAGNOSIS — Z82.49 FAMILY HISTORY OF ISCHEMIC HEART DISEASE AND OTHER DISEASES OF THE CIRCULATORY SYSTEM: ICD-10-CM

## 2022-07-01 DIAGNOSIS — Z72.89 OTHER PROBLEMS RELATED TO LIFESTYLE: ICD-10-CM

## 2022-07-01 PROCEDURE — 99024 POSTOP FOLLOW-UP VISIT: CPT

## 2022-07-04 NOTE — REASON FOR VISIT
[de-identified] : Hospital Course \par Discharge Date	15-Gabriel-2022 \par Admission Date	05-Jun-2022 00:21 \par Reason for Admission	Epidural Abscess \par Hospital Course	 \par 60 y/o M w/ PMH of HTN, DVT/ PE, Testicular CA s/p chemo/radiation/LN \par Dissection was admitted to  for Acute Hypoxic Respiratory Failure 2/2 COVID \par PNA w/ hospital course complicated by MSSA Bacteremia subsequently found to \par have Diskitis/Osteomyelitis and Epidural Abscess for which he was transferred \par to [facility] for IR and Neurosurgical evaluation/intervention.  Pt underwent \par laminectomy with abscess drainage and laminectomy on 06/06. Bcx x2 from 06/09 \par had been NGTD however 06/12 one of the bottles growing gram (+) cocci in \par clusters w/ MSSA on PCR, Bcx from 06/10 NGTD. Repeat Bcx 6/13/22 NGTD. PICC \par line inserted 6/14 and pt to continue abx therapy with cefazolin through \par 7/21/22 and rifampin PO through 7/7/22. Pt is now medically stable for \par discharge to [facility] with appropriate outpatient follow up. \par Patient presents today for wound check and removal of staples.  He presents and compliant with TLSO bracing.  He is accompanied by his friend and currently residing the [facility].  He endorses moderate thoracic region pain.  He is urinating without difficulty.  Ambulating with assistance of  a walker for support.  His incision is healing well.  PICC line intact.  Denies any fever or chills.

## 2022-07-04 NOTE — ASSESSMENT
[FreeTextEntry1] : Mr. Rene is doing well from the post operative perspective.   He is compliant with TLSO brace.  He continues to receive antibiotic therapy via PICC line.\par We will repeat MRI thoracic spine w/wo contrast around 7/14/2022 prior to discontinuation of antibiotic therapy.\par Antiinflammatory was recommended for management of symptoms and pain. Patient has been referred to physical therapy for strengthening and to decrease pain modalities and core strengthening.  We discussed the benefits of alternating heat, ice  and Icy Hot Cream.  Patient  may try gentle stretches at home in the interim.  Patient will follow up in 4-6 weeks for a formal clinical assessment and evaluate recovery.\par

## 2022-07-07 ENCOUNTER — APPOINTMENT (OUTPATIENT)
Dept: INTERNAL MEDICINE | Facility: CLINIC | Age: 62
End: 2022-07-07

## 2022-07-07 VITALS
TEMPERATURE: 97.7 F | DIASTOLIC BLOOD PRESSURE: 76 MMHG | SYSTOLIC BLOOD PRESSURE: 114 MMHG | WEIGHT: 187 LBS | HEART RATE: 88 BPM | HEIGHT: 71 IN | OXYGEN SATURATION: 96 % | BODY MASS INDEX: 26.18 KG/M2 | RESPIRATION RATE: 16 BRPM

## 2022-07-07 PROCEDURE — 99214 OFFICE O/P EST MOD 30 MIN: CPT

## 2022-07-07 RX ORDER — RIFAMPIN 300 MG/1
300 CAPSULE ORAL
Refills: 0 | Status: ACTIVE | COMMUNITY

## 2022-07-07 RX ORDER — CEFAZOLIN SODIUM 20 G/100ML
20 POWDER, FOR SOLUTION INTRAVENOUS
Refills: 0 | Status: ACTIVE | COMMUNITY

## 2022-07-07 NOTE — HISTORY OF PRESENT ILLNESS
[FreeTextEntry1] : This 61M with HTN, DVT/PE, Testicular CA s/p chemo/radiation/LN Dissection c/o SOB/RICKETTS and cough admitted to [facility] for Acute Hypoxic Respiratory Failure 2/2 COVID PNA. Treated with steroids and remdesivir. Found to have MSSA bacteremia treated with Ancef. Subsequently c/o low back pain had MRI WO which noted diskitis/osteomyelitis and epidural abscess. Patient evaluated by Neurosurgery at [place] who recommended IR intervention and transfer to [facility] on June 5, 2022.\par \par patient has been having back pain; recalls episode of severe back pain in a 4D movies experience of [place] in 10/2021. he went to see a spine Dr. In [place] Who dx him with arthritis of the back and treated with pain meds.about 2 weeks ago, he developed a boil in his RIGHT groin. He denies squeezing at it, but stated that it went away as quick as it came.\par \par Denies IVDU.\par no other skin conditions to report.\par \par MRI and CT scan from [facility] reviewed. MSSA identified in blood cultures on 5/29/22 x 2 sets but cleared on 5/31/22 x 2 sets, and 6/1/22 x 2 sets.\par \par At the hospital, he underwent T8-T9 laminectomy 6/6/22; 2 grams of vanco powder placed in the wound per\par note. OR cultures in were positive\par Repeat blood cx from 6/13/22 remain negative. PICC LINE ordered from 6/14/22\par \par \par He was to continueCefazolin 2 grams Q8H\par anticipate at least 6 weeks of IV antibiotics.\par thru 7/21/22\par - check weekly Labs - CBC CMP ESR CRP\par to be reviewed by medical officer at facility.\par \par Continue Rifampin 300mg PO BID, started on 6/9/22\par - will continue this thru 7/7/22\par \par Leaving the hospital, patient went to [facility].  He is doing well, except he is not happy at the nursing facility.  He reports that he still has a lot of back pain and where he is wearing his back brace.  He has no fevers.\par \par Labs reviewed.  Medication list also reviewed.  Labs showed that his ESR has been coming down on a weekly basis since June 20, ESR went from 82, 72, to 61 over the past 3 weeks.\par \par \par \par

## 2022-07-07 NOTE — DATA REVIEWED
[FreeTextEntry1] : \par =======================================================\par Labs:\par \par  11.4\par 6.59 )-----------( 411 ( 14 Jun 2022 07:01 )\par  35.5\par \par 06-14\par \par 137 | 97<L> | 9.9\par ----------------------------< 132<H>\par 3.9 | 28.0 | 1.04\par \par Ca 9.4 14 Jun 2022 07:01\par \par \par \par Culture - Blood (collected 06-13-22 @ 02:00)\par Source: .Blood Blood-Peripheral\par \par Culture - Blood (collected 06-13-22 @ 02:00)\par Source: .Blood Blood-Peripheral\par \par Culture - Blood (collected 06-10-22 @ 11:20)\par Source: .Blood Blood-Venous\par \par Culture - Blood (collected 06-09-22 @ 15:08)\par Source: .Blood Blood-Peripheral\par Gram Stain (06-12-22 @ 04:52):\par  Growth in aerobic bottle: Gram Positive Cocci in Clusters\par Final Report (06-14-22 @ 08:07):\par  Growth in aerobic bottle: Staphylococcus aureus\par Organism: Blood Culture PCR\par Staphylococcus aureus (06-14-22 @ 08:07)\par Organism: Staphylococcus aureus (06-14-22 @ 08:07)\par  Sensitivities:\par  - Ampicillin/Sulbactam: S <=8/4\par  - Cefazolin: S <=4\par  - Clindamycin: R <=0.25 This isolate is presumed to be clindamycin\par resistant based on detection of inducible resistance. Clindamycin may still be\par effective in some patients.\par  - Erythromycin: R >4\par  - Gentamicin: S <=1 Should not be used as monotherapy\par  - Oxacillin: S 0.5 Oxacillin predicts susceptibility for dicloxacillin,\par methicillin, and nafcillin\par  - Penicillin: R >8\par  - Rifampin: S <=1 Should not be used as monotherapy\par  - Tetra/Doxy: S <=1\par  - Trimethoprim/Sulfamethoxazole: S <=0.5/9.5\par  - Vancomycin: S 1\par  Method Type: JOVANI\par Organism: Blood Culture PCR (06-14-22 @ 08:07)\par  Sensitivities:\par  - Methicillin SENSITIVE Staphylococcus aureus (MSSA): Detec Any isolate of\par Staphylococcus aureus from a blood culture is NOT considered a contaminant.\par  Method Type: PCR\par \par Culture - Blood (collected 06-09-22 @ 15:08)\par Source: .Blood Blood-Peripheral\par Final Report (06-14-22 @ 16:00):\par  No Growth Final\par \par Culture - Blood (collected 06-07-22 @ 16:58)\par Source: .Blood Blood-Peripheral\par Final Report (06-12-22 @ 17:00):\par  No Growth Final\par \par Culture - Blood (collected 06-07-22 @ 16:58)\par Source: .Blood Blood-Peripheral\par Gram Stain (06-08-22 @ 21:50):\par  Growth in aerobic bottle: Gram Positive Cocci in Clusters\par Final Report (06-09-22 @ 16:51):\par  Growth in aerobic bottle: Staphylococcus aureus\par  See previous culture [id number]\par \par Culture - Acid Fast - Tissue w/Smear (collected 06-07-22 @ 03:39)\par Source: .Tissue epidural phlegmon\par \par Culture - Tissue with Gram Stain (collected 06-07-22 @ 02:26)\par Source: .Tissue epidural phlegmon\par Gram Stain (06-07-22 @ 03:24):\par  No polymorphonuclear leukocytes seen per low power field\par  No organisms seen per oil power field\par Final Report (06-11-22 @ 15:02):\par  Rare Staphylococcus aureus\par Organism: Staphylococcus aureus (06-11-22 @ 15:02)\par Organism: Staphylococcus aureus (06-11-22 @ 15:02)\par  Sensitivities:\par  - Ampicillin/Sulbactam: S <=8/4\par  - Cefazolin: S <=4\par  - Clindamycin: R 0.5 This isolate is presumed to be clindamycin resistant\par based on detection of inducible resistance. Clindamycin may still be effective\par in some patients.\par  - Erythromycin: R >4\par  - Gentamicin: S <=1 Should not be used as monotherapy\par  - Oxacillin: S 1 Oxacillin predicts susceptibility for dicloxacillin,\par methicillin, and nafcillin\par  - Penicillin: R >8\par  - Rifampin: S <=1 Should not be used as monotherapy\par  - Tetra/Doxy: S <=1\par  - Trimethoprim/Sulfamethoxazole: S <=0.5/9.5\par  - Vancomycin: S 2\par  Method Type: JOVANI\par \par Culture - Surgical Swab (collected 06-07-22 @ 02:15)\par Source: .Surgical Swab thoracic epidural (swab)\par Final Report (06-11-22 @ 15:38):\par  Rare Staphylococcus aureus\par Organism: Staphylococcus aureus (06-11-22 @ 15:38)\par Organism: Staphylococcus aureus (06-11-22 @ 15:38)\par  Sensitivities:\par  - Ampicillin/Sulbactam: S <=8/4\par  - Cefazolin: S <=4\par  - Clindamycin: R <=0.25 This isolate is presumed to be clindamycin\par resistant based on detection of inducible resistance. Clindamycin may still be\par effective in some patients.\par  - Erythromycin: R >4\par  - Gentamicin: S <=1 Should not be used as monotherapy\par  - Oxacillin: S 0.5 Oxacillin predicts susceptibility for dicloxacillin,\par methicillin, and nafcillin\par  - Penicillin: R >8\par  - Rifampin: S <=1 Should not be used as monotherapy\par  - Tetra/Doxy: S <=1\par  - Trimethoprim/Sulfamethoxazole: S <=0.5/9.5\par  - Vancomycin: S 2\par  Method Type: JOVANI\par \par Culture - Blood (collected 06-05-22 @ 02:06)\par Source: .Blood Blood-Peripheral\par Gram Stain (06-07-22 @ 09:08):\par  Growth in anaerobic bottle: Gram Positive Cocci in Clusters\par  ***Blood Panel PCR results on this specimen are available\par  approximately 3 hours after the Gram stain result.***\par  Gram stain, PCR, and/or culture results may not always\par  correspond due todifference in methodologies.\par  ************************************************************\par  This PCR assay was performed using [employer].\par  The following targets are tested for: Enterococcus,\par  vancomycin resistant enterococci, Listeria monocytogenes,\par  coagulase negative staphylococci, S. aureus,\par  methicillin resistant S. aureus, Streptococcus agalactiae\par  (Group B), S. pneumoniae, S. pyogenes (Group A),\par  Acinetobacter baumannii, Enterobacter cloacae, E. coli,\par  Klebsiella oxytoca, K. pneumoniae, Proteus sp.,\par  Serratia marcescens, Haemophilus influenzae,\par  Neisseria meningitidis, Pseudomonas aeruginosa, Candida\par  albicans, C. glabrata, C krusei, C parapsilosis,\par  C. tropicalis and the KPC resistance gene.\par  Gram Stain and BCID performed by:\par  [facility]\par  [address] [place] [id number]\par  .\par  TYPE: (C=Critical, N=Notification, A=Abnormal) C\par  TESTS: _ GS\par  DATE/TIME CALLED: _ 06/07/2022 09:07:29\par  CALLED TO: Johnie Up RN\par  READ BACK (2 Patient Identifiers)(Y/N): _ Y\par  READ BACK VALUES (Y/N): _ Y\par  CALLED BY: Johnie Gardner\par Final Report (06-09-22 @ 08:52):\par  Growth in anaerobic bottle: Staphylococcus aureus\par Organism: Blood Culture PCR\par Staphylococcus aureus (06-09-22 @ 16:50)\par Organism: Staphylococcus aureus (06-09-22 @ 16:50)\par  Sensitivities:\par  - Ampicillin/Sulbactam: S <=8/4\par  - Cefazolin: S <=4\par  - Clindamycin: R <=0.25 This isolate is presumed to be clindamycin\par resistant based on detection of inducible resistance. Clindamycin may still be\par effective in some patients.\par  - Erythromycin: R >4\par  - Gentamicin: S <=1 Should not be used as monotherapy\par  - Oxacillin: S 0.5 Oxacillin predicts susceptibility for dicloxacillin,\par methicillin, and nafcillin\par  - Penicillin: R >8\par  - Rifampin: S <=1 Should not be used as monotherapy\par  - Tetra/Doxy: S <=1\par  - Trimethoprim/Sulfamethoxazole: S <=0.5/9.5\par  - Vancomycin: S 1\par  Method Type: JOVANI\par Organism: Blood Culture PCR (06-09-22 @ 08:52)\par  Sensitivities:\par  - Methicillin SENSITIVE Staphylococcus aureus (MSSA): Detec Any isolate of\par Staphylococcus aureus from a blood culture is NOT considered a contaminant.\par  Method Type: PCR\par \par Culture - Blood (collected 06-05-22 @ 02:05)\par Source: .Blood Blood-Peripheral\par Final Report (06-10-22 @ 03:00):\par  No growth at 5 days.\par \par \par \par C-Reactive Protein, Serum: 23 mg/L (06-05-22 @ 02:05)\par \par Sedimentation Rate, Erythrocyte: 24 mm/hr (06-05-22 @ 02:05)\par \par Procalcitonin, Serum: 0.12 ng/mL (06-05-22 @ 02:05)\par \par COVID-19 PCR: NotDetec (06-12-22 @ 16:44)\par COVID-19 PCR: NotDetec (06-05-22 @ 17:07)\par

## 2022-07-07 NOTE — PHYSICAL EXAM
[General Appearance - Alert] : alert [General Appearance - In No Acute Distress] : in no acute distress [Sclera] : the sclera and conjunctiva were normal [PERRL With Normal Accommodation] : pupils were equal in size, round, reactive to light [Extraocular Movements] : extraocular movements were intact [Outer Ear] : the ears and nose were normal in appearance [Oropharynx] : the oropharynx was normal with no thrush [Neck Appearance] : the appearance of the neck was normal [Neck Cervical Mass (___cm)] : no neck mass was observed [Jugular Venous Distention Increased] : there was no jugular-venous distention [Thyroid Diffuse Enlargement] : the thyroid was not enlarged [Auscultation Breath Sounds / Voice Sounds] : lungs were clear to auscultation bilaterally [Heart Rate And Rhythm] : heart rate was normal and rhythm regular [Heart Sounds] : normal S1 and S2 [Heart Sounds Gallop] : no gallops [Murmurs] : no murmurs [Heart Sounds Pericardial Friction Rub] : no pericardial rub [Full Pulse] : the pedal pulses are present [Edema] : there was no peripheral edema [Bowel Sounds] : normal bowel sounds [Abdomen Soft] : soft [Abdomen Tenderness] : non-tender [Abdomen Mass (___ Cm)] : no abdominal mass palpated [Costovertebral Angle Tenderness] : no CVA tenderness [No Palpable Adenopathy] : no palpable adenopathy [Musculoskeletal - Swelling] : no joint swelling [Nail Clubbing] : no clubbing  or cyanosis of the fingernails [Motor Tone] : muscle strength and tone were normal [FreeTextEntry1] : Midline thoracic incision, sutures now removed.  Slightly erythematous at the suture points, but the rest of skin remains normal-looking.\par \par There is a large scab at the inferior pole of the suture line. [Skin Color & Pigmentation] : normal skin color and pigmentation [] : no rash [Cranial Nerves] : cranial nerves 2-12 were intact [Sensation] : the sensory exam was normal to light touch and pinprick [No Focal Deficits] : no focal deficits [Oriented To Time, Place, And Person] : oriented to person, place, and time [Affect] : the affect was normal

## 2022-07-07 NOTE — ASSESSMENT
[FreeTextEntry1] : This 61-year-old man with MSSA bacteremia, epidural abscess in the thoracic area status post drainage.\par \par He is due to complete his IV antibiotics on July 21, 2021.  At that time, he can transition to oral doxycycline 100 mg by mouth twice a day.\par \par He can then make a follow appointment with this office, we will obtain:\par CBC, CMP, ESR, CRP, blood cultures x2 sets.\par \par  \par Anticipate that patient will be on a 4-week course of doxycycline after his IV antibiotics.  And then we will monitor him off antibiotics.\par \par I have asked the patient to make a follow-up with this office after he leaves the rehab facility.

## 2022-07-14 ENCOUNTER — OUTPATIENT (OUTPATIENT)
Dept: OUTPATIENT SERVICES | Facility: HOSPITAL | Age: 62
LOS: 1 days | End: 2022-07-14
Payer: MEDICARE

## 2022-07-14 ENCOUNTER — APPOINTMENT (OUTPATIENT)
Dept: MRI IMAGING | Facility: CLINIC | Age: 62
End: 2022-07-14

## 2022-07-14 DIAGNOSIS — M46.20 OSTEOMYELITIS OF VERTEBRA, SITE UNSPECIFIED: ICD-10-CM

## 2022-07-14 DIAGNOSIS — G06.2 EXTRADURAL AND SUBDURAL ABSCESS, UNSPECIFIED: ICD-10-CM

## 2022-07-14 PROCEDURE — 72157 MRI CHEST SPINE W/O & W/DYE: CPT

## 2022-07-14 PROCEDURE — A9585: CPT

## 2022-07-14 PROCEDURE — 72157 MRI CHEST SPINE W/O & W/DYE: CPT | Mod: 26,MH

## 2022-07-19 ENCOUNTER — APPOINTMENT (OUTPATIENT)
Dept: NEUROSURGERY | Facility: CLINIC | Age: 62
End: 2022-07-19

## 2022-07-19 VITALS
TEMPERATURE: 98.3 F | DIASTOLIC BLOOD PRESSURE: 76 MMHG | OXYGEN SATURATION: 91 % | BODY MASS INDEX: 25.48 KG/M2 | HEART RATE: 76 BPM | WEIGHT: 182 LBS | HEIGHT: 71 IN | SYSTOLIC BLOOD PRESSURE: 111 MMHG

## 2022-07-19 PROCEDURE — 99024 POSTOP FOLLOW-UP VISIT: CPT

## 2022-07-19 RX ORDER — OMEPRAZOLE 40 MG/1
40 CAPSULE, DELAYED RELEASE ORAL
Qty: 30 | Refills: 0 | Status: ACTIVE | COMMUNITY
Start: 2022-04-28

## 2022-07-19 RX ORDER — ATORVASTATIN CALCIUM 10 MG/1
10 TABLET, FILM COATED ORAL
Qty: 90 | Refills: 0 | Status: ACTIVE | COMMUNITY
Start: 2022-04-25

## 2022-07-19 RX ORDER — CYCLOBENZAPRINE HYDROCHLORIDE 10 MG/1
10 TABLET, FILM COATED ORAL
Qty: 15 | Refills: 0 | Status: ACTIVE | COMMUNITY
Start: 2022-05-25

## 2022-07-19 RX ORDER — TADALAFIL 20 MG/1
20 TABLET ORAL
Qty: 6 | Refills: 0 | Status: ACTIVE | COMMUNITY
Start: 2022-03-31

## 2022-07-19 RX ORDER — HYDROCHLOROTHIAZIDE 12.5 MG/1
12.5 CAPSULE ORAL
Qty: 90 | Refills: 0 | Status: ACTIVE | COMMUNITY
Start: 2022-03-18

## 2022-07-19 RX ORDER — METOPROLOL TARTRATE 75 MG/1
75 TABLET, FILM COATED ORAL
Qty: 180 | Refills: 0 | Status: ACTIVE | COMMUNITY
Start: 2021-11-24

## 2022-07-19 RX ORDER — IPRATROPIUM BROMIDE 42 UG/1
0.06 SPRAY NASAL
Qty: 15 | Refills: 0 | Status: ACTIVE | COMMUNITY
Start: 2022-02-16

## 2022-07-19 RX ORDER — OXYCODONE AND ACETAMINOPHEN 5; 325 MG/1; MG/1
5-325 TABLET ORAL
Qty: 12 | Refills: 0 | Status: ACTIVE | COMMUNITY
Start: 2022-05-25

## 2022-07-19 RX ORDER — ERGOCALCIFEROL 1.25 MG/1
1.25 MG CAPSULE, LIQUID FILLED ORAL
Qty: 12 | Refills: 0 | Status: ACTIVE | COMMUNITY
Start: 2022-03-28

## 2022-07-21 NOTE — ASSESSMENT
[FreeTextEntry1] : Mr. Rene is doing well from the post operative perspective.   He is compliant with TLSO brace.  He continues to receive antibiotic therapy via PICC line. He will be transitioned to po antibiotics.  He continues to improve clinically.\par \par We will repeat a CT thoracic spine to assess the bony structures at L1/L2 to assess bony collapse. \par Plan for repeat MRI thoracic spine w/wo contrast 6 weeks post po antibiotics. \par Antiinflammatory was recommended for management of symptoms and pain. Patient has been referred to physical therapy for strengthening and to decrease pain modalities and core strengthening.  We discussed the benefits of alternating heat, ice  and Icy Hot Cream.  Patient  may try gentle stretches at home in the interim.  Patient will follow up in 4-6 weeks for a formal clinical assessment and evaluate recovery.\par

## 2022-07-21 NOTE — DATA REVIEWED
[de-identified] : Status post posterior decompression from T6-T7 to T9-T10. No acute fracture.\par \par Alignment: There is focal kyphosis at T7-T8. Mild anterolisthesis of T10 on T11. Minimal anterolisthesis of T11 on T12. Mild retrolisthesis of L1 on L2.\par \par Disc spaces: Worsening discitis/osteomyelitis at T7-T8. Mild multilevel degenerative disc disease throughout the thoracic spine, similar to prior study. Worsening T2 signal within the disc space at L1-L2 in the area of questionable discitis/osteomyelitis, which is suboptimally evaluated on this examination, as it is on the edge of field the field-of-view.\par \par Spinal cord: No cord signal abnormality is seen.\par \par Paraspinal musculature: Enhancement along the posterior surgical bed, which may be postoperative.\par \par Soft tissues: Indeterminate T2 hyperintense, enhancing lesions within the thyroid bilaterally, measuring up to 2.8 cm. Bibasilar atelectasis. Bilateral renal cysts noted on  images. Incompletely evaluated, indeterminate cystic lesion along the abdominal aorta seen on  images, better seen on abdomen/pelvis CT dated 5/25/2022.\par \par Spinal canal/neural foramina:\par \par Epidural abscess has improved when compared to prior study with residual enhancing ventral epidural phlegmon, extending from T6 to T9.\par \par No significant canal stenosis seen at any level, improved from prior study.\par \par At T6-T7, there is mild bilateral neural foraminal narrowing due to facet arthrosis, similar to the prior study.\par \par At T7-T8, there is mild/moderate right and moderate left neural foraminal narrowing, related to epidural phlegmon, worse from the prior study.\par \par At T8-T9, there is mild/moderate bilateral neural foraminal narrowing, which is progressed from the prior study, likely due to epidural phlegmon.\par \par No significant neuroforaminal stenosis seen at any other level.\par \par Impression:\par \par Worsening discitis/osteomyelitis of the T7-T8 disc space with possible involvement of bilateral facet joints at this level.\par \par Interval posterior decompression extending from T6-T7 to T9-T10. Interval improvement in ventral epidural abscess at the level of discitis/osteomyelitis with residual ventral epidural phlegmon extending from T6 to T9.\par \par No significant central canal stenosis seen at any level. Mild worsening of neural foraminal narrowing at T7-T8 and T8-T9, described above, likely related to epidural phlegmon.\par \par Worsening T2 signal within the disc space at L1-L2 in the area of questionable discitis/osteomyelitis seen on prior lumbar spine MRI, which is suboptimally evaluated on this examination, as it is on the edge of field the field-of-view. Dedicated lumbar spine MRI without and with contrast can be completed for further evaluation as clinically indicated.\par \par Indeterminate T2 hyperintense, enhancing lesions within the thyroid bilaterally. Recommend further evaluation with thyroid ultrasound.\par \par Incompletely evaluated indeterminate cystic lesion along the abdominal aorta, better seen on prior CT dated 5/25/2022.

## 2022-07-21 NOTE — REASON FOR VISIT
[Follow-Up: _____] : a [unfilled] follow-up visit [de-identified] : Hospital Course \par Discharge Date	15-Gabriel-2022 \par Admission Date	05-Jun-2022 00:21 \par Reason for Admission	Epidural Abscess \par Hospital Course	 \par 62 y/o M w/ PMH of HTN, DVT/ PE, Testicular CA s/p chemo/radiation/LN \par Dissection was admitted to  for Acute Hypoxic Respiratory Failure 2/2 COVID \par PNA w/ hospital course complicated by MSSA Bacteremia subsequently found to \par have Diskitis/Osteomyelitis and Epidural Abscess for which he was transferred \par to [facility] for IR and Neurosurgical evaluation/intervention.  Pt underwent \par laminectomy with abscess drainage and laminectomy on 06/06. Bcx x2 from 06/09 \par had been NGTD however 06/12 one of the bottles growing gram (+) cocci in \par clusters w/ MSSA on PCR, Bcx from 06/10 NGTD. Repeat Bcx 6/13/22 NGTD. PICC \par line inserted 6/14 and pt to continue abx therapy with cefazolin through \par 7/21/22 and rifampin PO through 7/7/22. Pt is now medically stable for \par discharge to [facility] with appropriate outpatient follow up. \par Patient presents today for wound check and removal of staples.  He presents and compliant with TLSO bracing.  He is accompanied by his friend and currently residing the [facility].  He endorses moderate thoracic region pain.  He is urinating without difficulty.  Ambulating with assistance of  a walker for support.  His incision is healing well.  PICC line intact.  Denies any fever or chills.

## 2022-07-27 LAB
CULTURE RESULTS: SIGNIFICANT CHANGE UP
SPECIMEN SOURCE: SIGNIFICANT CHANGE UP

## 2022-07-28 ENCOUNTER — APPOINTMENT (OUTPATIENT)
Dept: CT IMAGING | Facility: CLINIC | Age: 62
End: 2022-07-28

## 2022-07-28 PROCEDURE — 72128 CT CHEST SPINE W/O DYE: CPT | Mod: MH

## 2022-08-02 ENCOUNTER — NON-APPOINTMENT (OUTPATIENT)
Age: 62
End: 2022-08-02

## 2022-08-02 RX ORDER — METHOCARBAMOL 500 MG/1
500 TABLET, FILM COATED ORAL 3 TIMES DAILY
Qty: 90 | Refills: 2 | Status: ACTIVE | COMMUNITY
Start: 2022-08-02 | End: 1900-01-01

## 2022-08-29 ENCOUNTER — APPOINTMENT (OUTPATIENT)
Dept: NEUROSURGERY | Facility: CLINIC | Age: 62
End: 2022-08-29

## 2022-08-30 ENCOUNTER — APPOINTMENT (OUTPATIENT)
Dept: INTERNAL MEDICINE | Facility: CLINIC | Age: 62
End: 2022-08-30
Payer: MEDICARE

## 2022-08-30 VITALS
TEMPERATURE: 97.2 F | HEART RATE: 80 BPM | WEIGHT: 192 LBS | SYSTOLIC BLOOD PRESSURE: 134 MMHG | DIASTOLIC BLOOD PRESSURE: 86 MMHG | BODY MASS INDEX: 26.88 KG/M2 | HEIGHT: 71 IN | OXYGEN SATURATION: 98 %

## 2022-08-30 PROCEDURE — 99215 OFFICE O/P EST HI 40 MIN: CPT | Mod: 25

## 2022-08-30 PROCEDURE — 36415 COLL VENOUS BLD VENIPUNCTURE: CPT

## 2022-09-01 LAB
ALBUMIN SERPL ELPH-MCNC: 5 G/DL
ALP BLD-CCNC: 117 U/L
ALT SERPL-CCNC: 17 U/L
ANION GAP SERPL CALC-SCNC: 16 MMOL/L
AST SERPL-CCNC: 21 U/L
BASOPHILS # BLD AUTO: 0.03 K/UL
BASOPHILS NFR BLD AUTO: 0.5 %
BILIRUB SERPL-MCNC: 0.5 MG/DL
BUN SERPL-MCNC: 20 MG/DL
CALCIUM SERPL-MCNC: 9.8 MG/DL
CHLORIDE SERPL-SCNC: 104 MMOL/L
CO2 SERPL-SCNC: 22 MMOL/L
CREAT SERPL-MCNC: 1.25 MG/DL
CRP SERPL-MCNC: <3 MG/L
EGFR: 65 ML/MIN/1.73M2
EOSINOPHIL # BLD AUTO: 0.35 K/UL
EOSINOPHIL NFR BLD AUTO: 6.4 %
GLUCOSE SERPL-MCNC: 117 MG/DL
HCT VFR BLD CALC: 45.3 %
HGB BLD-MCNC: 14.8 G/DL
IMM GRANULOCYTES NFR BLD AUTO: 0.4 %
LYMPHOCYTES # BLD AUTO: 1.65 K/UL
LYMPHOCYTES NFR BLD AUTO: 30.2 %
MAN DIFF?: NORMAL
MCHC RBC-ENTMCNC: 28.9 PG
MCHC RBC-ENTMCNC: 32.7 GM/DL
MCV RBC AUTO: 88.5 FL
MONOCYTES # BLD AUTO: 0.44 K/UL
MONOCYTES NFR BLD AUTO: 8 %
NEUTROPHILS # BLD AUTO: 2.98 K/UL
NEUTROPHILS NFR BLD AUTO: 54.5 %
PLATELET # BLD AUTO: 237 K/UL
POTASSIUM SERPL-SCNC: 3.9 MMOL/L
PROT SERPL-MCNC: 7.4 G/DL
RBC # BLD: 5.12 M/UL
RBC # FLD: 15.4 %
SODIUM SERPL-SCNC: 142 MMOL/L
WBC # FLD AUTO: 5.47 K/UL

## 2022-09-08 LAB
BACTERIA BLD CULT: NORMAL
BACTERIA BLD CULT: NORMAL

## 2022-10-04 ENCOUNTER — RESULT REVIEW (OUTPATIENT)
Age: 62
End: 2022-10-04

## 2022-10-04 ENCOUNTER — APPOINTMENT (OUTPATIENT)
Dept: MRI IMAGING | Facility: CLINIC | Age: 62
End: 2022-10-04

## 2022-10-04 PROCEDURE — 72157 MRI CHEST SPINE W/O & W/DYE: CPT | Mod: MH

## 2022-10-04 PROCEDURE — A9585: CPT

## 2022-10-06 ENCOUNTER — APPOINTMENT (OUTPATIENT)
Dept: NEUROSURGERY | Facility: CLINIC | Age: 62
End: 2022-10-06

## 2022-10-06 VITALS
HEART RATE: 70 BPM | OXYGEN SATURATION: 96 % | BODY MASS INDEX: 26.6 KG/M2 | WEIGHT: 190 LBS | TEMPERATURE: 98 F | SYSTOLIC BLOOD PRESSURE: 126 MMHG | HEIGHT: 71 IN | DIASTOLIC BLOOD PRESSURE: 83 MMHG

## 2022-10-06 PROCEDURE — 99213 OFFICE O/P EST LOW 20 MIN: CPT

## 2022-10-10 ENCOUNTER — NON-APPOINTMENT (OUTPATIENT)
Age: 62
End: 2022-10-10

## 2022-10-10 NOTE — REASON FOR VISIT
[Follow-Up: _____] : a [unfilled] follow-up visit [de-identified] : Hospital Course \par Discharge Date	15-Gabriel-2022 \par Admission Date	05-Jun-2022 00:21 \par Reason for Admission	Epidural Abscess \par Hospital Course	 \par 62 y/o M w/ PMH of HTN, DVT/ PE, Testicular CA s/p chemo/radiation/LN \par Dissection was admitted to  for Acute Hypoxic Respiratory Failure 2/2 COVID \par PNA w/ hospital course complicated by MSSA Bacteremia subsequently found to \par have Diskitis/Osteomyelitis and Epidural Abscess for which he was transferred \par to [facility] for IR and Neurosurgical evaluation/intervention.  Pt underwent \par laminectomy with abscess drainage and laminectomy on 06/06. Bcx x2 from 06/09 \par had been NGTD however 06/12 one of the bottles growing gram (+) cocci in \par clusters w/ MSSA on PCR, Bcx from 06/10 NGTD. Repeat Bcx 6/13/22 NGTD. PICC \par line inserted 6/14 and pt to continue abx therapy with cefazolin through \par 7/21/22 and rifampin PO through 7/7/22. Pt is now medically stable for \par discharge to [facility] with appropriate outpatient follow up. \par Patient presents today for wound check and removal of staples.  He presents and compliant with TLSO bracing.  Since his last visit he has discontinued the use of his PICC line and is no longer on antibiotic therapy.  He has discontinued the use of his TLSO brace as well.  He is ambulating independently.  He has minimal thoracic region pain.  He has not been having any fever or chills.  He has now been able to ambulate without any issues and has returned to most of his baseline activities.

## 2022-10-10 NOTE — DATA REVIEWED
[de-identified] : Status post posterior decompression from T6-T7 to T9-T10. No acute fracture.\par \par Alignment: There is focal kyphosis at T7-T8. Mild anterolisthesis of T10 on T11. Minimal anterolisthesis of T11 on T12. Mild retrolisthesis of L1 on L2.\par \par Disc spaces: Worsening discitis/osteomyelitis at T7-T8. Mild multilevel degenerative disc disease throughout the thoracic spine, similar to prior study. Worsening T2 signal within the disc space at L1-L2 in the area of questionable discitis/osteomyelitis, which is suboptimally evaluated on this examination, as it is on the edge of field the field-of-view.\par \par Spinal cord: No cord signal abnormality is seen.\par \par Paraspinal musculature: Enhancement along the posterior surgical bed, which may be postoperative.\par \par Soft tissues: Indeterminate T2 hyperintense, enhancing lesions within the thyroid bilaterally, measuring up to 2.8 cm. Bibasilar atelectasis. Bilateral renal cysts noted on  images. Incompletely evaluated, indeterminate cystic lesion along the abdominal aorta seen on  images, better seen on abdomen/pelvis CT dated 5/25/2022.\par \par Spinal canal/neural foramina:\par \par Epidural abscess has improved when compared to prior study with residual enhancing ventral epidural phlegmon, extending from T6 to T9.\par \par No significant canal stenosis seen at any level, improved from prior study.\par \par At T6-T7, there is mild bilateral neural foraminal narrowing due to facet arthrosis, similar to the prior study.\par \par At T7-T8, there is mild/moderate right and moderate left neural foraminal narrowing, related to epidural phlegmon, worse from the prior study.\par \par At T8-T9, there is mild/moderate bilateral neural foraminal narrowing, which is progressed from the prior study, likely due to epidural phlegmon.\par \par No significant neuroforaminal stenosis seen at any other level.\par \par Impression:\par \par Worsening discitis/osteomyelitis of the T7-T8 disc space with possible involvement of bilateral facet joints at this level.\par \par Interval posterior decompression extending from T6-T7 to T9-T10. Interval improvement in ventral epidural abscess at the level of discitis/osteomyelitis with residual ventral epidural phlegmon extending from T6 to T9.\par \par No significant central canal stenosis seen at any level. Mild worsening of neural foraminal narrowing at T7-T8 and T8-T9, described above, likely related to epidural phlegmon.\par \par Worsening T2 signal within the disc space at L1-L2 in the area of questionable discitis/osteomyelitis seen on prior lumbar spine MRI, which is suboptimally evaluated on this examination, as it is on the edge of field the field-of-view. Dedicated lumbar spine MRI without and with contrast can be completed for further evaluation as clinically indicated.\par \par Indeterminate T2 hyperintense, enhancing lesions within the thyroid bilaterally. Recommend further evaluation with thyroid ultrasound.\par \par Incompletely evaluated indeterminate cystic lesion along the abdominal aorta, better seen on prior CT dated 5/25/2022.

## 2022-10-10 NOTE — ASSESSMENT
[FreeTextEntry1] : Mr. Rene is doing well from the post operative perspective.  Patient is neurologically intact on today's exam.  He has discontinued the use of his TLSO brace since his last visit.  He is currently involved in physical therapy and tolerating well.  He is happy with his surgical results and now able to tolerate his routine activities at home.  I have educated him on just slowly reintegrating into his normal duties.\par I have personally reviewed the MRI of the thoracic spine which reveals significant improvement of his osteomyelitis.  Clinically he is doing well and I do not need any additional pictures at this time.\par \par Antiinflammatory was recommended for management of symptoms and pain. Patient has been referred to physical therapy for strengthening and to decrease pain modalities and core strengthening.  We discussed the benefits of alternating heat, ice  and Icy Hot Cream.  Patient  may try gentle stretches at home in the interim.  Patient will follow up in 4-6 weeks for a formal clinical assessment and evaluate recovery.\par Patient has been given an opportunity to ask and have their questions answered to their satisfaction.\par Patient knows to call the office if there are any new or worsening symptoms.\par \par

## 2022-10-26 RX ORDER — METHOCARBAMOL 500 MG/1
500 TABLET, FILM COATED ORAL 3 TIMES DAILY
Qty: 15 | Refills: 0 | Status: ACTIVE | COMMUNITY
Start: 2022-10-26 | End: 1900-01-01

## 2022-10-28 ENCOUNTER — APPOINTMENT (OUTPATIENT)
Dept: INTERNAL MEDICINE | Facility: CLINIC | Age: 62
End: 2022-10-28

## 2022-10-28 VITALS
SYSTOLIC BLOOD PRESSURE: 132 MMHG | OXYGEN SATURATION: 97 % | BODY MASS INDEX: 27.44 KG/M2 | HEART RATE: 61 BPM | DIASTOLIC BLOOD PRESSURE: 92 MMHG | HEIGHT: 71 IN | TEMPERATURE: 97.2 F | WEIGHT: 196 LBS

## 2022-10-28 PROCEDURE — 99214 OFFICE O/P EST MOD 30 MIN: CPT

## 2022-10-28 RX ORDER — DICLOFENAC SODIUM 1% 10 MG/G
1 GEL TOPICAL
Qty: 100 | Refills: 0 | Status: ACTIVE | COMMUNITY
Start: 2022-10-19

## 2022-10-28 RX ORDER — ALLOPURINOL 100 MG/1
100 TABLET ORAL
Qty: 90 | Refills: 0 | Status: ACTIVE | COMMUNITY
Start: 2022-08-12

## 2022-10-28 NOTE — PHYSICAL EXAM
[General Appearance - Alert] : alert [General Appearance - In No Acute Distress] : in no acute distress [General Appearance - Well-Appearing] : healthy appearing [Sclera] : the sclera and conjunctiva were normal [PERRL With Normal Accommodation] : pupils were equal in size, round, reactive to light [Extraocular Movements] : extraocular movements were intact [Outer Ear] : the ears and nose were normal in appearance [Oropharynx] : the oropharynx was normal with no thrush [Neck Appearance] : the appearance of the neck was normal [Exaggerated Use Of Accessory Muscles For Inspiration] : no accessory muscle use [Edema] : there was no peripheral edema [Musculoskeletal - Swelling] : no joint swelling [] : no rash [FreeTextEntry1] : \par Laminectomy scar clean and intact [Motor Exam] : the motor exam was normal [No Focal Deficits] : no focal deficits [Oriented To Time, Place, And Person] : oriented to person, place, and time [Affect] : the affect was normal

## 2022-10-28 NOTE — HISTORY OF PRESENT ILLNESS
[FreeTextEntry1] : 62-year-old male recently hospitalized at [facility] was admitted July 5 and discharged on 15th after he underwent treatment for MSSA bacteremia complicated by discitis osteomyelitis and epidural abscess at the T7/8 region.  Patient underwent T6-9 laminectomy on June 6.  Patient was on IV antibiotics from the hospital and discharged with IV cefazolin 2 g every 8 hours for 6 weeks which she completed on July 21, 2022.  He was also on rifampin from June 9 to July 7.  He followed up with Dr Anand in the office on July 7, 2022.  He was told to start doxycycline 100 mg twice daily after completion of the IV antibiotics.  Patient started doxycycline on July 22, 2022.  He is here for follow-up.  He reports no complaints.  He followed up with neurosurgery since hospital discharge.  He had a CT scan of the thoracic spine on July 28, 2022 with findings of discitis osteomyelitis at the same region.  He reports no fever no chills.  Denies any night sweats.  No weight loss.

## 2022-10-28 NOTE — ASSESSMENT
[FreeTextEntry1] : 61-year-old male here for follow-up of MSSA bacteremia epidural abscess complicated with osteomyelitis discitis of the thoracic region.  He is status post laminectomy T6-9 June 6, 2022.\par \par MRSA bacteremia\par Epidural of the thoracic spine\par Osteomyelitis/discitis thoracic spine\par Status post laminectomy T6-9 on June 6, 2022\par \par Recommendation:\par We will continue doxycycline 100 mg twice a day\par Labs from prior visit reviewed \par Imaging reviewed.\par Neurosurgery note reviewed.\par Hospital chart reviewed.  \par \par Follow-up in 3 month\par \par \par Counseling included lab results, differential diagnosis, treatment options, risks and benefits, lifestyle changes, current condition, medications and dose adjustments.\par The patient was interactive attentive and asked questions and verbalized understanding.\par \par

## 2022-10-31 RX ORDER — METHOCARBAMOL 500 MG/1
500 TABLET, FILM COATED ORAL 3 TIMES DAILY
Qty: 90 | Refills: 2 | Status: ACTIVE | COMMUNITY
Start: 2022-10-31 | End: 1900-01-01

## 2023-01-24 ENCOUNTER — APPOINTMENT (OUTPATIENT)
Dept: INTERNAL MEDICINE | Facility: CLINIC | Age: 63
End: 2023-01-24
Payer: MEDICARE

## 2023-01-24 VITALS
BODY MASS INDEX: 28.28 KG/M2 | SYSTOLIC BLOOD PRESSURE: 133 MMHG | HEIGHT: 71 IN | HEART RATE: 89 BPM | OXYGEN SATURATION: 99 % | WEIGHT: 202 LBS | TEMPERATURE: 97.6 F | DIASTOLIC BLOOD PRESSURE: 96 MMHG

## 2023-01-24 PROCEDURE — 99214 OFFICE O/P EST MOD 30 MIN: CPT | Mod: 25

## 2023-01-24 PROCEDURE — 36415 COLL VENOUS BLD VENIPUNCTURE: CPT

## 2023-01-24 NOTE — HISTORY OF PRESENT ILLNESS
[FreeTextEntry1] : 62-year-old male recently hospitalized at [facility] was admitted July 5 and discharged on 15th after he underwent treatment for MSSA bacteremia complicated by discitis osteomyelitis and epidural abscess at the T7/8 region.  Patient underwent T6-9 laminectomy on June 6.  Patient was on IV antibiotics from the hospital and discharged with IV cefazolin 2 g every 8 hours for 6 weeks which she completed on July 21, 2022.  He was also on rifampin from June 9 to July 7.  He followed up with Dr Anand in the office on July 7, 2022.  He was told to start doxycycline 100 mg twice daily after completion of the IV antibiotics.  Patient started doxycycline on July 22, 2022.  He is here for follow-up.  He reports no complaints.  He followed up with neurosurgery since hospital discharge.  He had a CT scan of the thoracic spine on July 28, 2022 with findings of discitis osteomyelitis at the same region.  He reports no fever no chills.  Denies any night sweats.  No weight loss.

## 2023-01-24 NOTE — ASSESSMENT
[FreeTextEntry1] : 61-year-old male here for follow-up of MSSA bacteremia epidural abscess complicated with osteomyelitis discitis of the thoracic region.  He is status post laminectomy T6-9 June 6, 2022.\par \par MRSA bacteremia\par Epidural of the thoracic spine\par Osteomyelitis/discitis thoracic spine\par Status post laminectomy T6-9 on June 6, 2022\par \par Recommendation:\par We will continue doxycycline 100 mg twice a day\par Labs from prior visit reviewed \par Imaging reviewed.\par \par \par Follow-up in 3-6 month\par \par \par Counseling included lab results, differential diagnosis, treatment options, risks and benefits, lifestyle changes, current condition, medications and dose adjustments.\par The patient was interactive attentive and asked questions and verbalized understanding.\par \par

## 2023-01-26 NOTE — PROGRESS NOTE ADULT - SUBJECTIVE AND OBJECTIVE BOX
HPI:  HPI:  60 y/o male w/ Hx of HTN, DVT/PE, Testicular CA s/p chemo/radiation, transferred to [facility] from [facility] where he was treated for hypoxic respiratory failure 2/2 COVID, found to have MSSA bacteremia, c/o back pain, had imaging which showed T6-10 phlegmon and epidural abscess T7-8. Pt now s/p T6-9 multilevel thoracolumbar laminectomy for evacuation of epidural abscess POD9.    PMHX: HTN, DVT/PE, Testicular CA s/p chemo/radiation/LN Dissection  PSHX: Radical Orchiectomy, Hip Replacement, Hernia Repair  FamHx: +Mother CA +Mother DM2, +Father CAD  Social Hx: Former Smoker quit 20 years ago  NKDA (05 Jun 2022 02:52)      INTERVAL HPI/OVERNIGHT EVENTS:  60 y/o Male s/p T6-9 multilevel thoracolumbar laminectomy for evacuation of epidural abscess POD9 seen lying comfortably in bed. Tolerating diet. Passing gas. Voiding.  Patient reports ambulating with little to no difficulty.  Primary dressing was changed at bedside.  Denies headache, balance issues, dizziness, weakness, numbness, n/v, fevers, chills, chest pain, SOB.     Vital Signs Last 24 Hrs  T(C): 36.6 (15 Gabriel 2022 10:37), Max: 36.9 (14 Jun 2022 20:57)  T(F): 97.8 (15 Gabriel 2022 10:37), Max: 98.5 (14 Jun 2022 20:57)  HR: 88 (15 Gabriel 2022 10:37) (83 - 89)  BP: 119/83 (15 Gabriel 2022 10:37) (113/82 - 125/86)  BP(mean): --  RR: 19 (15 Gabriel 2022 10:37) (18 - 19)  SpO2: 98% (15 Gabriel 2022 10:37) (81% - 98%)    PHYSICAL EXAM:  GENERAL: NAD, well-groomed  HEAD:  Atraumatic, normocephalic  WOUND: Primary appropriately soiled. Incision site without erythema, purulent drainage,   DOMINICK COMA SCORE: 15  MENTAL STATUS: AAO x3; Awake; Opens eyes spontaneously; Appropriately conversant without aphasia; following simple commands  REFLEXES: PERRL. PERRL. Corneals intact b/l.  MOTOR: Delt, bicep, tricep, hand , finger abduction strength 5/5 b/l. Hip flexion/extension, knee extension, dorsiflexion, plantar flexion strength 5/5 b/l.  SENSATION: Grossly intact to light touch all extremities and entire torso  COORDINATION: Gait intact  EXTREMITIES:  No clubbing, cyanosis, calf tenderness, and edema  SKIN: Warm, dry; no rashes or lesions    LABS:                        11.4   6.59  )-----------( 411      ( 14 Jun 2022 07:01 )             35.5     06-14    137  |  97<L>  |  9.9  ----------------------------<  132<H>  3.9   |  28.0  |  1.04    Ca    9.4      14 Jun 2022 07:01            06-14 @ 07:01  -  06-15 @ 07:00  --------------------------------------------------------  IN: 0 mL / OUT: 1600 mL / NET: -1600 mL        RADIOLOGY & ADDITIONAL TESTS:   Massage Therapy Progress Note      Visit type: Office Visit    Visit Sequence: [facility]     Prenatal Table Massage     Length of treatment: 30-minute, FREE PRENATAL MASSAGE     Authorization: Patient request    Reviewed contraindications/current health status with Patient, Epic chart:   Patient Active Problem List   Diagnosis   • Psychiatric symptoms   • Hx of preeclampsia, prior pregnancy, currently pregnant   • Depression   • Lost custody of children   • Pregnancy   • Tobacco use   • Supervision of other normal pregnancy, antepartum   • Fall (on) (from) other stairs and steps, initial encounter     WBC (K/mcL)   Date Value   01/16/2023 9.1     RBC (mil/mcL)   Date Value   01/16/2023 3.73 (L)     HCT (%)   Date Value   01/16/2023 35.4 (L)     HGB (g/dL)   Date Value   01/16/2023 11.6 (L)     PLT (K/mcL)   Date Value   01/16/2023 198         No Contraindications to prenatal massage therapy exist  Gentle, nurturing Prenatal Massage Therapy     Subjective:  Patient complains of:  Low back pain, Stress and back pain; patient states that she is pretty stressed, worrying about being admitted to the hospital in the right time, compleins about pain in her body and says that pain in the back started with later weeks of pregnancy; patient would like to relax and reduce pain in her back;     Pain report prior to treatment:  6/10, stress level: 6/10    Type of treatment requested: Prenatal massage    Specific requests:  Back       Objective Observations:  Hypertonicity noted in:  Levator scapula Bilateral and Quadratus lumborum Bilateral    Hypotonicity/Ischemia noted in: None noted    Decreased ROM noted in No areas    Additional observations:  Pleasant affect and in pain and stressed; patient was able to relax and enjoy and benefit from the treatment today    Assessment:  Patient received Gentle comfort massage techniques to affected tissues.    Additional treatment provided: hospital lotion     Response to treatment:  Patient's breathing slowed, Patient gave positive verbal feedback and relaxed and enjoyed the therapy, appreciated     Pain report after treatment:3/10, stress level: 3/10    Education/Resources: Infant Massage class, PT for back pain     Outcomes:follow up     Plan/Recommendations:  Massage Therapy if appropriate to relax and reduce pain in back muscles and for overall wellbeing   Session concluded   HPI:  HPI:  60 y/o male w/ Hx of HTN, DVT/PE, Testicular CA s/p chemo/radiation, transferred to [facility] from [facility] where he was treated for hypoxic respiratory failure 2/2 COVID, found to have MSSA bacteremia, c/o back pain, had imaging which showed T6-10 phlegmon and epidural abscess T7-8. Pt now s/p T6-9 multilevel thoracolumbar laminectomy for evacuation of epidural abscess POD9.  Patient seen and examined at bedside.    PMHX: HTN, DVT/PE, Testicular CA s/p chemo/radiation/LN Dissection  PSHX: Radical Orchiectomy, Hip Replacement, Hernia Repair  FamHx: +Mother CA +Mother DM2, +Father CAD  Social Hx: Former Smoker quit 20 years ago  NKDA (05 Jun 2022 02:52)      INTERVAL HPI/OVERNIGHT EVENTS:  60 y/o Male s/p T6-9 multilevel thoracolumbar laminectomy for evacuation of epidural abscess POD9 seen lying comfortably in bed. Tolerating diet. Passing gas. Voiding.  Patient reports ambulating with little to no difficulty.  Primary dressing was changed at bedside.  Denies headache, balance issues, dizziness, weakness, numbness, n/v, fevers, chills, chest pain, SOB.     Vital Signs Last 24 Hrs  T(C): 36.6 (15 Gabriel 2022 10:37), Max: 36.9 (14 Jun 2022 20:57)  T(F): 97.8 (15 Gabriel 2022 10:37), Max: 98.5 (14 Jun 2022 20:57)  HR: 88 (15 Gabriel 2022 10:37) (83 - 89)  BP: 119/83 (15 Gabriel 2022 10:37) (113/82 - 125/86)  BP(mean): --  RR: 19 (15 Gabriel 2022 10:37) (18 - 19)  SpO2: 98% (15 Gabriel 2022 10:37) (81% - 98%)    PHYSICAL EXAM:  GENERAL: NAD, well-groomed  HEAD:  Atraumatic, normocephalic  WOUND: Primary appropriately soiled. Incision site without erythema, purulent drainage,   DOMINICK COMA SCORE: 15  MENTAL STATUS: AAO x3; Awake; Opens eyes spontaneously; Appropriately conversant without aphasia; following simple commands  REFLEXES: PERRL. PERRL. Corneals intact b/l.  MOTOR: Delt, bicep, tricep, hand , finger abduction strength 5/5 b/l. Hip flexion/extension, knee extension, dorsiflexion, plantar flexion strength 5/5 b/l.  SENSATION: Grossly intact to light touch all extremities and entire torso  COORDINATION: Gait intact  EXTREMITIES:  No clubbing, cyanosis, calf tenderness, and edema  SKIN: Warm, dry; no rashes or lesions    LABS:                        11.4   6.59  )-----------( 411      ( 14 Jun 2022 07:01 )             35.5     06-14    137  |  97<L>  |  9.9  ----------------------------<  132<H>  3.9   |  28.0  |  1.04    Ca    9.4      14 Jun 2022 07:01            06-14 @ 07:01  -  06-15 @ 07:00  --------------------------------------------------------  IN: 0 mL / OUT: 1600 mL / NET: -1600 mL        RADIOLOGY & ADDITIONAL TESTS:

## 2023-01-27 LAB
ALBUMIN SERPL ELPH-MCNC: 4.8 G/DL
ALP BLD-CCNC: 97 U/L
ALT SERPL-CCNC: 25 U/L
ANION GAP SERPL CALC-SCNC: 11 MMOL/L
AST SERPL-CCNC: 30 U/L
BASOPHILS # BLD AUTO: 0.04 K/UL
BASOPHILS NFR BLD AUTO: 0.8 %
BILIRUB SERPL-MCNC: 0.6 MG/DL
BUN SERPL-MCNC: 19 MG/DL
CALCIUM SERPL-MCNC: 10.3 MG/DL
CHLORIDE SERPL-SCNC: 102 MMOL/L
CO2 SERPL-SCNC: 28 MMOL/L
CREAT SERPL-MCNC: 1.38 MG/DL
CRP SERPL-MCNC: 4 MG/L
EGFR: 57 ML/MIN/1.73M2
EOSINOPHIL # BLD AUTO: 0.43 K/UL
EOSINOPHIL NFR BLD AUTO: 8.8 %
ERYTHROCYTE [SEDIMENTATION RATE] IN BLOOD BY WESTERGREN METHOD: 17 MM/HR
GLUCOSE SERPL-MCNC: 102 MG/DL
HCT VFR BLD CALC: 43 %
HGB BLD-MCNC: 14 G/DL
IMM GRANULOCYTES NFR BLD AUTO: 0.8 %
LYMPHOCYTES # BLD AUTO: 1.51 K/UL
LYMPHOCYTES NFR BLD AUTO: 30.8 %
MAN DIFF?: NORMAL
MCHC RBC-ENTMCNC: 30.8 PG
MCHC RBC-ENTMCNC: 32.6 GM/DL
MCV RBC AUTO: 94.7 FL
MONOCYTES # BLD AUTO: 0.54 K/UL
MONOCYTES NFR BLD AUTO: 11 %
NEUTROPHILS # BLD AUTO: 2.35 K/UL
NEUTROPHILS NFR BLD AUTO: 47.8 %
PLATELET # BLD AUTO: 234 K/UL
POTASSIUM SERPL-SCNC: 4.5 MMOL/L
PROT SERPL-MCNC: 7.2 G/DL
RBC # BLD: 4.54 M/UL
RBC # FLD: 13.3 %
SODIUM SERPL-SCNC: 140 MMOL/L
WBC # FLD AUTO: 4.91 K/UL

## 2023-01-30 ENCOUNTER — NON-APPOINTMENT (OUTPATIENT)
Age: 63
End: 2023-01-30

## 2023-02-18 ENCOUNTER — RX ONLY (RX ONLY)
Age: 63
End: 2023-02-18

## 2023-02-18 ENCOUNTER — OFFICE (OUTPATIENT)
Dept: URBAN - METROPOLITAN AREA CLINIC 103 | Facility: CLINIC | Age: 63
Setting detail: OPHTHALMOLOGY
End: 2023-02-18
Payer: MEDICARE

## 2023-02-18 DIAGNOSIS — H52.7: ICD-10-CM

## 2023-02-18 DIAGNOSIS — H35.033: ICD-10-CM

## 2023-02-18 DIAGNOSIS — H25.13: ICD-10-CM

## 2023-02-18 DIAGNOSIS — G43.109: ICD-10-CM

## 2023-02-18 DIAGNOSIS — H25.013: ICD-10-CM

## 2023-02-18 PROCEDURE — 92250 FUNDUS PHOTOGRAPHY W/I&R: CPT | Performed by: OPHTHALMOLOGY

## 2023-02-18 PROCEDURE — 92015 DETERMINE REFRACTIVE STATE: CPT | Performed by: OPHTHALMOLOGY

## 2023-02-18 PROCEDURE — 99204 OFFICE O/P NEW MOD 45 MIN: CPT | Performed by: OPHTHALMOLOGY

## 2023-02-18 ASSESSMENT — REFRACTION_MANIFEST
OD_SPHERE: +0.75
OD_ADD: +2.00
OS_VA1: 20/20
OD_VA1: 20/20-
OS_SPHERE: +1.50
OD_AXIS: 135
OD_CYLINDER: -0.25
OS_AXIS: 075
OS_ADD: +2.00
OS_CYLINDER: -0.75

## 2023-02-18 ASSESSMENT — REFRACTION_AUTOREFRACTION
OD_CYLINDER: -0.75
OS_SPHERE: +0.75
OD_SPHERE: +1.25
OS_CYLINDER: -0.50
OS_AXIS: 064
OD_AXIS: 113

## 2023-02-18 ASSESSMENT — AXIALLENGTH_DERIVED
OS_AL: 23.175
OD_AL: 23.4412
OD_AL: 23.3454
OS_AL: 22.9424

## 2023-02-18 ASSESSMENT — KERATOMETRY
OS_AXISANGLE_DEGREES: 085
OD_K1POWER_DIOPTERS: 43.00
OS_K2POWER_DIOPTERS: 44.25
OD_AXISANGLE_DEGREES: 055
OS_K1POWER_DIOPTERS: 44.00
OD_K2POWER_DIOPTERS: 43.50

## 2023-02-18 ASSESSMENT — REFRACTION_CURRENTRX
OD_ADD: +2.50
OD_OVR_VA: 20/
OD_VPRISM_DIRECTION: SV
OS_VPRISM_DIRECTION: SV
OS_OVR_VA: 20/
OS_ADD: +2.50

## 2023-02-18 ASSESSMENT — SPHEQUIV_DERIVED
OS_SPHEQUIV: 1.125
OS_SPHEQUIV: 0.5
OD_SPHEQUIV: 0.625
OD_SPHEQUIV: 0.875

## 2023-02-18 ASSESSMENT — CONFRONTATIONAL VISUAL FIELD TEST (CVF)
OS_FINDINGS: FULL
OD_FINDINGS: FULL

## 2023-02-18 ASSESSMENT — TONOMETRY
OD_IOP_MMHG: 16
OS_IOP_MMHG: 15

## 2023-02-18 ASSESSMENT — VISUAL ACUITY
OD_BCVA: 20/40+2
OS_BCVA: 20/20-2

## 2023-03-14 RX ORDER — METHOCARBAMOL 500 MG/1
500 TABLET, FILM COATED ORAL 3 TIMES DAILY
Qty: 90 | Refills: 2 | Status: ACTIVE | COMMUNITY
Start: 2023-03-14 | End: 1900-01-01

## 2023-05-02 ENCOUNTER — APPOINTMENT (OUTPATIENT)
Dept: INTERNAL MEDICINE | Facility: CLINIC | Age: 63
End: 2023-05-02
Payer: MEDICARE

## 2023-05-02 VITALS
SYSTOLIC BLOOD PRESSURE: 152 MMHG | DIASTOLIC BLOOD PRESSURE: 105 MMHG | RESPIRATION RATE: 16 BRPM | WEIGHT: 200 LBS | TEMPERATURE: 98 F | BODY MASS INDEX: 28 KG/M2 | HEART RATE: 59 BPM | HEIGHT: 71 IN | OXYGEN SATURATION: 98 %

## 2023-05-02 DIAGNOSIS — A49.01 METHICILLIN SUSCEPTIBLE STAPHYLOCOCCUS AUREUS INFECTION, UNSPECIFIED SITE: ICD-10-CM

## 2023-05-02 DIAGNOSIS — G06.2 EXTRADURAL AND SUBDURAL ABSCESS, UNSPECIFIED: ICD-10-CM

## 2023-05-02 DIAGNOSIS — M46.20 OSTEOMYELITIS OF VERTEBRA, SITE UNSPECIFIED: ICD-10-CM

## 2023-05-02 PROCEDURE — 36415 COLL VENOUS BLD VENIPUNCTURE: CPT

## 2023-05-02 PROCEDURE — 99214 OFFICE O/P EST MOD 30 MIN: CPT | Mod: 25

## 2023-05-02 RX ORDER — GABAPENTIN 100 MG/1
100 CAPSULE ORAL
Qty: 90 | Refills: 2 | Status: COMPLETED | COMMUNITY
Start: 2022-10-31 | End: 2023-05-02

## 2023-05-02 RX ORDER — GABAPENTIN 100 MG/1
100 CAPSULE ORAL
Qty: 90 | Refills: 0 | Status: COMPLETED | COMMUNITY
Start: 2022-08-05 | End: 2023-05-02

## 2023-05-02 RX ORDER — DOXYCYCLINE 100 MG/1
100 TABLET, FILM COATED ORAL
Qty: 60 | Refills: 2 | Status: ACTIVE | COMMUNITY
Start: 2022-07-07 | End: 1900-01-01

## 2023-05-02 NOTE — HISTORY OF PRESENT ILLNESS
[FreeTextEntry1] : 62-year-old male recently hospitalized at [facility] was admitted July 5 and discharged on 15th after he underwent treatment for MSSA bacteremia complicated by discitis osteomyelitis and epidural abscess at the T7/8 region.  Patient underwent T6-9 laminectomy on June 6.  Patient was on IV antibiotics from the hospital and discharged with IV cefazolin 2 g every 8 hours for 6 weeks which she completed on July 21, 2022.  He was also on rifampin from June 9 to July 7.  He followed up with Dr Anand in the office on July 7, 2022.  He was told to start doxycycline 100 mg twice daily after completion of the IV antibiotics.  Patient started doxycycline on July 22, 2022.  He is here for follow-up.  He reports no complaints.  He followed up with neurosurgery since hospital discharge.  He had a CT scan of the thoracic spine on July 28, 2022 with findings of discitis osteomyelitis at the same region.  He reports no fever no chills.  Denies any night sweats.  No weight loss.

## 2023-05-02 NOTE — ASSESSMENT
[FreeTextEntry1] : 61-year-old male here for follow-up of MSSA bacteremia epidural abscess complicated with osteomyelitis discitis of the thoracic region.  He is status post laminectomy T6-9 June 6, 2022.\par \par MRSA bacteremia\par Epidural of the thoracic spine\par Osteomyelitis/discitis thoracic spine\par Status post laminectomy T6-9 on June 6, 2022\par \par Recommendation:\par MRI thoracic spine from October 4, 2022 reporting resolution of discitis osteomyelitis at T7/8\par We will continue doxycycline 100 mg twice a day\par Labs from prior visit reviewed \par Check CBC, CMP, ESR, CRP and if within normal limits will recommend discontinuing all antibiotics.\par \par \par Follow-up as needed\par \par \par Counseling included lab results, differential diagnosis, treatment options, risks and benefits, lifestyle changes, current condition, medications and dose adjustments.\par The patient was interactive attentive and asked questions and verbalized understanding.\par \par

## 2023-05-04 LAB
ALBUMIN SERPL ELPH-MCNC: 4.8 G/DL
ALP BLD-CCNC: 96 U/L
ALT SERPL-CCNC: 22 U/L
ANION GAP SERPL CALC-SCNC: 12 MMOL/L
AST SERPL-CCNC: 24 U/L
BASOPHILS # BLD AUTO: 0.05 K/UL
BASOPHILS NFR BLD AUTO: 1 %
BILIRUB SERPL-MCNC: 0.6 MG/DL
BUN SERPL-MCNC: 17 MG/DL
CALCIUM SERPL-MCNC: 9.8 MG/DL
CHLORIDE SERPL-SCNC: 103 MMOL/L
CO2 SERPL-SCNC: 26 MMOL/L
CREAT SERPL-MCNC: 1.38 MG/DL
CRP SERPL-MCNC: <3 MG/L
EGFR: 57 ML/MIN/1.73M2
EOSINOPHIL # BLD AUTO: 0.37 K/UL
EOSINOPHIL NFR BLD AUTO: 7.3 %
ERYTHROCYTE [SEDIMENTATION RATE] IN BLOOD BY WESTERGREN METHOD: 14 MM/HR
GLUCOSE SERPL-MCNC: 105 MG/DL
HCT VFR BLD CALC: 42.7 %
HGB BLD-MCNC: 13.7 G/DL
IMM GRANULOCYTES NFR BLD AUTO: 0.8 %
LYMPHOCYTES # BLD AUTO: 1.39 K/UL
LYMPHOCYTES NFR BLD AUTO: 27.5 %
MAN DIFF?: NORMAL
MCHC RBC-ENTMCNC: 30.6 PG
MCHC RBC-ENTMCNC: 32.1 GM/DL
MCV RBC AUTO: 95.3 FL
MONOCYTES # BLD AUTO: 0.54 K/UL
MONOCYTES NFR BLD AUTO: 10.7 %
NEUTROPHILS # BLD AUTO: 2.67 K/UL
NEUTROPHILS NFR BLD AUTO: 52.7 %
PLATELET # BLD AUTO: 230 K/UL
POTASSIUM SERPL-SCNC: 5 MMOL/L
PROT SERPL-MCNC: 7 G/DL
RBC # BLD: 4.48 M/UL
RBC # FLD: 13.8 %
SODIUM SERPL-SCNC: 142 MMOL/L
WBC # FLD AUTO: 5.06 K/UL

## 2023-06-15 ENCOUNTER — APPOINTMENT (OUTPATIENT)
Dept: MRI IMAGING | Facility: CLINIC | Age: 63
End: 2023-06-15
Payer: MEDICARE

## 2023-06-15 PROCEDURE — A9579: CPT

## 2023-06-15 PROCEDURE — A9585: CPT

## 2023-06-15 PROCEDURE — 72157 MRI CHEST SPINE W/O & W/DYE: CPT | Mod: MH

## 2023-06-19 ENCOUNTER — NON-APPOINTMENT (OUTPATIENT)
Age: 63
End: 2023-06-19

## 2023-06-26 ENCOUNTER — APPOINTMENT (OUTPATIENT)
Dept: NEUROSURGERY | Facility: CLINIC | Age: 63
End: 2023-06-26
Payer: MEDICARE

## 2023-06-26 VITALS
DIASTOLIC BLOOD PRESSURE: 97 MMHG | WEIGHT: 200 LBS | SYSTOLIC BLOOD PRESSURE: 153 MMHG | OXYGEN SATURATION: 97 % | HEART RATE: 76 BPM | TEMPERATURE: 98.3 F | HEIGHT: 70.5 IN | BODY MASS INDEX: 28.31 KG/M2

## 2023-06-26 PROCEDURE — 99213 OFFICE O/P EST LOW 20 MIN: CPT

## 2023-07-03 NOTE — DATA REVIEWED
[de-identified] : MRI thoracic spine was not available for review at the time of the visit.  Imaging was reviewed after the patient left the office.  See [employer] for detailed report.

## 2023-07-03 NOTE — PHYSICAL EXAM
[General Appearance - Alert] : alert [General Appearance - In No Acute Distress] : in no acute distress [General Appearance - Well Nourished] : well nourished [General Appearance - Well Developed] : well developed [General Appearance - Well-Appearing] : healthy appearing [Longitudinal] : longitudinal [Oriented To Time, Place, And Person] : oriented to person, place, and time [Person] : oriented to person [Place] : oriented to place [Time] : oriented to time [Short Term Intact] : short term memory intact [Concentration Intact] : normal concentrating ability [Fluency] : fluency intact [Cranial Nerves Optic (II)] : visual acuity intact bilaterally,  pupils equal round and reactive to light [Cranial Nerves Oculomotor (III)] : extraocular motion intact [Motor Tone] : muscle tone was normal in all four extremities [Motor Strength] : muscle strength was normal in all four extremities [Sensation Tactile Decrease] : light touch was intact [Abnormal Walk] : normal gait [FreeTextEntry6] : LE 5/5 bilaterally

## 2023-07-03 NOTE — HISTORY OF PRESENT ILLNESS
[FreeTextEntry1] : Mr. Rene presents for follow-up and review of imaging.  He is doing well from a post-operative perspective.  He denies new or worsening back pain, numbness, tingling, weakness, or gait difficulty.  He denies complaints referable to his incision.

## 2023-08-03 RX ORDER — METHOCARBAMOL 500 MG/1
500 TABLET, FILM COATED ORAL
Qty: 120 | Refills: 0 | Status: ACTIVE | COMMUNITY
Start: 2023-08-03 | End: 1900-01-01

## 2024-12-25 PROBLEM — F10.90 ALCOHOL USE: Status: ACTIVE | Noted: 2022-07-01

## 2025-04-30 ENCOUNTER — APPOINTMENT (OUTPATIENT)
Dept: UROLOGY | Facility: CLINIC | Age: 65
End: 2025-04-30
Payer: MEDICARE

## 2025-04-30 VITALS
HEART RATE: 64 BPM | WEIGHT: 197 LBS | DIASTOLIC BLOOD PRESSURE: 103 MMHG | TEMPERATURE: 97.3 F | HEIGHT: 71 IN | BODY MASS INDEX: 27.58 KG/M2 | SYSTOLIC BLOOD PRESSURE: 163 MMHG

## 2025-04-30 VITALS — HEART RATE: 69 BPM | SYSTOLIC BLOOD PRESSURE: 156 MMHG | DIASTOLIC BLOOD PRESSURE: 97 MMHG

## 2025-04-30 DIAGNOSIS — R97.20 ELEVATED PROSTATE, SPECIFIC ANTIGEN [PSA]: ICD-10-CM

## 2025-04-30 DIAGNOSIS — R68.89 OTHER GENERAL SYMPTOMS AND SIGNS: ICD-10-CM

## 2025-04-30 PROCEDURE — 99204 OFFICE O/P NEW MOD 45 MIN: CPT

## 2025-05-13 ENCOUNTER — RESULT REVIEW (OUTPATIENT)
Age: 65
End: 2025-05-13

## 2025-05-13 ENCOUNTER — APPOINTMENT (OUTPATIENT)
Dept: MRI IMAGING | Facility: CLINIC | Age: 65
End: 2025-05-13

## 2025-05-13 PROCEDURE — 72197 MRI PELVIS W/O & W/DYE: CPT

## 2025-05-13 PROCEDURE — A9585: CPT | Mod: JW

## 2025-05-13 PROCEDURE — 76498P: CUSTOM

## 2025-05-21 ENCOUNTER — APPOINTMENT (OUTPATIENT)
Dept: UROLOGY | Facility: CLINIC | Age: 65
End: 2025-05-21
Payer: MEDICARE

## 2025-05-21 VITALS
WEIGHT: 197 LBS | BODY MASS INDEX: 27.58 KG/M2 | DIASTOLIC BLOOD PRESSURE: 92 MMHG | SYSTOLIC BLOOD PRESSURE: 150 MMHG | TEMPERATURE: 97.3 F | HEART RATE: 65 BPM | HEIGHT: 71 IN

## 2025-05-21 DIAGNOSIS — R68.89 OTHER GENERAL SYMPTOMS AND SIGNS: ICD-10-CM

## 2025-05-21 DIAGNOSIS — R93.5 ABNORMAL FINDINGS ON DIAGNOSTIC IMAGING OF OTHER ABDOMINAL REGIONS, INCLUDING RETROPERITONEUM: ICD-10-CM

## 2025-05-21 PROCEDURE — 99214 OFFICE O/P EST MOD 30 MIN: CPT

## 2025-05-21 PROCEDURE — G2211 COMPLEX E/M VISIT ADD ON: CPT

## 2025-05-21 RX ORDER — (SALINE) 19; 7 G/133ML; G/133ML
ENEMA RECTAL
Qty: 1 | Refills: 0 | Status: ACTIVE | COMMUNITY
Start: 2025-05-21 | End: 1900-01-01

## 2025-05-21 RX ORDER — DIAZEPAM 10 MG/1
10 TABLET ORAL
Qty: 1 | Refills: 0 | Status: ACTIVE | COMMUNITY
Start: 2025-05-21 | End: 1900-01-01

## (undated) DEVICE — DRAPE INSTRUMENT POUCH

## (undated) DEVICE — PACK NEURO SO SIDE

## (undated) DEVICE — DISSECTING TOOL MIDAS REX 10CM 2MM

## (undated) DEVICE — DRAIN JACKSON PRATT 7MM FLAT FULL W 15 FR TROCAR

## (undated) DEVICE — POSITIONER OA ARM ELEVATOR DISP

## (undated) DEVICE — GLV 7 PROTEXIS

## (undated) DEVICE — DRSG TELFA 3 X 4

## (undated) DEVICE — POSITIONER FOAM EGG CRATE ULNAR (2PCS)

## (undated) DEVICE — DRAPE SPLIT SHEETS 77X108"

## (undated) DEVICE — SUT VICRYL 2-0 18" CP-2 UNDYED

## (undated) DEVICE — DRAPE TOWEL BLUE 17" X 24"

## (undated) DEVICE — DRILL BIT MEDTRONIC 5.0MM PLATINIUM

## (undated) DEVICE — SUT MONOCRYL 4-0 27" PS-2 UNDYED

## (undated) DEVICE — ELCTR EDGE BOVIE INSULATED NEEDLE TIP 6.5"

## (undated) DEVICE — GLV 7.5 PROTEXIS

## (undated) DEVICE — BLANKET WARMER UPPER ADULT

## (undated) DEVICE — WRAP COMPRESSION CALF MED

## (undated) DEVICE — SUT VICRYL 0 18" CT-1 UNDYED

## (undated) DEVICE — FOLEY TRAY 16FR LF URINE METER SURESTEP

## (undated) DEVICE — DRAPE C ARM UNIVERSAL